# Patient Record
Sex: FEMALE | Race: WHITE | Employment: UNEMPLOYED | URBAN - METROPOLITAN AREA
[De-identification: names, ages, dates, MRNs, and addresses within clinical notes are randomized per-mention and may not be internally consistent; named-entity substitution may affect disease eponyms.]

---

## 2018-05-08 ENCOUNTER — OFFICE VISIT (OUTPATIENT)
Dept: FAMILY MEDICINE CLINIC | Facility: CLINIC | Age: 48
End: 2018-05-08

## 2018-05-08 VITALS
SYSTOLIC BLOOD PRESSURE: 130 MMHG | TEMPERATURE: 98 F | HEIGHT: 69 IN | DIASTOLIC BLOOD PRESSURE: 90 MMHG | HEART RATE: 80 BPM | RESPIRATION RATE: 16 BRPM | WEIGHT: 225 LBS | BODY MASS INDEX: 33.33 KG/M2

## 2018-05-08 DIAGNOSIS — M47.816 OSTEOARTHRITIS OF LUMBAR SPINE, UNSPECIFIED SPINAL OSTEOARTHRITIS COMPLICATION STATUS: ICD-10-CM

## 2018-05-08 DIAGNOSIS — M54.2 NECK PAIN: Primary | ICD-10-CM
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
## 2018-05-08 PROCEDURE — 99203 OFFICE O/P NEW LOW 30 MIN: CPT | Performed by: FAMILY MEDICINE

## 2018-05-08 RX ORDER — IBUPROFEN 200 MG
600 TABLET ORAL 2 TIMES DAILY
Status: ON HOLD | COMMUNITY
End: 2020-05-27

## 2018-05-08 RX ORDER — MULTIVITAMIN
1 TABLET ORAL DAILY
COMMUNITY
End: 2020-12-22

## 2018-05-08 RX ORDER — HYDROCODONE BITARTRATE AND ACETAMINOPHEN 10; 325 MG/1; MG/1
1-2 TABLET ORAL EVERY 4 HOURS PRN
Qty: 90 TABLET | Refills: 0 | Status: SHIPPED | OUTPATIENT
Start: 2018-05-08 | End: 2018-05-29

## 2018-05-08 RX ORDER — CYCLOBENZAPRINE HCL 10 MG
5 TABLET ORAL NIGHTLY PRN
COMMUNITY
Start: 2018-04-27 | End: 2019-09-13

## 2018-05-08 RX ORDER — EPINEPHRINE 0.3 MG/.3ML
0.3 INJECTION SUBCUTANEOUS ONCE
COMMUNITY

## 2018-05-08 RX ORDER — HYDROCODONE BITARTRATE AND ACETAMINOPHEN 10; 325 MG/1; MG/1
1-2 TABLET ORAL EVERY 4 HOURS PRN
COMMUNITY
End: 2018-05-08

## 2018-05-08 RX ORDER — CAFFEINE 200 MG
200 TABLET ORAL DAILY
COMMUNITY

## 2018-05-08 NOTE — H&P
[address] [place] [address]    History and Physical    [facility] Patient Status:  No patient class for patient encounter    1970 MRN [id number]   Location [address] [place] [address], [address] [facility] , [facility] Attending No att. Mother      copd     Social History:  Smoking status: Never Smoker                                                              Smokeless tobacco: Never Used                      Alcohol use:  Yes              Comment: ocassionally     Allergies/Medications person, place, and time. No cranial nerve deficit, sensory deficit or motor deficit. Skin: Skin is warm. Psychiatric: She has a normal mood and affect.  Her behavior is normal. Thought content normal.         Results:   No results found for: WBC, HGB, H

## 2018-05-08 NOTE — PATIENT INSTRUCTIONS
Thank you for choosing Gale Fisher MD at [place] [address]  To Do: Maria Del Carmen Johnson  1. Please take meds as directed  [employer] is located in [address]. Monday, Tuesday & Friday – 8 a.m. to 4 p.m. Wednesday, Thursday – 7 a.m. to 3 p.m. those potential risks and we strive to make you healthier and to improve your quality of life.     Referrals, and Radiology Information:    If your insurance requires a referral to a specialist, please allow 5 business days to process your referral request.

## 2018-05-29 ENCOUNTER — OFFICE VISIT (OUTPATIENT)
Dept: FAMILY MEDICINE CLINIC | Facility: CLINIC | Age: 48
End: 2018-05-29

## 2018-05-29 VITALS
TEMPERATURE: 98 F | WEIGHT: 216 LBS | HEART RATE: 106 BPM | SYSTOLIC BLOOD PRESSURE: 130 MMHG | RESPIRATION RATE: 16 BRPM | DIASTOLIC BLOOD PRESSURE: 76 MMHG | HEIGHT: 69 IN | BODY MASS INDEX: 31.99 KG/M2

## 2018-05-29 DIAGNOSIS — R45.86 MOOD DISTURBANCE: ICD-10-CM

## 2018-05-29 DIAGNOSIS — G89.29 OTHER CHRONIC PAIN: ICD-10-CM

## 2018-05-29 DIAGNOSIS — M47.816 OSTEOARTHRITIS OF LUMBAR SPINE, UNSPECIFIED SPINAL OSTEOARTHRITIS COMPLICATION STATUS: Primary | ICD-10-CM

## 2018-05-29 PROCEDURE — 99214 OFFICE O/P EST MOD 30 MIN: CPT | Performed by: FAMILY MEDICINE

## 2018-05-29 RX ORDER — HYDROCODONE BITARTRATE AND ACETAMINOPHEN 10; 325 MG/1; MG/1
1-2 TABLET ORAL EVERY 8 HOURS PRN
Qty: 90 TABLET | Refills: 0 | Status: SHIPPED | OUTPATIENT
Start: 2018-05-29 | End: 2019-03-27

## 2018-05-29 NOTE — PROGRESS NOTES
HPI:    Patient ID: Elana Pina is a 56year old female. HPI  Ms. Lisa York is a 81-year-old female with history of chronic pain secondary to lumbar degenerative joint disease and osteoarthritis of the lumbar spine.   Recently she was involved in a caffeine 200 MG Oral Tab Take 200 mg by mouth every 4 (four) hours as needed. Disp:  Rfl:    EPINEPHrine (EPIPEN 2-HERRERA) 0.3 MG/0.3ML Injection Solution Auto-injector Inject 0.3 mL as directed one time.  Disp:  Rfl:      Allergies:  Latex where she lives as all of these events had happened in that vicinity.   3.  Medication monitoring–she had written a pain contract today    I shall try and review what records we have available I certainly advise her to get a  as Even for me, this is g

## 2018-05-29 NOTE — PATIENT INSTRUCTIONS
Thank you for choosing Elaina Richmond MD at [place] [address]  To Do: Cornelius Flores  1. Please take meds as directed  [employer] is located in [address] Monday, Tuesday & Friday – 8 a.m. to 4 p.m. Wednesday, Thursday – 7 a.m. to 3 p.m. those potential risks and we strive to make you healthier and to improve your quality of life.     Referrals, and Radiology Information:    If your insurance requires a referral to a specialist, please allow 5 business days to process your referral request.

## 2018-10-18 ENCOUNTER — TELEPHONE (OUTPATIENT)
Dept: FAMILY MEDICINE CLINIC | Facility: CLINIC | Age: 48
End: 2018-10-18

## 2019-03-12 ENCOUNTER — TELEPHONE (OUTPATIENT)
Dept: FAMILY MEDICINE CLINIC | Facility: CLINIC | Age: 49
End: 2019-03-12

## 2019-03-27 ENCOUNTER — OFFICE VISIT (OUTPATIENT)
Dept: FAMILY MEDICINE CLINIC | Facility: CLINIC | Age: 49
End: 2019-03-27
Payer: COMMERCIAL

## 2019-03-27 ENCOUNTER — TELEPHONE (OUTPATIENT)
Dept: FAMILY MEDICINE CLINIC | Facility: CLINIC | Age: 49
End: 2019-03-27

## 2019-03-27 VITALS
RESPIRATION RATE: 16 BRPM | DIASTOLIC BLOOD PRESSURE: 80 MMHG | SYSTOLIC BLOOD PRESSURE: 120 MMHG | HEART RATE: 80 BPM | BODY MASS INDEX: 30.51 KG/M2 | TEMPERATURE: 98 F | HEIGHT: 69 IN | WEIGHT: 206 LBS

## 2019-03-27 DIAGNOSIS — G89.29 OTHER CHRONIC PAIN: ICD-10-CM

## 2019-03-27 DIAGNOSIS — M47.816 OSTEOARTHRITIS OF LUMBAR SPINE, UNSPECIFIED SPINAL OSTEOARTHRITIS COMPLICATION STATUS: Primary | ICD-10-CM

## 2019-03-27 DIAGNOSIS — M25.50 ARTHRALGIA, UNSPECIFIED JOINT: ICD-10-CM

## 2019-03-27 PROCEDURE — 99214 OFFICE O/P EST MOD 30 MIN: CPT | Performed by: FAMILY MEDICINE

## 2019-03-27 RX ORDER — CELECOXIB 100 MG/1
100 CAPSULE ORAL 2 TIMES DAILY
COMMUNITY
Start: 2019-01-09 | End: 2019-06-11

## 2019-03-27 RX ORDER — TRAMADOL HYDROCHLORIDE 50 MG/1
50 TABLET ORAL EVERY 8 HOURS PRN
COMMUNITY
Start: 2017-06-28 | End: 2019-03-27

## 2019-03-27 RX ORDER — TRAZODONE HYDROCHLORIDE 50 MG/1
1 TABLET ORAL NIGHTLY PRN
COMMUNITY
Start: 2019-01-24 | End: 2019-09-13

## 2019-03-27 RX ORDER — TRAMADOL HYDROCHLORIDE 50 MG/1
50 TABLET ORAL EVERY 8 HOURS PRN
Qty: 90 TABLET | Refills: 2 | Status: SHIPPED | OUTPATIENT
Start: 2019-03-27 | End: 2019-06-11

## 2019-03-27 NOTE — PATIENT INSTRUCTIONS
Thank you for choosing Cyn Damico MD at [place] [address]  To Do: Estella Hunt  1. Please take meds as directed. [facility] is located in [address]. Monday, Tuesday & Friday – 8 a.m. to 4 p.m.   Wednesday, Thursday – 7 a.m. to 3 p outweigh those potential risks and we strive to make you healthier and to improve your quality of life.     Referrals, and Radiology Information:    If your insurance requires a referral to a specialist, please allow 5 business days to process your referral

## 2019-03-27 NOTE — PROGRESS NOTES
HPI:    Patient ID: Declan Haywood is a 53year old female. HPI  Ms. Dejon Oscar is a pleasant 52-year-old female well known to me from my previous practice.   She has a history of lumbar degenerative joint disease and osteoarthritis of the lumbar spine Genitourinary: Negative for difficulty urinating. Neurological: Negative for dizziness. Current Outpatient Medications:  celecoxib 100 MG Oral Cap Take 100 mg by mouth 2 (two) times daily.  Disp:  Rfl:    traMADol HCl 50 MG Oral Tab Take 1 ta has a normal mood and affect. Her behavior is normal. Judgment and thought content normal.   Vitals reviewed.              ASSESSMENT/PLAN:   Osteoarthritis of lumbar spine, unspecified spinal osteoarthritis complication status  (primary encounter diagnosis

## 2019-03-27 NOTE — TELEPHONE ENCOUNTER
I faxed a medical records request to Dr Claudia Gong at [facility] fax# [phone] and ph# [phone] - fax aubreermtn rec'd - copy to scan

## 2019-04-24 ENCOUNTER — TELEPHONE (OUTPATIENT)
Dept: FAMILY MEDICINE CLINIC | Facility: CLINIC | Age: 49
End: 2019-04-24

## 2019-04-24 NOTE — TELEPHONE ENCOUNTER
PA has been submitted through [facility] for tramadol and was approved through insurance.    As long as you remain covered by your prescription drug plan and there are no  changes to your plan benefits, this request is approved for the following time period:  03/2

## 2019-06-11 ENCOUNTER — OFFICE VISIT (OUTPATIENT)
Dept: FAMILY MEDICINE CLINIC | Facility: CLINIC | Age: 49
End: 2019-06-11
Payer: COMMERCIAL

## 2019-06-11 VITALS
RESPIRATION RATE: 16 BRPM | BODY MASS INDEX: 32.29 KG/M2 | HEIGHT: 69 IN | WEIGHT: 218 LBS | SYSTOLIC BLOOD PRESSURE: 120 MMHG | DIASTOLIC BLOOD PRESSURE: 80 MMHG | HEART RATE: 80 BPM | TEMPERATURE: 98 F

## 2019-06-11 DIAGNOSIS — M47.816 OSTEOARTHRITIS OF LUMBAR SPINE, UNSPECIFIED SPINAL OSTEOARTHRITIS COMPLICATION STATUS: Primary | ICD-10-CM

## 2019-06-11 PROCEDURE — 99213 OFFICE O/P EST LOW 20 MIN: CPT | Performed by: FAMILY MEDICINE

## 2019-06-11 RX ORDER — TRAMADOL HYDROCHLORIDE 50 MG/1
50 TABLET ORAL EVERY 6 HOURS PRN
Qty: 120 TABLET | Refills: 2 | Status: SHIPPED | OUTPATIENT
Start: 2019-06-11 | End: 2019-09-02

## 2019-06-11 NOTE — PROGRESS NOTES
HPI:    Patient ID: Marvin Dhillon is a 53year old female. HPI  Ms. Alexis Nickerson is a pleasant 81-year-old female well-known to me.   She has history of chronic lumbar pain secondary to osteoarthritis of the lumbar spine which has been documented by MRI COMMENTS)    Comment:Pt  said she has an adverse reaction to             Ativan last time she had it, no sure of reaction  Penicillin G            RASH   PHYSICAL EXAM:   Physical Exam   HENT:   Mouth/Throat: Oropharynx is clear and moist. No oropha

## 2019-06-11 NOTE — PATIENT INSTRUCTIONS
Thank you for choosing Naga Hawkins MD at [place] [address]  To Do: Lori Ferrari  1. Please take meds as directed. [facility] is located in [address]. Monday, Tuesday & Friday – 8 a.m. to 4 p.m.   Wednesday, Thursday – 7 a.m. to 3 p outweigh those potential risks and we strive to make you healthier and to improve your quality of life.     Referrals, and Radiology Information:    If your insurance requires a referral to a specialist, please allow 5 business days to process your referral

## 2019-09-03 RX ORDER — TRAMADOL HYDROCHLORIDE 50 MG/1
TABLET ORAL
Qty: 120 TABLET | Refills: 2 | Status: SHIPPED | OUTPATIENT
Start: 2019-09-03 | End: 2019-11-26

## 2019-09-03 NOTE — TELEPHONE ENCOUNTER
Faxed Rx for Tramadol 50mg approved by Dr. Elma Dangelo 9/3/19 to [facility] [phone]. Fax confirmation received.

## 2019-09-03 NOTE — TELEPHONE ENCOUNTER
Requesting TRAMADOL HCL 50 MG   LOV: 6/11/19  RTC: 3 months  Last Relevant Labs: n/a  Filled: 6/11/19 # 120 with 2 refills    Future Appointments   Date Time Provider Miguel Nguyen   9/11/2019 11:00 AM Ray Lombard, MD [facility] [address]      Dispe

## 2019-09-13 RX ORDER — TRAZODONE HYDROCHLORIDE 50 MG/1
50 TABLET ORAL NIGHTLY PRN
Qty: 30 TABLET | Refills: 0 | Status: SHIPPED | OUTPATIENT
Start: 2019-09-13 | End: 2019-11-15

## 2019-09-13 RX ORDER — CYCLOBENZAPRINE HCL 10 MG
5 TABLET ORAL NIGHTLY PRN
Qty: 30 TABLET | Refills: 0 | Status: SHIPPED | OUTPATIENT
Start: 2019-09-13 | End: 2019-09-24

## 2019-09-13 NOTE — TELEPHONE ENCOUNTER
[facility]. Not filled at this pharmacy. Pharmacist thinks pt was using [facility]. Called pharmacy, pt was not using pharmacy recently, last RX 7/2019 Flexeril,  Last fill 4/10/19 #60 Trazodone. Both prescribed by another PCP.      I

## 2019-09-13 NOTE — TELEPHONE ENCOUNTER
Flexeril & Trazodone    Pt state the pharmacy sent requests yesterday and that she is completely out of the above medications.       [facility], [address], [phone]

## 2019-09-19 ENCOUNTER — TELEPHONE (OUTPATIENT)
Dept: FAMILY MEDICINE CLINIC | Facility: CLINIC | Age: 49
End: 2019-09-19

## 2019-09-19 NOTE — TELEPHONE ENCOUNTER
[address], [facility]note 2] [address]  P [phone]  F [phone]  Order faxed, confirmation received

## 2019-09-19 NOTE — TELEPHONE ENCOUNTER
Pt needs a referral sent to [address] [place] [address] at [phone],LEONIE advise

## 2019-09-24 ENCOUNTER — OFFICE VISIT (OUTPATIENT)
Dept: FAMILY MEDICINE CLINIC | Facility: CLINIC | Age: 49
End: 2019-09-24
Payer: COMMERCIAL

## 2019-09-24 ENCOUNTER — LAB ENCOUNTER (OUTPATIENT)
Dept: LAB | Age: 49
End: 2019-09-24
Attending: FAMILY MEDICINE
Payer: COMMERCIAL

## 2019-09-24 VITALS
BODY MASS INDEX: 34.21 KG/M2 | SYSTOLIC BLOOD PRESSURE: 120 MMHG | RESPIRATION RATE: 16 BRPM | HEIGHT: 69 IN | WEIGHT: 231 LBS | TEMPERATURE: 98 F | HEART RATE: 78 BPM | DIASTOLIC BLOOD PRESSURE: 80 MMHG

## 2019-09-24 DIAGNOSIS — Z12.31 ENCOUNTER FOR SCREENING MAMMOGRAM FOR BREAST CANCER: ICD-10-CM

## 2019-09-24 DIAGNOSIS — Z00.00 LABORATORY EXAM ORDERED AS PART OF ROUTINE GENERAL MEDICAL EXAMINATION: ICD-10-CM

## 2019-09-24 DIAGNOSIS — T78.40XD ALLERGIC STATE, SUBSEQUENT ENCOUNTER: ICD-10-CM

## 2019-09-24 DIAGNOSIS — Z00.00 WELLNESS EXAMINATION: Primary | ICD-10-CM

## 2019-09-24 PROBLEM — T78.40XA ALLERGY: Status: ACTIVE | Noted: 2019-09-24

## 2019-09-24 PROBLEM — T78.3XXA ANGIOEDEMA: Status: ACTIVE | Noted: 2019-09-24

## 2019-09-24 LAB
ALBUMIN SERPL-MCNC: 3.5 G/DL (ref 3.4–5)
ALBUMIN/GLOB SERPL: 1 {RATIO} (ref 1–2)
ALP LIVER SERPL-CCNC: 79 U/L (ref 39–100)
ALT SERPL-CCNC: 20 U/L (ref 13–56)
ANION GAP SERPL CALC-SCNC: 5 MMOL/L (ref 0–18)
AST SERPL-CCNC: 22 U/L (ref 15–37)
BASOPHILS # BLD AUTO: 0.04 X10(3) UL (ref 0–0.2)
BASOPHILS NFR BLD AUTO: 0.6 %
BILIRUB SERPL-MCNC: 0.2 MG/DL (ref 0.1–2)
BUN BLD-MCNC: 16 MG/DL (ref 7–18)
BUN/CREAT SERPL: 21.1 (ref 10–20)
CALCIUM BLD-MCNC: 8.5 MG/DL (ref 8.5–10.1)
CHLORIDE SERPL-SCNC: 104 MMOL/L (ref 98–112)
CHOLEST SMN-MCNC: 194 MG/DL (ref ?–200)
CO2 SERPL-SCNC: 26 MMOL/L (ref 21–32)
CREAT BLD-MCNC: 0.76 MG/DL (ref 0.55–1.02)
DEPRECATED RDW RBC AUTO: 40.4 FL (ref 35.1–46.3)
EOSINOPHIL # BLD AUTO: 0.3 X10(3) UL (ref 0–0.7)
EOSINOPHIL NFR BLD AUTO: 4.6 %
ERYTHROCYTE [DISTWIDTH] IN BLOOD BY AUTOMATED COUNT: 12.2 % (ref 11–15)
GLOBULIN PLAS-MCNC: 3.5 G/DL (ref 2.8–4.4)
GLUCOSE BLD-MCNC: 83 MG/DL (ref 70–99)
HCT VFR BLD AUTO: 40.7 % (ref 35–48)
HDLC SERPL-MCNC: 60 MG/DL (ref 40–59)
HGB BLD-MCNC: 13.1 G/DL (ref 12–16)
IMM GRANULOCYTES # BLD AUTO: 0.04 X10(3) UL (ref 0–1)
IMM GRANULOCYTES NFR BLD: 0.6 %
LDLC SERPL CALC-MCNC: 103 MG/DL (ref ?–100)
LYMPHOCYTES # BLD AUTO: 2.15 X10(3) UL (ref 1–4)
LYMPHOCYTES NFR BLD AUTO: 32.7 %
M PROTEIN MFR SERPL ELPH: 7 G/DL (ref 6.4–8.2)
MCH RBC QN AUTO: 29.1 PG (ref 26–34)
MCHC RBC AUTO-ENTMCNC: 32.2 G/DL (ref 31–37)
MCV RBC AUTO: 90.4 FL (ref 80–100)
MONOCYTES # BLD AUTO: 0.45 X10(3) UL (ref 0.1–1)
MONOCYTES NFR BLD AUTO: 6.8 %
NEUTROPHILS # BLD AUTO: 3.6 X10 (3) UL (ref 1.5–7.7)
NEUTROPHILS # BLD AUTO: 3.6 X10(3) UL (ref 1.5–7.7)
NEUTROPHILS NFR BLD AUTO: 54.7 %
NONHDLC SERPL-MCNC: 134 MG/DL (ref ?–130)
OSMOLALITY SERPL CALC.SUM OF ELEC: 280 MOSM/KG (ref 275–295)
PLATELET # BLD AUTO: 300 10(3)UL (ref 150–450)
POTASSIUM SERPL-SCNC: 4.1 MMOL/L (ref 3.5–5.1)
RBC # BLD AUTO: 4.5 X10(6)UL (ref 3.8–5.3)
SODIUM SERPL-SCNC: 135 MMOL/L (ref 136–145)
TRIGL SERPL-MCNC: 156 MG/DL (ref 30–149)
TSI SER-ACNC: 1.94 MIU/ML (ref 0.36–3.74)
VLDLC SERPL CALC-MCNC: 31 MG/DL (ref 0–30)
WBC # BLD AUTO: 6.6 X10(3) UL (ref 4–11)

## 2019-09-24 PROCEDURE — 99396 PREV VISIT EST AGE 40-64: CPT | Performed by: FAMILY MEDICINE

## 2019-09-24 PROCEDURE — 99213 OFFICE O/P EST LOW 20 MIN: CPT | Performed by: FAMILY MEDICINE

## 2019-09-24 PROCEDURE — 84443 ASSAY THYROID STIM HORMONE: CPT

## 2019-09-24 RX ORDER — CYCLOBENZAPRINE HCL 10 MG
10 TABLET ORAL NIGHTLY PRN
Qty: 30 TABLET | Refills: 5 | Status: SHIPPED | OUTPATIENT
Start: 2019-09-24 | End: 2020-04-06

## 2019-09-24 RX ORDER — CETIRIZINE HYDROCHLORIDE 10 MG/1
TABLET ORAL DAILY
COMMUNITY

## 2019-09-24 RX ORDER — METHYLPREDNISOLONE 4 MG/1
TABLET ORAL
Qty: 1 KIT | Refills: 0 | Status: SHIPPED | OUTPATIENT
Start: 2019-09-24 | End: 2020-01-09 | Stop reason: ALTCHOICE

## 2019-09-24 NOTE — PROGRESS NOTES
Wellness Exam    REASON FOR VISIT:    Gary Gaston is a 51year old female who presents for an [address]     Current Complaints: Ms. Jessica Reynolds is a pleasant 50 y/o F here for her wellness exam  Flu shot: see immunization record  Health Opener : No    Scoring  Total Score: 0       PHQ-4: Over the LAST 2 WEEKS       Depression Screening (PHQ-2/PHQ-9): Over the LAST 2 WEEKS   Little interest or pleasure in doing things (over the last two weeks)?: Not at all    Feeling down, depressed, or ho Latex                   ANAPHYLAXIS  Oxycodone               SWELLING    Comment:Leg swelling  Phentermine             HIVES, SWELLING  Lorazepam               OTHER (SEE COMMENTS)    Comment:Pt  said she has an adverse reaction to             A pain and visual disturbance. Respiratory: Negative for cough and shortness of breath. Cardiovascular: Negative for chest pain and palpitations. Gastrointestinal: Negative for nausea, vomiting, abdominal pain and diarrhea.    Genitourinary: Negative f for an Annual Health Assessment.    Wellness examination  (primary encounter diagnosis)  Encounter for screening mammogram for breast cancer  Laboratory exam ordered as part of routine general medical examination  Allergic state, subsequent encounter    AC Medical education done. Outcome: Patient verbalizes understanding. Educated by: MD     The patient indicates understanding of these issues and agrees to the plan.     SUGGESTED VACCINATIONS - Influenza, Pneumococcal, Zoster, Tetanus     Immunization His HIVES, SWELLING  Lorazepam               OTHER (SEE COMMENTS)    Comment:Pt  said she has an adverse reaction to             Ativan last time she had it, no sure of reaction  Penicillin G            RASH   PHYSICAL EXAM:   Physical Exam           AS

## 2019-09-24 NOTE — PATIENT INSTRUCTIONS
Thank you for choosing Munir Shah MD at [place] [address]  To Do: Chilean Phenes  1. Please see age appropriate health prevention below    [facility] is located in [address]. Monday, Tuesday & Friday – 8 a.m. to 4 p.m.   Wednesday, [facility] that the benefits outweigh those potential risks and we strive to make you healthier and to improve your quality of life.     Referrals, and Radiology Information:    If your insurance requires a referral to a specialist, please allow 5 business days to pro any age who are overweight or obese and have 1 or more additional risk factors for diabetes At least every 3 years1   Type 2 diabetes or prediabetes All women diagnosed with gestational diabetes Lifelong testing every 3 years   Type 2 diabetes All women wi in this age group At routine exams   Gonorrhea Sexually active women at increased risk for infection At routine exams   Hepatitis C Anyone at increased risk; 1 time for those born between [facility] At routine exams   High cholesterol or triglycerides A doses   Pneumococcal conjugate vaccine (PCV13) and pneumococcal polysaccharide vaccine (PPSV23) Women at increased risk for infection–talk with your healthcare provider 1 or 2 doses   Tetanus/diphtheria/pertussis (Td/Tdap) booster All women in this age [de-identified]

## 2019-10-22 ENCOUNTER — TELEPHONE (OUTPATIENT)
Dept: FAMILY MEDICINE CLINIC | Facility: CLINIC | Age: 49
End: 2019-10-22

## 2019-10-22 ENCOUNTER — OFFICE VISIT (OUTPATIENT)
Dept: FAMILY MEDICINE CLINIC | Facility: CLINIC | Age: 49
End: 2019-10-22
Payer: COMMERCIAL

## 2019-10-22 VITALS
HEART RATE: 88 BPM | BODY MASS INDEX: 34.66 KG/M2 | HEIGHT: 69 IN | TEMPERATURE: 98 F | DIASTOLIC BLOOD PRESSURE: 88 MMHG | RESPIRATION RATE: 16 BRPM | WEIGHT: 234 LBS | SYSTOLIC BLOOD PRESSURE: 132 MMHG

## 2019-10-22 DIAGNOSIS — M54.50 ACUTE MIDLINE LOW BACK PAIN WITHOUT SCIATICA: ICD-10-CM

## 2019-10-22 DIAGNOSIS — M25.521 RIGHT ELBOW PAIN: Primary | ICD-10-CM

## 2019-10-22 PROCEDURE — 99214 OFFICE O/P EST MOD 30 MIN: CPT | Performed by: FAMILY MEDICINE

## 2019-10-22 RX ORDER — HYDROCODONE BITARTRATE AND ACETAMINOPHEN 10; 325 MG/1; MG/1
1-2 TABLET ORAL EVERY 8 HOURS PRN
Qty: 90 TABLET | Refills: 0 | Status: SHIPPED | OUTPATIENT
Start: 2019-10-22 | End: 2020-01-09

## 2019-10-22 NOTE — PROGRESS NOTES
HPI:    Patient ID: Prasanna Griffiths is a 51year old female. HPI  Ms. Dante Muro is a pleasant 56-year-old female with history of chronic pain secondary to degenerative changes on her lumbar spine but was in her home last Thursday last week when she wa Take 1 tablet (50 mg total) by mouth nightly as needed. , Disp: 30 tablet, Rfl: 0  TRAMADOL HCL 50 MG Oral Tab, TAKE ONE TABLET BY MOUTH EVERY 6 HOURS AS NEEDED, Disp: 120 tablet, Rfl: 2  Multiple Vitamin (MULTI-VITAMIN DAILY) Oral Tab, Take 1 tablet by stewart wait for that and await results and asked her to follow-up as scheduled or as needed  No orders of the defined types were placed in this encounter.       Meds This Visit:  Requested Prescriptions     Signed Prescriptions Disp Refills   • HYDROcodone-acetami

## 2019-10-22 NOTE — PATIENT INSTRUCTIONS
Thank you for choosing Ching Lama MD at [place] [address]  To Do: Jenn Cesar  1. Please take meds as directed. [facility] is located in [address]. Monday, Tuesday & Friday – 8 a.m. to 4 p.m.   Wednesday, Thursday – 7 a.m. to 3 p outweigh those potential risks and we strive to make you healthier and to improve your quality of life.     Referrals, and Radiology Information:    If your insurance requires a referral to a specialist, please allow 5 business days to process your referral

## 2019-10-22 NOTE — TELEPHONE ENCOUNTER
[address] states they need a Diagnosis code for   HYDROcodone-acetaminophen  MG Oral Tab 90 tablet 0 10/22/2019    Sig:   Take 1-2 tablets by mouth every 8 (eight) hours as needed for Pain.  This should last for at least 30 days     Route:

## 2019-10-28 ENCOUNTER — TELEPHONE (OUTPATIENT)
Dept: FAMILY MEDICINE CLINIC | Facility: CLINIC | Age: 49
End: 2019-10-28

## 2019-10-28 NOTE — TELEPHONE ENCOUNTER
Right Elbow Xray report dated 10-24-19 from [address] [place] [address] received. Reviewed per Dr. Nick Armstrong: Normal.    LMOM for pt requesting a return call. Sent to scan, copy in Triage file.

## 2019-10-28 NOTE — TELEPHONE ENCOUNTER
Pt returned call, informed of normal results. Pt expressed understanding.  Pt still states the elbow is bruised with swelling, inquired if she wanted an Ortho recommendation and pt states she will monitor symptoms closely and go if they persist. Task comple

## 2019-11-01 ENCOUNTER — TELEPHONE (OUTPATIENT)
Dept: FAMILY MEDICINE CLINIC | Facility: CLINIC | Age: 49
End: 2019-11-01

## 2019-11-01 NOTE — TELEPHONE ENCOUNTER
Spoke to the pharmacists and provided information below.    OV note 10/22/19  ASSESSMENT/PLAN:   Right elbow pain  (primary encounter diagnosis)  -Elevate this extremity at rest; may continue current pain regimen but will change tramadol to Norco for now; w

## 2019-11-01 NOTE — TELEPHONE ENCOUNTER
Pharmacist states the patient filled Colette Rias on 10/22 and now calling to fill Tramadol. They want to confirm that the patient is to be taking both, please advise.

## 2019-11-01 NOTE — TELEPHONE ENCOUNTER
Patient states that Dr Fitz Rios advised her to take the Derotha Salaam and when she didn't need them to go back to Tramadol.  Patient received 90 day of [place]. States she now needs a refill on her Tramadol     Tramadol 50 mg 9/3/19 #120 with 2 refills     Please adv

## 2019-11-06 ENCOUNTER — TELEPHONE (OUTPATIENT)
Dept: FAMILY MEDICINE CLINIC | Facility: CLINIC | Age: 49
End: 2019-11-06

## 2019-11-06 NOTE — TELEPHONE ENCOUNTER
Received Fax report from [facility] for MRI Lumbar spine w/o contrast with exam date 11-5-19. Reviewed per Dr. Nick Armstrong:  Essentially stable disc disease and manifestations of spondylosis, inform pt.      LMOM for pt requestin

## 2019-11-06 NOTE — TELEPHONE ENCOUNTER
Informed pt of these results and review and she expressed understanding and agreement. Task completed.

## 2019-11-18 RX ORDER — TRAZODONE HYDROCHLORIDE 50 MG/1
TABLET ORAL
Qty: 30 TABLET | Refills: 1 | Status: SHIPPED | OUTPATIENT
Start: 2019-11-18 | End: 2021-01-19

## 2019-11-18 NOTE — TELEPHONE ENCOUNTER
Requesting TRAZODONE HCL 50 MG   LOV: 10/22/19  RTC: 6 months  Last Relevant Labs: 9/24/19  Filled: 9/13/19  # 30 with 0 refills    Future Appointments   Date Time Provider Miguel Nguyen   3/24/2020 11:00 AM Dearl Simmonds, MD [facility] [address]

## 2019-11-26 RX ORDER — TRAMADOL HYDROCHLORIDE 50 MG/1
TABLET ORAL
Qty: 120 TABLET | Refills: 1 | Status: SHIPPED | OUTPATIENT
Start: 2019-11-26 | End: 2020-01-09 | Stop reason: ALTCHOICE

## 2019-11-26 NOTE — TELEPHONE ENCOUNTER
Requested Prescriptions     Pending Prescriptions Disp Refills   • TRAMADOL HCL 50 MG Oral Tab [Pharmacy Med Name: TRAMADOL HCL 50 MG TABLET] 120 tablet 2     Sig: TAKE ONE TABLET BY MOUTH EVERY 6 HOURS AS NEEDED     NON PROTOCOL MEDICATION    LOV: 10/22/2

## 2020-01-09 ENCOUNTER — OFFICE VISIT (OUTPATIENT)
Dept: FAMILY MEDICINE CLINIC | Facility: CLINIC | Age: 50
End: 2020-01-09
Payer: COMMERCIAL

## 2020-01-09 ENCOUNTER — TELEPHONE (OUTPATIENT)
Dept: FAMILY MEDICINE CLINIC | Facility: CLINIC | Age: 50
End: 2020-01-09

## 2020-01-09 VITALS
DIASTOLIC BLOOD PRESSURE: 90 MMHG | HEIGHT: 69 IN | SYSTOLIC BLOOD PRESSURE: 150 MMHG | WEIGHT: 236 LBS | RESPIRATION RATE: 16 BRPM | TEMPERATURE: 98 F | HEART RATE: 100 BPM | BODY MASS INDEX: 34.96 KG/M2

## 2020-01-09 DIAGNOSIS — M47.816 OSTEOARTHRITIS OF LUMBAR SPINE, UNSPECIFIED SPINAL OSTEOARTHRITIS COMPLICATION STATUS: ICD-10-CM

## 2020-01-09 DIAGNOSIS — N81.10 VAGINAL PROLAPSE: Primary | ICD-10-CM

## 2020-01-09 PROCEDURE — 99214 OFFICE O/P EST MOD 30 MIN: CPT | Performed by: FAMILY MEDICINE

## 2020-01-09 RX ORDER — HYDROCODONE BITARTRATE AND ACETAMINOPHEN 10; 325 MG/1; MG/1
1-2 TABLET ORAL EVERY 8 HOURS PRN
Qty: 90 TABLET | Refills: 0 | Status: SHIPPED | OUTPATIENT
Start: 2020-01-09 | End: 2020-01-22

## 2020-01-09 NOTE — PATIENT INSTRUCTIONS
Thank you for choosing Jacquelyn Pantoja MD at [place] [address]  To Do: Janice Mitchell  1. Please take meds as directed. [facility] is located in [address]. Monday, Tuesday & Friday – 8 a.m. to 4 p.m.   Wednesday, Thursday – 7 a.m. to 3 p outweigh those potential risks and we strive to make you healthier and to improve your quality of life.     Referrals, and Radiology Information:    If your insurance requires a referral to a specialist, please allow 5 business days to process your referral The vagina is the canal from the uterus to the outside of the body. H. The rectum stores stool until a bowel movement occurs.   What causes pelvic organ prolapse?   There are several causes of pelvic organ prolapse including:  · Vaginal childbirth  · Hered

## 2020-01-09 NOTE — PROGRESS NOTES
HPI:    Patient ID: Lei Hardin is a 51year old female. HPI  Ms. 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 • Cyclobenzaprine HCl 10 MG Oral Tab Take 1 tablet (10 mg total) by mouth nightly as needed.  30 tablet 5   • Mu -Continue with current pain medication regimen at this point    With regards to her case that stem in 2018. She had provided me with documents to review. To the best of my knowledge I do not think that she has had depression or bipolar disorder.   I had a

## 2020-01-09 NOTE — TELEPHONE ENCOUNTER
Patient would like a letter for her to be able to miss work testing that she is scheduled to do on Monday and Tuesday of next week. Testing involves physical fit testing. Patient was encouraged to keep her appt with OB to f/u for vaginal prolapse.  Pt will

## 2020-01-09 NOTE — TELEPHONE ENCOUNTER
Patient needs a letter/note from provider verifying her medical condition.  She mentioned specialist appointment on 1-22-20, she might not be able to make it, she lives far and she does not feel she can make commute due to pain she experiences

## 2020-01-10 NOTE — TELEPHONE ENCOUNTER
Received call back from pt, requesting that letter be faxed to [phone] Attn: Dylan Teran, [facility], [facility],  at [facility]. Pt states she will still  a copy on 1/22/2020 when she is in the building for her OB appt.

## 2020-01-10 NOTE — TELEPHONE ENCOUNTER
Attempted to reach pt, LMOM informing that letter is done and will be at the  for . Alan Sharma RN cosigned letter. Task completed.

## 2020-01-22 ENCOUNTER — TELEPHONE (OUTPATIENT)
Dept: FAMILY MEDICINE CLINIC | Facility: CLINIC | Age: 50
End: 2020-01-22

## 2020-01-22 ENCOUNTER — OFFICE VISIT (OUTPATIENT)
Dept: OBGYN CLINIC | Facility: CLINIC | Age: 50
End: 2020-01-22
Payer: COMMERCIAL

## 2020-01-22 VITALS
DIASTOLIC BLOOD PRESSURE: 90 MMHG | SYSTOLIC BLOOD PRESSURE: 160 MMHG | HEART RATE: 97 BPM | HEIGHT: 69 IN | BODY MASS INDEX: 36.41 KG/M2 | WEIGHT: 245.81 LBS

## 2020-01-22 DIAGNOSIS — N81.83 WEAKENING OF RECTOVAGINAL TISSUE: Primary | ICD-10-CM

## 2020-01-22 PROCEDURE — 99203 OFFICE O/P NEW LOW 30 MIN: CPT | Performed by: OBSTETRICS & GYNECOLOGY

## 2020-01-22 RX ORDER — TRAMADOL HYDROCHLORIDE 50 MG/1
50 TABLET ORAL EVERY 6 HOURS PRN
COMMUNITY
End: 2020-02-19

## 2020-01-22 RX ORDER — HYDROCODONE BITARTRATE AND ACETAMINOPHEN 10; 325 MG/1; MG/1
1-2 TABLET ORAL EVERY 8 HOURS PRN
Qty: 90 TABLET | Refills: 0 | Status: SHIPPED | OUTPATIENT
Start: 2020-01-22 | End: 2020-04-09

## 2020-01-22 NOTE — PROGRESS NOTES
OB/GYN H&P     1/22/2020  3:04 PM    CC: Patient presents with:  New Patient: Vaginal prolapse. Pt c/o back, abd, and groin area since 1/8/20.  Pt states that she has a pulling feeling in her left groin area       HPI: Sulema Moran is a 51year old G to             Ativan last time she had it, no sure of reaction  Penicillin G            RASH  traMADol HCl 50 MG Oral Tab, Take 50 mg by mouth every 6 (six) hours as needed for Pain., Disp: , Rfl:   diphenhydrAMINE HCl (BENADRYL ALLERGY OR), Take by mouth Genitourinary:    Genitourinary Comments: Normal external genitalia. Cervix appeared normal on speculum exam.  grade I rectocele. Negative cough stress test.       Neurological: She is alert and oriented to person, place, and time.    Skin: Skin is warm

## 2020-01-22 NOTE — TELEPHONE ENCOUNTER
Pt is calling to let our office know that she schedule her appt with Dr. Parra Poster for 2/19/2020 @ 1:00p. For further information, please call pt at [phone].

## 2020-01-22 NOTE — TELEPHONE ENCOUNTER
- Pt is in the office and requesting a refill for rx. HYDROcodone-acetaminophen  MG Oral Tab 90 tablet 0 1/9/2020    Sig:   Take 1-2 tablets by mouth every 8 (eight) hours as needed for Pain.  This should last for at least 30 days     R

## 2020-01-31 RX ORDER — TRAMADOL HYDROCHLORIDE 50 MG/1
50 TABLET ORAL EVERY 6 HOURS PRN
Qty: 120 TABLET | Refills: 0 | Status: SHIPPED | OUTPATIENT
Start: 2020-01-31 | End: 2020-02-26

## 2020-01-31 NOTE — TELEPHONE ENCOUNTER
Requesting Tramadol 50mg  LOV: 1/9/2020  RTC: 3 months  Last Relevant Labs: annual labs done 9/24/19  Filled: 11/26/19 #120 with 1 refills    Future Appointments   Date Time Provider Miguel Nguyen   2/19/2020  1:00 PM Colin Mckee MD [facility] Larisa Polanco

## 2020-01-31 NOTE — TELEPHONE ENCOUNTER
Patient calling to request Tramadol refill instead of Norco. She is unable to  script for Norco at this time, will like Tramadol sent to pharmacy. Patient stated she is due next week for refill.

## 2020-02-03 NOTE — TELEPHONE ENCOUNTER
Faxed Rx for Tramadol 50mg approved by Dr. Soto Ahr 1/31/2020 to [facility] [phone]. Fax confirmation received.

## 2020-02-19 ENCOUNTER — OFFICE VISIT (OUTPATIENT)
Dept: UROLOGY | Facility: HOSPITAL | Age: 50
End: 2020-02-19
Attending: OBSTETRICS & GYNECOLOGY
Payer: COMMERCIAL

## 2020-02-19 VITALS
WEIGHT: 245 LBS | DIASTOLIC BLOOD PRESSURE: 90 MMHG | BODY MASS INDEX: 36.29 KG/M2 | HEIGHT: 69 IN | SYSTOLIC BLOOD PRESSURE: 144 MMHG

## 2020-02-19 DIAGNOSIS — R10.2 VAGINAL PAIN: ICD-10-CM

## 2020-02-19 DIAGNOSIS — R10.2 PELVIC PAIN: ICD-10-CM

## 2020-02-19 DIAGNOSIS — N81.11 CYSTOCELE, MIDLINE: Primary | ICD-10-CM

## 2020-02-19 DIAGNOSIS — N81.6 RECTOCELE: ICD-10-CM

## 2020-02-19 PROCEDURE — 99212 OFFICE O/P EST SF 10 MIN: CPT

## 2020-02-19 RX ORDER — HYDROCODONE BITARTRATE AND ACETAMINOPHEN 10; 325 MG/1; MG/1
1-2 TABLET ORAL EVERY 8 HOURS PRN
Qty: 90 TABLET | Refills: 0 | Status: SHIPPED | OUTPATIENT
Start: 2020-04-21 | End: 2020-03-10

## 2020-02-19 RX ORDER — HYDROCODONE BITARTRATE AND ACETAMINOPHEN 10; 325 MG/1; MG/1
1-2 TABLET ORAL EVERY 8 HOURS PRN
Qty: 90 TABLET | Refills: 0 | Status: SHIPPED | OUTPATIENT
Start: 2020-02-19 | End: 2020-03-20

## 2020-02-19 RX ORDER — HYDROCODONE BITARTRATE AND ACETAMINOPHEN 10; 325 MG/1; MG/1
1-2 TABLET ORAL EVERY 8 HOURS PRN
Qty: 90 TABLET | Refills: 0 | Status: SHIPPED | OUTPATIENT
Start: 2020-03-21 | End: 2020-03-10

## 2020-02-19 RX ORDER — DOCUSATE SODIUM 100 MG/1
100 CAPSULE, LIQUID FILLED ORAL 2 TIMES DAILY
COMMUNITY
End: 2020-05-26

## 2020-02-19 NOTE — PROGRESS NOTES
ID: Edith Jara  : 1970  Date: 2020     Referred by Dr. Marycarmen Robison MD    Patient presents with:  Prolapse      HPI:  The patient is a 51year-old female,  (vaginal delivery), who presents for evaluation of prolapse.   She states th tablet (50 mg total) by mouth every 6 (six) hours as needed for Pain., Disp: 120 tablet, Rfl: 0  HYDROcodone-acetaminophen  MG Oral Tab, Take 1-2 tablets by mouth every 8 (eight) hours as needed for Pain.  This should last for at least 30 days, Disp: bilaterally. HEART:  Regular rate and rhythm, without murmurs. ABDOMEN: Non tender to palpation, tone normal without rigidity or guarding, no masses present, no evidence of hernia. EXTREMITIES:  Without edema, varicosities or lesions.    SKIN:  Warm and with A&P repair. She has good apical support. She agrees to proceed with preoperative urodynamic tesing. Pt verbalizes understanding of all above discussed information    Follow up after testing.  information provided.      Lonny Wood MD

## 2020-02-19 NOTE — TELEPHONE ENCOUNTER
- Pt is in the area and would like to  rx today if possible? HYDROcodone-acetaminophen  MG Oral Tab 90 tablet 0 1/22/2020    Sig:   Take 1-2 tablets by mouth every 8 (eight) hours as needed for Pain.  This should last for at [place] [address]

## 2020-02-24 ENCOUNTER — OFFICE VISIT (OUTPATIENT)
Dept: UROLOGY | Facility: HOSPITAL | Age: 50
End: 2020-02-24
Attending: OBSTETRICS & GYNECOLOGY
Payer: COMMERCIAL

## 2020-02-24 VITALS — RESPIRATION RATE: 20 BRPM | WEIGHT: 245 LBS | HEIGHT: 69 IN | BODY MASS INDEX: 36.29 KG/M2

## 2020-02-24 DIAGNOSIS — N81.11 CYSTOCELE, MIDLINE: Primary | ICD-10-CM

## 2020-02-24 DIAGNOSIS — N81.6 RECTOCELE: ICD-10-CM

## 2020-02-24 DIAGNOSIS — N39.3 FEMALE STRESS INCONTINENCE: ICD-10-CM

## 2020-02-24 LAB
BLOOD URINE: NEGATIVE
CONTROL RUN WITHIN 24 HOURS?: YES
LEUKOCYTE ESTERASE URINE: NEGATIVE
NITRITE URINE: NEGATIVE

## 2020-02-24 PROCEDURE — 51797 INTRAABDOMINAL PRESSURE TEST: CPT

## 2020-02-24 PROCEDURE — 51729 CYSTOMETROGRAM W/VP&UP: CPT

## 2020-02-24 PROCEDURE — 51784 ANAL/URINARY MUSCLE STUDY: CPT

## 2020-02-24 PROCEDURE — 51741 ELECTRO-UROFLOWMETRY FIRST: CPT

## 2020-02-24 NOTE — PATIENT INSTRUCTIONS
[address]  [place] [address]  [facility]: [phone]  FAX: [phone]       Urodynamic Testing Discharge Instructions: There are NO dietary or activity restrictions.   You may resume

## 2020-02-24 NOTE — PROCEDURES
Patient here for urodynamic testing. Procedure explained and confirmed by patient. See evaluation form for results. Both verbal and written discharge instructions were given.   Patient tolerated procedure well and will follow up with Dr. Arabella Sherman [x]  Sit  []  Stand  []  Supine  First sensation:   16 mL  First desire to void:   53 mL  Strong desire to void:  192 mL  Maximum cystometric capacity:   261 mL  Detrusor Activity:  []  Unstable   [x]  Stable  Urge leakage?     []  Yes [x]  No  Volume at 1

## 2020-02-26 RX ORDER — TRAMADOL HYDROCHLORIDE 50 MG/1
TABLET ORAL
Qty: 120 TABLET | Refills: 0 | Status: SHIPPED | OUTPATIENT
Start: 2020-02-26 | End: 2020-03-10

## 2020-02-26 NOTE — TELEPHONE ENCOUNTER
Requesting Tramadol 50mg  LOV: 1/9/2020  RTC: 3 months  Last Relevant Labs: annual labs done 9/24/19  Filled: 1/31/2020 #120 with 0 refills    Future Appointments   Date Time Provider [facility]   3/4/2020  1:40 PM MD Oanh Grimm [address]

## 2020-03-04 ENCOUNTER — TELEPHONE (OUTPATIENT)
Dept: FAMILY MEDICINE CLINIC | Facility: CLINIC | Age: 50
End: 2020-03-04

## 2020-03-04 ENCOUNTER — OFFICE VISIT (OUTPATIENT)
Dept: UROLOGY | Facility: HOSPITAL | Age: 50
End: 2020-03-04
Attending: OBSTETRICS & GYNECOLOGY
Payer: COMMERCIAL

## 2020-03-04 VITALS
DIASTOLIC BLOOD PRESSURE: 91 MMHG | WEIGHT: 245 LBS | HEIGHT: 69 IN | BODY MASS INDEX: 36.29 KG/M2 | SYSTOLIC BLOOD PRESSURE: 147 MMHG

## 2020-03-04 DIAGNOSIS — N81.11 CYSTOCELE, MIDLINE: Primary | ICD-10-CM

## 2020-03-04 DIAGNOSIS — N81.6 RECTOCELE: ICD-10-CM

## 2020-03-04 DIAGNOSIS — N39.3 FEMALE STRESS INCONTINENCE: ICD-10-CM

## 2020-03-04 PROCEDURE — 99212 OFFICE O/P EST SF 10 MIN: CPT

## 2020-03-04 NOTE — PROGRESS NOTES
Jen Shelby  1970  3/4/2020     CC: Follow up Urodynamic testing     HPI:  The patient is a 56 year-old female,  (vaginal delivery), who was last seen in my office on 2020. Please refer to my previous office note for full details.   To recommendation to proceed with incontinence procedure with sling at the time of anterior and posterior colporrhaphy.   The patient wishes to proceed with A&P repair, sling, cystoscopy.      Thorough discussion of surgical risks, benefits, and alternatives i

## 2020-03-04 NOTE — TELEPHONE ENCOUNTER
DAVID for Kun letting her know pt contacted [facility] to set her 3/24/2020 appt up for her pre-op. I asked for her to fax us any medical clearance request they will need from PCP. Phone number and fax number also given.

## 2020-03-04 NOTE — TELEPHONE ENCOUNTER
Future Appointments   Date Time Provider Miguel Wendy   3/24/2020 11:00 AM Geoff Carbajal MD [facility] [address]   5/27/2020  2:00 PM Demetris Randall MD [address]     Pt called wanting to do a pre-op for her 4/14/2020 surgery date with

## 2020-03-10 ENCOUNTER — OFFICE VISIT (OUTPATIENT)
Dept: FAMILY MEDICINE CLINIC | Facility: CLINIC | Age: 50
End: 2020-03-10
Payer: COMMERCIAL

## 2020-03-10 ENCOUNTER — TELEPHONE (OUTPATIENT)
Dept: FAMILY MEDICINE CLINIC | Facility: CLINIC | Age: 50
End: 2020-03-10

## 2020-03-10 VITALS
DIASTOLIC BLOOD PRESSURE: 94 MMHG | OXYGEN SATURATION: 97 % | RESPIRATION RATE: 16 BRPM | HEART RATE: 108 BPM | WEIGHT: 245 LBS | SYSTOLIC BLOOD PRESSURE: 140 MMHG | HEIGHT: 69 IN | BODY MASS INDEX: 36.29 KG/M2 | TEMPERATURE: 98 F

## 2020-03-10 DIAGNOSIS — R03.0 ELEVATED BLOOD PRESSURE READING: ICD-10-CM

## 2020-03-10 DIAGNOSIS — R05.9 COUGH: ICD-10-CM

## 2020-03-10 DIAGNOSIS — J22 LRTI (LOWER RESPIRATORY TRACT INFECTION): Primary | ICD-10-CM

## 2020-03-10 PROCEDURE — 99214 OFFICE O/P EST MOD 30 MIN: CPT | Performed by: FAMILY MEDICINE

## 2020-03-10 RX ORDER — AZITHROMYCIN 250 MG/1
TABLET, FILM COATED ORAL
Qty: 6 TABLET | Refills: 0 | Status: SHIPPED | OUTPATIENT
Start: 2020-03-10 | End: 2020-03-20

## 2020-03-10 RX ORDER — PROMETHAZINE HYDROCHLORIDE AND CODEINE PHOSPHATE 6.25; 1 MG/5ML; MG/5ML
2.5 SYRUP ORAL EVERY 4 HOURS PRN
Qty: 118 ML | Refills: 0 | Status: SHIPPED | OUTPATIENT
Start: 2020-03-10 | End: 2020-04-09 | Stop reason: ALTCHOICE

## 2020-03-10 RX ORDER — TRAMADOL HYDROCHLORIDE 50 MG/1
TABLET ORAL
Qty: 120 TABLET | Refills: 0 | Status: SHIPPED | OUTPATIENT
Start: 2020-03-10 | End: 2020-03-20

## 2020-03-10 NOTE — TELEPHONE ENCOUNTER
Calling to see if they can substitute the Pro-Air respiclick with regular Albuterol. Her insurance doesn't cover the ProAir.

## 2020-03-10 NOTE — PROGRESS NOTES
HPI:    Patient ID: Elijah Avendano is a 51year old female. HPI  Ms. 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 90 tablet 0   • diphenhydrAMINE HCl (BENADRYL ALLERGY OR) Take by mouth. • HYDROcodone-acetaminophen  MG Oral Tab Take 1-2 tablets by mouth every 8 (eight) hours as needed for Pain.  This should last for at least 30 days 90 tab mass. There is no tenderness. Vitals reviewed.              ASSESSMENT/PLAN:   Lrti (lower respiratory tract infection)  (primary encounter diagnosis)  -We will go and treat with Z-Antonio; I will prescribe her with albuterol to be taken as needed for shortne

## 2020-03-10 NOTE — PATIENT INSTRUCTIONS
Thank you for choosing Jennifer MD Dmitriy at [place] [address]  To Do: Leopoldo Hargrove  1. Please take meds as directed. [facility] is located in [address]. Monday, Tuesday & Friday – 8 a.m. to 4 p.m.   Wednesday, Thursday – 7 a.m. to 3 outweigh those potential risks and we strive to make you healthier and to improve your quality of life.     Referrals, and Radiology Information:    If your insurance requires a referral to a specialist, please allow 5 business days to process your referral

## 2020-03-11 ENCOUNTER — TELEPHONE (OUTPATIENT)
Dept: FAMILY MEDICINE CLINIC | Facility: CLINIC | Age: 50
End: 2020-03-11

## 2020-03-11 RX ORDER — ALBUTEROL SULFATE 90 UG/1
2 AEROSOL, METERED RESPIRATORY (INHALATION) EVERY 6 HOURS PRN
Qty: 1 INHALER | Refills: 0 | Status: SHIPPED | OUTPATIENT
Start: 2020-03-11 | End: 2020-04-02

## 2020-03-11 NOTE — TELEPHONE ENCOUNTER
Pharmacy looking for alternative inhaler. Inhaler that was sent was Proair Respiclick (inhaleration aerosol powder). Insurance will cover:  Albuterol sulfate Inhalation Aerosol Solution or Ventolin. Please advise. Rx pended.

## 2020-03-11 NOTE — TELEPHONE ENCOUNTER
Insurance will not cover the Albuterol requested, they will cover Albuterol Sulfate HFA in 18 g, 8.5 or Ventolin HFA, please advise,

## 2020-03-17 RX ORDER — HYDROCODONE BITARTRATE AND ACETAMINOPHEN 10; 325 MG/1; MG/1
1-2 TABLET ORAL EVERY 8 HOURS PRN
Qty: 90 TABLET | Refills: 0 | Status: SHIPPED | OUTPATIENT
Start: 2020-05-18 | End: 2020-04-09

## 2020-03-17 RX ORDER — HYDROCODONE BITARTRATE AND ACETAMINOPHEN 10; 325 MG/1; MG/1
1-2 TABLET ORAL EVERY 8 HOURS PRN
Qty: 90 TABLET | Refills: 0 | Status: SHIPPED | OUTPATIENT
Start: 2020-04-17 | End: 2020-04-09

## 2020-03-17 RX ORDER — HYDROCODONE BITARTRATE AND ACETAMINOPHEN 10; 325 MG/1; MG/1
1-2 TABLET ORAL EVERY 8 HOURS PRN
Qty: 90 TABLET | Refills: 0 | Status: SHIPPED | OUTPATIENT
Start: 2020-03-17 | End: 2020-04-16

## 2020-03-17 NOTE — TELEPHONE ENCOUNTER
Patient calling, needs [place] refill. Has run out of medication, needs doctor to give order to have medication filled early. Will be having surgery next month.

## 2020-03-20 ENCOUNTER — TELEPHONE (OUTPATIENT)
Dept: FAMILY MEDICINE CLINIC | Facility: CLINIC | Age: 50
End: 2020-03-20

## 2020-03-20 RX ORDER — HYDROCODONE BITARTRATE AND ACETAMINOPHEN 10; 325 MG/1; MG/1
1-2 TABLET ORAL EVERY 8 HOURS PRN
Qty: 90 TABLET | Refills: 0 | Status: SHIPPED | OUTPATIENT
Start: 2020-03-20 | End: 2020-04-09

## 2020-03-20 RX ORDER — METHYLPREDNISOLONE 4 MG/1
TABLET ORAL
Qty: 1 KIT | Refills: 0 | Status: SHIPPED | OUTPATIENT
Start: 2020-03-20 | End: 2020-04-09 | Stop reason: ALTCHOICE

## 2020-03-20 RX ORDER — AZITHROMYCIN 250 MG/1
TABLET, FILM COATED ORAL
Qty: 6 TABLET | Refills: 0 | Status: SHIPPED | OUTPATIENT
Start: 2020-03-20 | End: 2020-04-09 | Stop reason: ALTCHOICE

## 2020-03-20 RX ORDER — TRAMADOL HYDROCHLORIDE 50 MG/1
TABLET ORAL
Qty: 120 TABLET | Refills: 0 | Status: SHIPPED | OUTPATIENT
Start: 2020-03-20 | End: 2020-04-15

## 2020-03-20 NOTE — TELEPHONE ENCOUNTER
She told me OR is cancelled. appt cancelled already. I told her to call her surgeon and will postpone elective surgery.     Please call [facility] to tell them it is ok to approve both meds

## 2020-03-20 NOTE — TELEPHONE ENCOUNTER
Called [facility], pharmacist states pt picked up Tramadol picked up 3/3/20 #120, can fill 4/1/20  Norco picked up 3/17/20 #90, can fill 4/14/20    Pt insisting on speaking with Dr. Soto Ahr          Reschedule H+P for when? OR scheduled for 4/14/20.

## 2020-03-20 NOTE — TELEPHONE ENCOUNTER
Spoke to pt, pt states she has spoken to Dr. Teofilo Rock. Inquired if pt is having SI/HI tendencies, pt states she \"is not\". Advised pt to not drive on these medications and pt states \"my  is driving me\".        Called pharmacy and gave approval. Ph

## 2020-03-20 NOTE — TELEPHONE ENCOUNTER
Spoke to patient,.  Elective procedure cancelled until further notice in lieu of current health crisis    She feels better from bronchitis but would like another round of steroids and Eric Machado to having appt with me cancelled

## 2020-03-20 NOTE — TELEPHONE ENCOUNTER
Bronchitis still lingering - still little hard to breathe with 99 temp.      Pt is asking if she should do another round of antibiotics?  _____________________________________    PT PHARMACY, [facility], WILL BE CLOSING.  _____________________________________

## 2020-04-02 RX ORDER — ALBUTEROL SULFATE 90 UG/1
AEROSOL, METERED RESPIRATORY (INHALATION)
Qty: 8.5 INHALER | Refills: 2 | Status: SHIPPED | OUTPATIENT
Start: 2020-04-02 | End: 2020-07-28

## 2020-04-02 NOTE — TELEPHONE ENCOUNTER
Name from pharmacy: ALBUTEROL HFA [facility]          Will file in chart as: ALBUTEROL SULFATE  (90 Base) MCG/ACT Inhalation Aero Soln         Sig: INHALE TWO PUFFS BY MOUTH EVERY 6 HOURS AS NEEDED FOR WHEEZING    Disp:  8.5 Inhaler    Refills:

## 2020-04-06 RX ORDER — CYCLOBENZAPRINE HCL 10 MG
TABLET ORAL
Qty: 30 TABLET | Refills: 5 | Status: SHIPPED | OUTPATIENT
Start: 2020-04-06 | End: 2020-05-19

## 2020-04-06 NOTE — TELEPHONE ENCOUNTER
Name from pharmacy: CYCLOBENZAPRINE 10 MG TABLET          Will file in chart as: CYCLOBENZAPRINE 10 MG Oral Tab         Sig: TAKE 1 TABLET BY MOUTH NIGHTLY AS NEEDED    Disp:  30 tablet    Refills:  5    Start: 4/6/2020    Class: Normal    Non-formulary

## 2020-04-15 ENCOUNTER — TELEPHONE (OUTPATIENT)
Dept: FAMILY MEDICINE CLINIC | Facility: CLINIC | Age: 50
End: 2020-04-15

## 2020-04-15 ENCOUNTER — VIRTUAL PHONE E/M (OUTPATIENT)
Dept: FAMILY MEDICINE CLINIC | Facility: CLINIC | Age: 50
End: 2020-04-15
Payer: COMMERCIAL

## 2020-04-15 DIAGNOSIS — M47.816 OSTEOARTHRITIS OF LUMBAR SPINE, UNSPECIFIED SPINAL OSTEOARTHRITIS COMPLICATION STATUS: ICD-10-CM

## 2020-04-15 PROCEDURE — 99213 OFFICE O/P EST LOW 20 MIN: CPT | Performed by: FAMILY MEDICINE

## 2020-04-15 RX ORDER — TRAMADOL HYDROCHLORIDE 50 MG/1
TABLET ORAL
Qty: 120 TABLET | Refills: 0 | Status: SHIPPED | OUTPATIENT
Start: 2020-04-15 | End: 2020-04-30

## 2020-04-15 NOTE — PATIENT INSTRUCTIONS
Thank you for choosing Tonia Alberts MD at [place] [address]  To Do: aYny Moon  1. Please take meds as directed. Tramadol have been sent to your pharmacy. [facility] is located in [address].   Monday, Tuesday & Friday – 8 a.m. to that our intention is that the benefits outweigh those potential risks and we strive to make you healthier and to improve your quality of life.     Referrals, and Radiology Information:    If your insurance requires a referral to a specialist, please allow

## 2020-04-15 NOTE — TELEPHONE ENCOUNTER
LMOM for pt informing her our office has not received notified of Clearance request. Pt's previous H+P was 3/4/20 and typically  [facility] requests H+P clearance within 30 days of OR.  Advised pt to inquire with Dr. Bernice Holliday office for their requirem

## 2020-04-15 NOTE — PROGRESS NOTES
Virtual Telephone Check-In    Sulema Moran verbally consents to a Virtual/Telephone Check-In visit on 04/15/20. Patient understands and accepts financial responsibility for any deductible, co-insurance and/or co-pays associated with this service. Tab Take 1 tablet by mouth daily. • ibuprofen 200 MG Oral Tab Take 600 mg by mouth 2 (two) times daily. • caffeine 200 MG Oral Tab Take 200 mg by mouth daily.        • EPINEPHrine (EPIPEN 2-HERRERA) 0.3 MG/0.3ML Injection Solution Auto-injector Inject 0

## 2020-04-15 NOTE — TELEPHONE ENCOUNTER
Pt states that she has been treated for pneumonia and is feeling better-she still uses the inhaler occasionally. Pt states that her surgery has been rescheduled for 5/26 in [address] [place] [address] Road is having prolapse, hernia, and bladder surgery.  Does patient need a

## 2020-04-30 ENCOUNTER — TELEPHONE (OUTPATIENT)
Dept: FAMILY MEDICINE CLINIC | Facility: CLINIC | Age: 50
End: 2020-04-30

## 2020-04-30 RX ORDER — TRAMADOL HYDROCHLORIDE 50 MG/1
TABLET ORAL
Qty: 120 TABLET | Refills: 0 | Status: SHIPPED | OUTPATIENT
Start: 2020-04-30 | End: 2020-05-12

## 2020-05-01 ENCOUNTER — VIRTUAL PHONE E/M (OUTPATIENT)
Dept: FAMILY MEDICINE CLINIC | Facility: CLINIC | Age: 50
End: 2020-05-01
Payer: COMMERCIAL

## 2020-05-01 DIAGNOSIS — N81.11 CYSTOCELE, MIDLINE: ICD-10-CM

## 2020-05-01 DIAGNOSIS — Z01.818 PREOP EXAMINATION: Primary | ICD-10-CM

## 2020-05-01 PROCEDURE — 99441 PHONE E/M BY PHYS 5-10 MIN: CPT | Performed by: FAMILY MEDICINE

## 2020-05-01 NOTE — PROGRESS NOTES
Virtual Telephone Check-In    Wing Gonzalo verbally consents to a Virtual/Telephone Check-In visit on 05/01/20. Patient understands and accepts financial responsibility for any deductible, co-insurance and/or co-pays associated with this service. History    Tobacco Use      Smoking status: Never Smoker      Smokeless tobacco: Never Used    Alcohol use: Not Currently      Comment: ocassionally     Drug use: No      Family History   Problem Relation Age of Onset   • Cancer Father         skin, prosta fever.   HENT: Negative for sore throat and trouble swallowing. Respiratory: Negative for cough and shortness of breath. Cardiovascular: Negative for chest pain, palpitations and leg swelling.    Gastrointestinal: Negative for nausea, vomiting, abdomi that this was a phone visit to provide more reassurance. Patient/Caregiver Education: Patient/Caregiver Education: There are no barriers to learning. Medical education done. Outcome: Patient verbalizes understanding.     Orders Placed This Encounter

## 2020-05-01 NOTE — PATIENT INSTRUCTIONS
Thank you for choosing Elaina Richmond MD at [place] [address]  To Do: [facility]  1. Considerations in the Preoperative period:   Surgery is a big deal.  Anesthesia and your medicines and medical issues all stress out your body.   Therefore natty agents:  Serotinin reuptake inhibitors like zoloft, effexor, paxil, prozac, citalopram, remeron etc. For mood should be held 3 weeks before surgery if high risk of bleeding as these may increase risk of bleeding  Most other psychiatric meds like antipsycho if you have not done so.  All your results will post on there.  [email or web address]  • You can NOW use [employer] to book your appointments with us, or consider using open access scheduling which is through the edward website [email or web address]. [employer] approved your testing, please call Central Scheduling at [phone]  Please allow our office 5 business days to contact you regarding any testing results.     Refill policies:   Allow 3 business days for refills; controlled substances may take longer and

## 2020-05-12 ENCOUNTER — VIRTUAL PHONE E/M (OUTPATIENT)
Dept: FAMILY MEDICINE CLINIC | Facility: CLINIC | Age: 50
End: 2020-05-12
Payer: COMMERCIAL

## 2020-05-12 DIAGNOSIS — T78.3XXA ANGIOEDEMA, INITIAL ENCOUNTER: Primary | ICD-10-CM

## 2020-05-12 DIAGNOSIS — G89.29 OTHER CHRONIC PAIN: ICD-10-CM

## 2020-05-12 PROCEDURE — 99441 PHONE E/M BY PHYS 5-10 MIN: CPT | Performed by: FAMILY MEDICINE

## 2020-05-12 RX ORDER — METHYLPREDNISOLONE 4 MG/1
TABLET ORAL
Qty: 1 KIT | Refills: 0 | Status: SHIPPED | OUTPATIENT
Start: 2020-05-12 | End: 2020-05-19 | Stop reason: ALTCHOICE

## 2020-05-12 RX ORDER — TRAMADOL HYDROCHLORIDE 50 MG/1
TABLET ORAL
Qty: 120 TABLET | Refills: 0 | Status: SHIPPED | OUTPATIENT
Start: 2020-05-12 | End: 2020-05-28

## 2020-05-12 NOTE — PROGRESS NOTES
Virtual Telephone Check-In    Jen Shelby verbally consents to a Virtual/Telephone Check-In visit on 05/12/20. Patient understands and accepts financial responsibility for any deductible, co-insurance and/or co-pays associated with this service.

## 2020-05-12 NOTE — PROGRESS NOTES
HPI:    Patient ID: Loan Salgado is a 51year old female. HPI  Ms. Milan Mendez is a pleasant 56-year-old female with known history of chronic pain secondary to lumbar degenerative arthritis and rectocele for which she will be having elective surger AS NEEDED FOR WHEEZING 8.5 Inhaler 2   • docusate sodium 100 MG Oral Cap Take 100 mg by mouth 2 (two) times daily. • diphenhydrAMINE HCl (BENADRYL ALLERGY OR) Take by mouth as needed.        • TRAZODONE HCL 50 MG Oral Tab TAKE 1 TABLET BY MOUTH NIGHTLY methylPREDNISolone (MEDROL) 4 MG Oral Tablet Therapy Pack 1 kit 0     Sig: As directed.    • traMADol HCl 50 MG Oral Tab 120 tablet 0     Sig: TAKE ONE TO TWO TABLETS BY MOUTH EVERY 6 HOURS AS NEEDED FOR PAIN       Imaging & Referrals:  None       #[id number]

## 2020-05-12 NOTE — PATIENT INSTRUCTIONS
Thank you for choosing Jamie Broderick MD at [place] [address]  To Do: Hortencia Eagle  1. Please take meds as directed. [facility] is located in [address]. Monday, Tuesday & Friday – 8 a.m. to 4 p.m.   Wednesday, Thursday – 7 a.m. to 3 outweigh those potential risks and we strive to make you healthier and to improve your quality of life.     Referrals, and Radiology Information:    If your insurance requires a referral to a specialist, please allow 5 business days to process your referral

## 2020-05-19 ENCOUNTER — APPOINTMENT (OUTPATIENT)
Dept: LAB | Age: 50
End: 2020-05-19
Attending: FAMILY MEDICINE
Payer: COMMERCIAL

## 2020-05-19 ENCOUNTER — OFFICE VISIT (OUTPATIENT)
Dept: FAMILY MEDICINE CLINIC | Facility: CLINIC | Age: 50
End: 2020-05-19
Payer: COMMERCIAL

## 2020-05-19 VITALS
WEIGHT: 245 LBS | RESPIRATION RATE: 16 BRPM | HEART RATE: 90 BPM | BODY MASS INDEX: 36.29 KG/M2 | SYSTOLIC BLOOD PRESSURE: 130 MMHG | OXYGEN SATURATION: 100 % | HEIGHT: 69 IN | DIASTOLIC BLOOD PRESSURE: 90 MMHG | TEMPERATURE: 97 F

## 2020-05-19 DIAGNOSIS — R03.0 ELEVATED BLOOD PRESSURE READING: ICD-10-CM

## 2020-05-19 DIAGNOSIS — N81.11 CYSTOCELE, MIDLINE: ICD-10-CM

## 2020-05-19 DIAGNOSIS — Z01.818 PREOP EXAMINATION: Primary | ICD-10-CM

## 2020-05-19 DIAGNOSIS — N81.6 RECTOCELE: ICD-10-CM

## 2020-05-19 PROCEDURE — 3075F SYST BP GE 130 - 139MM HG: CPT | Performed by: FAMILY MEDICINE

## 2020-05-19 PROCEDURE — 3080F DIAST BP >= 90 MM HG: CPT | Performed by: FAMILY MEDICINE

## 2020-05-19 PROCEDURE — 93000 ELECTROCARDIOGRAM COMPLETE: CPT | Performed by: FAMILY MEDICINE

## 2020-05-19 PROCEDURE — 99214 OFFICE O/P EST MOD 30 MIN: CPT | Performed by: FAMILY MEDICINE

## 2020-05-19 PROCEDURE — 85025 COMPLETE CBC W/AUTO DIFF WBC: CPT | Performed by: FAMILY MEDICINE

## 2020-05-19 PROCEDURE — 36415 COLL VENOUS BLD VENIPUNCTURE: CPT | Performed by: FAMILY MEDICINE

## 2020-05-19 PROCEDURE — 3008F BODY MASS INDEX DOCD: CPT | Performed by: FAMILY MEDICINE

## 2020-05-19 RX ORDER — HYDROCODONE BITARTRATE AND ACETAMINOPHEN 10; 325 MG/1; MG/1
TABLET ORAL
COMMUNITY
Start: 2020-04-20 | End: 2020-05-19

## 2020-05-19 RX ORDER — HYDROCODONE BITARTRATE AND ACETAMINOPHEN 10; 325 MG/1; MG/1
1-2 TABLET ORAL EVERY 8 HOURS PRN
Qty: 90 TABLET | Refills: 0 | Status: ON HOLD | OUTPATIENT
Start: 2020-05-19 | End: 2020-05-27

## 2020-05-19 RX ORDER — CYCLOBENZAPRINE HCL 10 MG
10 TABLET ORAL NIGHTLY PRN
Qty: 30 TABLET | Refills: 5 | Status: SHIPPED | OUTPATIENT
Start: 2020-05-19 | End: 2021-07-25

## 2020-05-19 NOTE — H&P
Preoperative History and Physical Internal Medicine    CC: Patient presents with:  Pre-Op Exam: 5/26/20/ANTERIOR POSTERIOR REPAIR OF CYSTOCELE, RECTOCELE, CYSTOSCOPY/ Dr Ean Cortez is 51year old presenting for a preoperative exam. (Other) Mother         copd       Allergies:    Latex                   ANAPHYLAXIS  Oxycodone               SWELLING    Comment:Leg swelling  Phentermine             HIVES, SWELLING  Lorazepam               OTHER (SEE COMMENTS)    Comment:Pt  said Gastrointestinal: Negative for nausea, vomiting, abdominal pain, diarrhea and constipation. Neurological: Negative for dizziness, weakness and headaches.        Physical Exam   /90 (BP Location: Right arm, Patient Position: Sitting, Cuff Size: lar reading  Cystocele, midline  Rectocele  Patient is medically cleared to undergo planned procedure. He is at low risk for developing perioperative and postoperative cardiac complications. Please contact my office if questions or concerns.  Patient was advi

## 2020-05-19 NOTE — PATIENT INSTRUCTIONS
Thank you for choosing Jennifer MD Dmitriy at [place] [address]  To Do: Leopoldo Hargrove  1. Considerations in the Preoperative period:   Surgery is a big deal.  Anesthesia and your medicines and medical issues all stress out your body.   Therefore natty agents:  Serotinin reuptake inhibitors like zoloft, effexor, paxil, prozac, citalopram, remeron etc. For mood should be held 3 weeks before surgery if high risk of bleeding as these may increase risk of bleeding  Most other psychiatric meds like antipsycho if you have not done so.  All your results will post on there.  [email or web address]  • You can NOW use [employer] to book your appointments with us, or consider using open access scheduling which is through the edward website [email or web address]. [employer] approved your testing, please call Central Scheduling at [phone]  Please allow our office 5 business days to contact you regarding any testing results.     Refill policies:   Allow 3 business days for refills; controlled substances may take longer and

## 2020-05-22 ENCOUNTER — TELEPHONE (OUTPATIENT)
Dept: FAMILY MEDICINE CLINIC | Facility: CLINIC | Age: 50
End: 2020-05-22

## 2020-05-22 NOTE — TELEPHONE ENCOUNTER
Elsa Alvarado is calling to ask that you fax pre op clear to  as they have not yet rec'd.     Fax [phone]

## 2020-05-25 ENCOUNTER — LAB ENCOUNTER (OUTPATIENT)
Dept: LAB | Facility: HOSPITAL | Age: 50
End: 2020-05-25
Attending: OBSTETRICS & GYNECOLOGY
Payer: COMMERCIAL

## 2020-05-25 DIAGNOSIS — Z01.818 PRE-OP TESTING: ICD-10-CM

## 2020-05-25 NOTE — H&P
[facility]  H&P   GYN ADMISSION NOTE    ADMISSION DATE:  5/26/2020  ADMITTING PHYSICIAN:  Carmen Sarmiento  ATTENDING PHYSICIAN:  Ernesto Salter MD  PRIMARY CARE PHYSICIAN:  Jamie Broderick MD  CODE STATUS:  Full Code    CHIEF COMPLAINT/REASON FOR A 1 tablet by mouth daily. ibuprofen 200 MG Oral Tab,  Take 600 mg by mouth 2 (two) times daily. caffeine 200 MG Oral Tab,  Take 200 mg by mouth daily.      EPINEPHrine (EPIPEN 2-HERRERA) 0.3 MG/0.3ML Injection Solution Auto-injector,  Inject 0.3 mL as direct status are appropriate, speech is understandable. No acute distress. HEAD: Normocephalic and atraumatic with normal hair distribution  NECK: Symmetrical and supple; trachea is midline; thyroid is smooth, not enlarged, and without nodules.   No lymphadeno cystoscopy.      Thorough discussion of surgical risks, benefits, and alternatives including, but not limited to bleeding/clots, infection, injury to nearby organs (urethra, bladder, ureters, bowel, blood vessels), mesh erosion/exposure, dyspareunia, de no for surgery.      Emaline Poster   5/25/2020

## 2020-05-26 ENCOUNTER — HOSPITAL ENCOUNTER (OUTPATIENT)
Facility: HOSPITAL | Age: 50
Discharge: HOME | End: 2020-05-27
Attending: OBSTETRICS & GYNECOLOGY | Admitting: OBSTETRICS & GYNECOLOGY
Payer: COMMERCIAL

## 2020-05-26 ENCOUNTER — ANESTHESIA EVENT (OUTPATIENT)
Dept: SURGERY | Facility: HOSPITAL | Age: 50
End: 2020-05-26
Payer: COMMERCIAL

## 2020-05-26 ENCOUNTER — ANESTHESIA (OUTPATIENT)
Dept: SURGERY | Facility: HOSPITAL | Age: 50
End: 2020-05-26
Payer: COMMERCIAL

## 2020-05-26 DIAGNOSIS — Z01.818 PRE-OP TESTING: Primary | ICD-10-CM

## 2020-05-26 PROBLEM — Z98.890 POSTOPERATIVE STATE: Status: ACTIVE | Noted: 2020-05-26

## 2020-05-26 PROCEDURE — 81025 URINE PREGNANCY TEST: CPT | Performed by: OBSTETRICS & GYNECOLOGY

## 2020-05-26 PROCEDURE — 0JQC0ZZ REPAIR PELVIC REGION SUBCUTANEOUS TISSUE AND FASCIA, OPEN APPROACH: ICD-10-PCS | Performed by: OBSTETRICS & GYNECOLOGY

## 2020-05-26 PROCEDURE — 0US97ZZ REPOSITION UTERUS, VIA NATURAL OR ARTIFICIAL OPENING: ICD-10-PCS | Performed by: OBSTETRICS & GYNECOLOGY

## 2020-05-26 PROCEDURE — 0TSD0ZZ REPOSITION URETHRA, OPEN APPROACH: ICD-10-PCS | Performed by: OBSTETRICS & GYNECOLOGY

## 2020-05-26 DEVICE — TRANSVAGINAL MID-URETHRAL SLING
Type: IMPLANTABLE DEVICE | Site: BLADDER | Status: FUNCTIONAL
Brand: ADVANTAGE FIT™ BLUE SYSTEM

## 2020-05-26 RX ORDER — SODIUM CHLORIDE, SODIUM LACTATE, POTASSIUM CHLORIDE, CALCIUM CHLORIDE 600; 310; 30; 20 MG/100ML; MG/100ML; MG/100ML; MG/100ML
INJECTION, SOLUTION INTRAVENOUS CONTINUOUS
Status: DISCONTINUED | OUTPATIENT
Start: 2020-05-26 | End: 2020-05-27

## 2020-05-26 RX ORDER — DEXAMETHASONE SODIUM PHOSPHATE 4 MG/ML
VIAL (ML) INJECTION AS NEEDED
Status: DISCONTINUED | OUTPATIENT
Start: 2020-05-26 | End: 2020-05-26 | Stop reason: SURG

## 2020-05-26 RX ORDER — OXYCODONE HYDROCHLORIDE AND ACETAMINOPHEN 5; 325 MG/1; MG/1
2 TABLET ORAL EVERY 4 HOURS PRN
Status: DISCONTINUED | OUTPATIENT
Start: 2020-05-26 | End: 2020-05-27

## 2020-05-26 RX ORDER — CLINDAMYCIN PHOSPHATE 900 MG/50ML
900 INJECTION INTRAVENOUS ONCE
Status: DISCONTINUED | OUTPATIENT
Start: 2020-05-26 | End: 2020-05-26 | Stop reason: HOSPADM

## 2020-05-26 RX ORDER — HYDROMORPHONE HYDROCHLORIDE 1 MG/ML
0.4 INJECTION, SOLUTION INTRAMUSCULAR; INTRAVENOUS; SUBCUTANEOUS EVERY 5 MIN PRN
Status: DISCONTINUED | OUTPATIENT
Start: 2020-05-26 | End: 2020-05-26 | Stop reason: HOSPADM

## 2020-05-26 RX ORDER — ONDANSETRON 4 MG/1
4 TABLET, FILM COATED ORAL EVERY 8 HOURS PRN
Status: DISCONTINUED | OUTPATIENT
Start: 2020-05-26 | End: 2020-05-27

## 2020-05-26 RX ORDER — ONDANSETRON 2 MG/ML
4 INJECTION INTRAMUSCULAR; INTRAVENOUS AS NEEDED
Status: DISCONTINUED | OUTPATIENT
Start: 2020-05-26 | End: 2020-05-26 | Stop reason: HOSPADM

## 2020-05-26 RX ORDER — SODIUM CHLORIDE, SODIUM LACTATE, POTASSIUM CHLORIDE, CALCIUM CHLORIDE 600; 310; 30; 20 MG/100ML; MG/100ML; MG/100ML; MG/100ML
INJECTION, SOLUTION INTRAVENOUS CONTINUOUS
Status: DISCONTINUED | OUTPATIENT
Start: 2020-05-26 | End: 2020-05-26 | Stop reason: HOSPADM

## 2020-05-26 RX ORDER — POLYETHYLENE GLYCOL 3350 17 G/17G
17 POWDER, FOR SOLUTION ORAL DAILY
Status: DISCONTINUED | OUTPATIENT
Start: 2020-05-26 | End: 2020-05-27

## 2020-05-26 RX ORDER — TRAZODONE HYDROCHLORIDE 50 MG/1
50 TABLET ORAL NIGHTLY
Status: DISCONTINUED | OUTPATIENT
Start: 2020-05-26 | End: 2020-05-27

## 2020-05-26 RX ORDER — KETOROLAC TROMETHAMINE 30 MG/ML
INJECTION, SOLUTION INTRAMUSCULAR; INTRAVENOUS AS NEEDED
Status: DISCONTINUED | OUTPATIENT
Start: 2020-05-26 | End: 2020-05-26 | Stop reason: SURG

## 2020-05-26 RX ORDER — ACETAMINOPHEN 500 MG
1000 TABLET ORAL ONCE
Status: DISCONTINUED | OUTPATIENT
Start: 2020-05-26 | End: 2020-05-26 | Stop reason: HOSPADM

## 2020-05-26 RX ORDER — METOCLOPRAMIDE HYDROCHLORIDE 5 MG/ML
10 INJECTION INTRAMUSCULAR; INTRAVENOUS AS NEEDED
Status: DISCONTINUED | OUTPATIENT
Start: 2020-05-26 | End: 2020-05-26 | Stop reason: HOSPADM

## 2020-05-26 RX ORDER — POLYETHYLENE GLYCOL 3350 17 G/17G
17 POWDER, FOR SOLUTION ORAL DAILY PRN
COMMUNITY

## 2020-05-26 RX ORDER — OXYCODONE HYDROCHLORIDE AND ACETAMINOPHEN 5; 325 MG/1; MG/1
1 TABLET ORAL EVERY 4 HOURS PRN
Status: DISCONTINUED | OUTPATIENT
Start: 2020-05-26 | End: 2020-05-27

## 2020-05-26 RX ORDER — ENOXAPARIN SODIUM 100 MG/ML
40 INJECTION SUBCUTANEOUS DAILY
Status: DISCONTINUED | OUTPATIENT
Start: 2020-05-26 | End: 2020-05-27

## 2020-05-26 RX ORDER — BUPIVACAINE HYDROCHLORIDE AND EPINEPHRINE 2.5; 5 MG/ML; UG/ML
INJECTION, SOLUTION EPIDURAL; INFILTRATION; INTRACAUDAL; PERINEURAL AS NEEDED
Status: DISCONTINUED | OUTPATIENT
Start: 2020-05-26 | End: 2020-05-26

## 2020-05-26 RX ORDER — ONDANSETRON 2 MG/ML
INJECTION INTRAMUSCULAR; INTRAVENOUS AS NEEDED
Status: DISCONTINUED | OUTPATIENT
Start: 2020-05-26 | End: 2020-05-26 | Stop reason: SURG

## 2020-05-26 RX ORDER — NALOXONE HYDROCHLORIDE 0.4 MG/ML
80 INJECTION, SOLUTION INTRAMUSCULAR; INTRAVENOUS; SUBCUTANEOUS AS NEEDED
Status: DISCONTINUED | OUTPATIENT
Start: 2020-05-26 | End: 2020-05-26 | Stop reason: HOSPADM

## 2020-05-26 RX ORDER — HYDROCODONE BITARTRATE AND ACETAMINOPHEN 5; 325 MG/1; MG/1
1 TABLET ORAL AS NEEDED
Status: DISCONTINUED | OUTPATIENT
Start: 2020-05-26 | End: 2020-05-26 | Stop reason: HOSPADM

## 2020-05-26 RX ORDER — METOCLOPRAMIDE HYDROCHLORIDE 5 MG/ML
10 INJECTION INTRAMUSCULAR; INTRAVENOUS EVERY 8 HOURS PRN
Status: DISCONTINUED | OUTPATIENT
Start: 2020-05-26 | End: 2020-05-27

## 2020-05-26 RX ORDER — MIDAZOLAM HYDROCHLORIDE 1 MG/ML
INJECTION INTRAMUSCULAR; INTRAVENOUS AS NEEDED
Status: DISCONTINUED | OUTPATIENT
Start: 2020-05-26 | End: 2020-05-26 | Stop reason: SURG

## 2020-05-26 RX ORDER — CEFAZOLIN SODIUM/WATER 2 G/20 ML
SYRINGE (ML) INTRAVENOUS AS NEEDED
Status: DISCONTINUED | OUTPATIENT
Start: 2020-05-26 | End: 2020-05-26 | Stop reason: SURG

## 2020-05-26 RX ORDER — HYDROMORPHONE HYDROCHLORIDE 1 MG/ML
0.8 INJECTION, SOLUTION INTRAMUSCULAR; INTRAVENOUS; SUBCUTANEOUS EVERY 2 HOUR PRN
Status: DISCONTINUED | OUTPATIENT
Start: 2020-05-26 | End: 2020-05-27

## 2020-05-26 RX ORDER — ONDANSETRON 2 MG/ML
4 INJECTION INTRAMUSCULAR; INTRAVENOUS EVERY 8 HOURS PRN
Status: DISCONTINUED | OUTPATIENT
Start: 2020-05-26 | End: 2020-05-27

## 2020-05-26 RX ORDER — MEPERIDINE HYDROCHLORIDE 25 MG/ML
12.5 INJECTION INTRAMUSCULAR; INTRAVENOUS; SUBCUTANEOUS AS NEEDED
Status: DISCONTINUED | OUTPATIENT
Start: 2020-05-26 | End: 2020-05-26 | Stop reason: HOSPADM

## 2020-05-26 RX ORDER — ALBUTEROL SULFATE 90 UG/1
2 AEROSOL, METERED RESPIRATORY (INHALATION) EVERY 6 HOURS PRN
Status: DISCONTINUED | OUTPATIENT
Start: 2020-05-26 | End: 2020-05-26

## 2020-05-26 RX ORDER — MIDAZOLAM HYDROCHLORIDE 1 MG/ML
INJECTION INTRAMUSCULAR; INTRAVENOUS
Status: COMPLETED
Start: 2020-05-26 | End: 2020-05-26

## 2020-05-26 RX ORDER — KETOROLAC TROMETHAMINE 30 MG/ML
30 INJECTION, SOLUTION INTRAMUSCULAR; INTRAVENOUS EVERY 8 HOURS
Status: COMPLETED | OUTPATIENT
Start: 2020-05-26 | End: 2020-05-27

## 2020-05-26 RX ORDER — SCOLOPAMINE TRANSDERMAL SYSTEM 1 MG/1
1 PATCH, EXTENDED RELEASE TRANSDERMAL ONCE
Status: DISCONTINUED | OUTPATIENT
Start: 2020-05-26 | End: 2020-05-27

## 2020-05-26 RX ORDER — LIDOCAINE HYDROCHLORIDE 10 MG/ML
INJECTION, SOLUTION EPIDURAL; INFILTRATION; INTRACAUDAL; PERINEURAL AS NEEDED
Status: DISCONTINUED | OUTPATIENT
Start: 2020-05-26 | End: 2020-05-26 | Stop reason: SURG

## 2020-05-26 RX ORDER — DIPHENHYDRAMINE HYDROCHLORIDE 50 MG/ML
12.5 INJECTION INTRAMUSCULAR; INTRAVENOUS AS NEEDED
Status: DISCONTINUED | OUTPATIENT
Start: 2020-05-26 | End: 2020-05-26 | Stop reason: HOSPADM

## 2020-05-26 RX ORDER — HYDROMORPHONE HYDROCHLORIDE 1 MG/ML
INJECTION, SOLUTION INTRAMUSCULAR; INTRAVENOUS; SUBCUTANEOUS
Status: COMPLETED
Start: 2020-05-26 | End: 2020-05-26

## 2020-05-26 RX ORDER — HYDROCODONE BITARTRATE AND ACETAMINOPHEN 5; 325 MG/1; MG/1
2 TABLET ORAL AS NEEDED
Status: DISCONTINUED | OUTPATIENT
Start: 2020-05-26 | End: 2020-05-26 | Stop reason: HOSPADM

## 2020-05-26 RX ORDER — MIDAZOLAM HYDROCHLORIDE 1 MG/ML
1 INJECTION INTRAMUSCULAR; INTRAVENOUS EVERY 5 MIN PRN
Status: DISCONTINUED | OUTPATIENT
Start: 2020-05-26 | End: 2020-05-26 | Stop reason: HOSPADM

## 2020-05-26 RX ORDER — DIPHENHYDRAMINE HYDROCHLORIDE 50 MG/ML
12.5 INJECTION INTRAMUSCULAR; INTRAVENOUS EVERY 4 HOURS PRN
Status: DISCONTINUED | OUTPATIENT
Start: 2020-05-26 | End: 2020-05-27

## 2020-05-26 RX ORDER — HYDROMORPHONE HYDROCHLORIDE 1 MG/ML
0.4 INJECTION, SOLUTION INTRAMUSCULAR; INTRAVENOUS; SUBCUTANEOUS EVERY 2 HOUR PRN
Status: DISCONTINUED | OUTPATIENT
Start: 2020-05-26 | End: 2020-05-27

## 2020-05-26 RX ORDER — ACETAMINOPHEN 500 MG
500 TABLET ORAL ONCE
Status: ON HOLD | COMMUNITY
End: 2020-05-26

## 2020-05-26 RX ORDER — ALBUTEROL SULFATE 2.5 MG/3ML
2.5 SOLUTION RESPIRATORY (INHALATION) EVERY 6 HOURS PRN
Status: DISCONTINUED | OUTPATIENT
Start: 2020-05-26 | End: 2020-05-27

## 2020-05-26 RX ORDER — KETAMINE HYDROCHLORIDE 50 MG/ML
INJECTION, SOLUTION, CONCENTRATE INTRAMUSCULAR; INTRAVENOUS AS NEEDED
Status: DISCONTINUED | OUTPATIENT
Start: 2020-05-26 | End: 2020-05-26 | Stop reason: SURG

## 2020-05-26 RX ORDER — HYDROMORPHONE HYDROCHLORIDE 1 MG/ML
0.2 INJECTION, SOLUTION INTRAMUSCULAR; INTRAVENOUS; SUBCUTANEOUS EVERY 2 HOUR PRN
Status: DISCONTINUED | OUTPATIENT
Start: 2020-05-26 | End: 2020-05-27

## 2020-05-26 RX ADMIN — LIDOCAINE HYDROCHLORIDE 50 MG: 10 INJECTION, SOLUTION EPIDURAL; INFILTRATION; INTRACAUDAL; PERINEURAL at 10:14:00

## 2020-05-26 RX ADMIN — DEXAMETHASONE SODIUM PHOSPHATE 8 MG: 4 MG/ML VIAL (ML) INJECTION at 10:27:00

## 2020-05-26 RX ADMIN — KETAMINE HYDROCHLORIDE 15 MG: 50 INJECTION, SOLUTION, CONCENTRATE INTRAMUSCULAR; INTRAVENOUS at 11:55:00

## 2020-05-26 RX ADMIN — MIDAZOLAM HYDROCHLORIDE 2 MG: 1 INJECTION INTRAMUSCULAR; INTRAVENOUS at 10:11:00

## 2020-05-26 RX ADMIN — ONDANSETRON 4 MG: 2 INJECTION INTRAMUSCULAR; INTRAVENOUS at 12:35:00

## 2020-05-26 RX ADMIN — KETOROLAC TROMETHAMINE 15 MG: 30 INJECTION, SOLUTION INTRAMUSCULAR; INTRAVENOUS at 12:35:00

## 2020-05-26 RX ADMIN — SODIUM CHLORIDE, SODIUM LACTATE, POTASSIUM CHLORIDE, CALCIUM CHLORIDE: 600; 310; 30; 20 INJECTION, SOLUTION INTRAVENOUS at 12:35:00

## 2020-05-26 RX ADMIN — KETAMINE HYDROCHLORIDE 15 MG: 50 INJECTION, SOLUTION, CONCENTRATE INTRAMUSCULAR; INTRAVENOUS at 12:32:00

## 2020-05-26 RX ADMIN — CEFAZOLIN SODIUM/WATER 2 G: 2 G/20 ML SYRINGE (ML) INTRAVENOUS at 10:37:00

## 2020-05-26 NOTE — ANESTHESIA PROCEDURE NOTES
Airway  Urgency: elective      General Information and Staff    Patient location during procedure: OR  Anesthesiologist: Caitlin South MD  Performed: anesthesiologist     Indications and Patient Condition  Indications for airway management: anesthesia  Cris

## 2020-05-26 NOTE — BRIEF OP NOTE
Pre-Operative Diagnosis: CYTOCELE, RECTOCELE, THANG     Post-Operative Diagnosis: CYSTOCELE, RECTOCELE, THANG, UTEROVAGINAL PROLAPSE      Procedure Performed:   Procedure(s):  ANTERIOR POSTERIOR REPAIR, HYSTEROPEXY, CYSTOSCOPY AND SLING PLACEMENT    Surgeon(s)

## 2020-05-26 NOTE — INTERVAL H&P NOTE
Pre-op Diagnosis: CYSTOSCELE, RECTOCELE, THANG    The above referenced H&P was reviewed by Luca Lucas MD on 5/26/2020, the patient was examined and no significant changes have occurred in the patient's condition since the H&P was performed.   I discusse

## 2020-05-26 NOTE — PLAN OF CARE
Problem: Patient/Family Goals  Goal: Patient/Family Long Term Goal  Description  Patient's Long Term Goal: discharge    Interventions:  -  Advance diet  - See additional Care Plan goals for specific interventions   Outcome: Progressing  Goal: Patient/Fam Assess for changes in respiratory status  - Assess for changes in mentation and behavior  - Position to facilitate oxygenation and minimize respiratory effort  - Oxygen supplementation based on oxygen saturation or ABGs  - Provide Smoking Cessation handout bladder emptying  Outcome: Progressing

## 2020-05-26 NOTE — PROGRESS NOTES
PT IS A&0 X 4. SHE IS ON A REGULAR DIET AFTER SHE TOLERATES FULL LIQUIDS. SHE HAS IV FLUIDS INFUSING. SHE HAS A HONEYCUTT CATHETER. PT HAS SCOPOLAMINE PATCH BEHIND HER RIGHT EAR. HER INCISION IS C/D/I AND HAD PERIPAD IN PLACE. WILL CONTINUE TO MONITOR.

## 2020-05-26 NOTE — ANESTHESIA POSTPROCEDURE EVALUATION
[address] Patient Status:  Outpatient in a Bed   Age/Gender 51year old female MRN [id number]   [facility] SURGERY Attending Adam Butler MD   Hosp Day # 0 PCP Sofi Grimes MD       Anesthesia Post-op Note

## 2020-05-26 NOTE — ANESTHESIA PREPROCEDURE EVALUATION
PRE-OP EVALUATION    Patient Name: Jose Stokes    Pre-op Diagnosis: CYSTOSCELE, RECTOCELE    Procedure(s):  ANTERIOR POSTERIOR REPAIR OF CYSTOCELE, RECTOCELE, CYSTOSCOPY    Surgeon(s) and Role:     Alex Terrell MD - Primary    Pre-op vitals EPINEPHrine (EPIPEN 2-HERRERA) 0.3 MG/0.3ML Injection Solution Auto-injector, Inject 0.3 mL as directed one time. , Disp: , Rfl:         Allergies: Latex; Oxycodone; Phentermine; Lorazepam; Penicillin G      Anesthesia Evaluation    Patient summary reviewed. • Cystocele, midline  • Rectocele  • Female stress incontinence

## 2020-05-26 NOTE — PLAN OF CARE
NURSING ADMISSION NOTE      Patient admitted via Wheelchair  Oriented to room. Safety precautions initiated. Bed in low position. Call light in reach. [facility] PACU IN STABLE CONDITION VIA BED.      Problem: Patient/Family Goals  Goal consult to assist with strengthening/mobility  - Encourage toileting schedule  Outcome: Progressing     Problem: RESPIRATORY - ADULT  Goal: Achieves optimal ventilation and oxygenation  Description  INTERVENTIONS:  - Assess for changes in respiratory statu intake/output and perform bladder scan as needed  - Follow urinary retention protocol/standard of care  - Consider collaborating with pharmacy to review patient's medication profile  - Implement strategies to promote bladder emptying  Outcome: Progressing

## 2020-05-27 ENCOUNTER — TELEPHONE (OUTPATIENT)
Dept: FAMILY MEDICINE CLINIC | Facility: CLINIC | Age: 50
End: 2020-05-27

## 2020-05-27 ENCOUNTER — VIRTUAL PHONE E/M (OUTPATIENT)
Dept: FAMILY MEDICINE CLINIC | Facility: CLINIC | Age: 50
End: 2020-05-27
Payer: COMMERCIAL

## 2020-05-27 VITALS
HEIGHT: 69 IN | BODY MASS INDEX: 36.77 KG/M2 | TEMPERATURE: 98 F | SYSTOLIC BLOOD PRESSURE: 140 MMHG | OXYGEN SATURATION: 98 % | DIASTOLIC BLOOD PRESSURE: 76 MMHG | RESPIRATION RATE: 18 BRPM | HEART RATE: 90 BPM | WEIGHT: 248.25 LBS

## 2020-05-27 DIAGNOSIS — G89.18 POST-OP PAIN: Primary | ICD-10-CM

## 2020-05-27 PROCEDURE — 99441 PHONE E/M BY PHYS 5-10 MIN: CPT | Performed by: FAMILY MEDICINE

## 2020-05-27 PROCEDURE — 80048 BASIC METABOLIC PNL TOTAL CA: CPT | Performed by: OBSTETRICS & GYNECOLOGY

## 2020-05-27 PROCEDURE — 85025 COMPLETE CBC W/AUTO DIFF WBC: CPT | Performed by: OBSTETRICS & GYNECOLOGY

## 2020-05-27 RX ORDER — HYDROMORPHONE HYDROCHLORIDE 1 MG/ML
0.4 INJECTION, SOLUTION INTRAMUSCULAR; INTRAVENOUS; SUBCUTANEOUS ONCE
Status: COMPLETED | OUTPATIENT
Start: 2020-05-27 | End: 2020-05-27

## 2020-05-27 RX ORDER — OXYCODONE HYDROCHLORIDE AND ACETAMINOPHEN 5; 325 MG/1; MG/1
1-2 TABLET ORAL EVERY 4 HOURS PRN
Qty: 20 TABLET | Refills: 0 | Status: SHIPPED | OUTPATIENT
Start: 2020-05-27 | End: 2020-06-12

## 2020-05-27 RX ORDER — IBUPROFEN 600 MG/1
600 TABLET ORAL EVERY 6 HOURS PRN
Qty: 30 TABLET | Refills: 1 | Status: SHIPPED | OUTPATIENT
Start: 2020-05-27 | End: 2020-12-22

## 2020-05-27 RX ORDER — OXYCODONE HYDROCHLORIDE AND ACETAMINOPHEN 5; 325 MG/1; MG/1
1-2 TABLET ORAL EVERY 6 HOURS PRN
Qty: 60 TABLET | Refills: 0 | Status: SHIPPED | OUTPATIENT
Start: 2020-05-27 | End: 2020-05-28

## 2020-05-27 NOTE — PROGRESS NOTES
Virtual Telephone Check-In    Creed Adjutant verbally consents to a Virtual/Telephone Check-In visit on 05/27/20. Patient has been referred to the [facility] website at [email or web address] to review the yearly Consent to Treat document.     Patient und

## 2020-05-27 NOTE — PROGRESS NOTES
51year old y/o female s/p A&P repair, hysteropexy, sling, cystoscopy POD #1  Pt doing well. No complaints. Denies CP or SOB. Pain well controlled with Percocet meds. Tolerates PO diet. Ambulates without difficulty.  Feels comfortable going home with cathet

## 2020-05-27 NOTE — TELEPHONE ENCOUNTER
Future Appointments   Date Time Provider Miguel Wendy   5/27/2020  4:30 PM Sury Potts MD [facility] [address]   6/3/2020  8:00 AM EDW RN [address]   7/8/2020 10:20 AM Aleida Moura MD [address]     Patient verbally con

## 2020-05-27 NOTE — PATIENT INSTRUCTIONS
Thank you for choosing Franca Vazquez MD at [place] [address]  To Do: Ayo   1. Please take meds as directed. [facility] is located in [address]. Monday, Tuesday & Friday – 8 a.m. to 4 p.m.   Wednesday, Thursday – 7 a.m. to 3 outweigh those potential risks and we strive to make you healthier and to improve your quality of life.     Referrals, and Radiology Information:    If your insurance requires a referral to a specialist, please allow 5 business days to process your referral

## 2020-05-27 NOTE — PLAN OF CARE
Patient is A/Ox4. RA. SCDs. Ocampo, drainingclear yellow urine. Katarzyna pad in use, pt states she is on her period. Pain controlled with IV Toradol and Percocet. Up ad suraj. No BM overnight. IV saline locked. Lap x2 with skin glue.  Nausea X1, Zofran given with call for assistance with activity based on assessment  - Modify environment to reduce risk of injury  - Provide assistive devices as appropriate  - Consider OT/PT consult to assist with strengthening/mobility  - Encourage toileting schedule  Outcome: Progr Evaluate fluid balance  Outcome: Progressing  Goal: Maintains or returns to baseline bowel function  Description  INTERVENTIONS:  - Assess bowel function  - Maintain adequate hydration with IV or PO as ordered and tolerated  - Evaluate effectiveness of GI

## 2020-05-27 NOTE — TELEPHONE ENCOUNTER
Pt calling refusing  to speak to a nurse wants to speak to  dr William Mauricio in regards to getting a script for percoset ,I sent message to dr William Mauricio

## 2020-05-27 NOTE — TELEPHONE ENCOUNTER
I think she is still in the hospital because of surgery. Unfortunate I cannot give her Percocet at this point. I did prescribe her with Norco previously and tramadol.

## 2020-05-27 NOTE — PROGRESS NOTES
Patient discharged to home. Discharge instructions reviewed with the patient, and the patient verbalized her understanding of her discharge instructions. Prescription for Percocet filled to the patient by the [facility].  Ocampo catheter care moreno

## 2020-05-27 NOTE — OPERATIVE REPORT
Arthur Ravindra   1970  MRN: [id number]  Date of Surgery: 2020    Pre-operative diagnosis:    1. Cystocele, rectocele  2. Stress urinary incontinence. Post-operative diagnosis:  1. Uterovaginal prolapse, cystocele, rectocele  2.  Stress The excess vaginal epithelium was excised and the midline incision was closed with 2-0 Vicryl suture in a running locking fashion. Attention was then placed to the midurethra.  Diluted Marcaine with epinephrine were injected in the periurethral area jun defect. Rectal examination confirmed that none of these sutures had impinged the rectum. 2-0 Vicryl sutures were then used to reaproximate the vaginal epithelium in the midline in a running locking fashion. A small perineorrhaphy completed the repair.

## 2020-05-27 NOTE — PROGRESS NOTES
HPI:    Patient ID: Isabela Tan is a 51year old female. HPI  Ms. Miguelangel Fields is a 28-year-old female presenting for a phone visit. She was just discharged a few minutes to go from [facility] after she had surgery for rectocele repair.   She Oral Tab TAKE 1 TABLET BY MOUTH NIGHTLY AS NEEDED 30 tablet 1   • cetirizine 10 MG Oral Tab Take 10 mg by mouth daily as needed. • Multiple Vitamin (MULTI-VITAMIN DAILY) Oral Tab Take 1 tablet by mouth daily.      • caffeine 200 MG Oral Tab Take 200 m

## 2020-05-27 NOTE — PROGRESS NOTES
05/27/20 8143   Clinical Encounter Type   Visited With Patient   Routine Visit   (Responded to consult regarding Advance Directives )   Per consult,  invited patient into an Advanced Directive conversation.   The patient do not want to complete i

## 2020-05-28 ENCOUNTER — TELEPHONE (OUTPATIENT)
Dept: UROLOGY | Facility: HOSPITAL | Age: 50
End: 2020-05-28

## 2020-05-28 ENCOUNTER — VIRTUAL PHONE E/M (OUTPATIENT)
Dept: FAMILY MEDICINE CLINIC | Facility: CLINIC | Age: 50
End: 2020-05-28
Payer: COMMERCIAL

## 2020-05-28 DIAGNOSIS — G89.18 POST-OP PAIN: Primary | ICD-10-CM

## 2020-05-28 PROCEDURE — 99441 PHONE E/M BY PHYS 5-10 MIN: CPT | Performed by: FAMILY MEDICINE

## 2020-05-28 PROCEDURE — 1111F DSCHRG MED/CURRENT MED MERGE: CPT | Performed by: FAMILY MEDICINE

## 2020-05-28 RX ORDER — OXYCODONE AND ACETAMINOPHEN 10; 325 MG/1; MG/1
1-2 TABLET ORAL EVERY 4 HOURS PRN
Qty: 60 TABLET | Refills: 0 | Status: SHIPPED | OUTPATIENT
Start: 2020-05-28 | End: 2020-06-12

## 2020-05-28 RX ORDER — TRAMADOL HYDROCHLORIDE 50 MG/1
TABLET ORAL
Qty: 120 TABLET | Refills: 0 | Status: SHIPPED | OUTPATIENT
Start: 2020-05-28 | End: 2020-06-12

## 2020-05-28 NOTE — PROGRESS NOTES
Virtual Telephone Check-In    Tiffanie Euceda verbally consents to a Virtual/Telephone Check-In visit on 05/28/20. Patient has been referred to the [facility] website at [email or web address] to review the yearly Consent to Treat document.     Patient und

## 2020-05-28 NOTE — PROGRESS NOTES
HPI:    Patient ID: Mk Duval is a 51year old female. HPI  Ms. Joni Acuña is a 45-year-old female presenting for a phone visit. She was discharged yesterday from [facility] after she had a rectocele repair surgery.   She has been having p diphenhydrAMINE HCl (BENADRYL ALLERGY OR) Take 1 tablet by mouth as needed (Allergies). • TRAZODONE HCL 50 MG Oral Tab TAKE 1 TABLET BY MOUTH NIGHTLY AS NEEDED 30 tablet 1   • cetirizine 10 MG Oral Tab Take 10 mg by mouth daily as needed.        • Mul needed a day       Imaging & Referrals:  None       #[id number]

## 2020-05-28 NOTE — TELEPHONE ENCOUNTER
Pt phoned RN line wanting to move her VT from wed to tue. Pt reports Dr Lopez Fredrick \"insisted\" her VT has to be moved to Tuesday.  Explained to pt that VT was booked for Wed b/c Rob Perez is our busiest doctor clinic day and we do not usually do any RN visits on T

## 2020-05-28 NOTE — PATIENT INSTRUCTIONS
Thank you for choosing Nicole Gonzales MD at [place] [address]  To Do: Jourdan Tomas  1. Please take meds as directed. [facility] is located in [address]. Monday, Tuesday & Friday – 8 a.m. to 4 p.m.   Wednesday, Thursday – 7 a.m. to 3 outweigh those potential risks and we strive to make you healthier and to improve your quality of life.     Referrals, and Radiology Information:    If your insurance requires a referral to a specialist, please allow 5 business days to process your referral

## 2020-05-28 NOTE — TELEPHONE ENCOUNTER
- Pt forgot to ask for refill during phone visit yesterday. Dr. Bessy Burns said it was ok to also take this medication.       [address] [facility] [phone], [phone]   Outpatient Medication Detail      [place] [facility]

## 2020-06-01 ENCOUNTER — TELEPHONE (OUTPATIENT)
Dept: UROLOGY | Facility: HOSPITAL | Age: 50
End: 2020-06-01

## 2020-06-01 NOTE — TELEPHONE ENCOUNTER
Called to check on patient and see if she is having bowel movements, also called to verify her apt at 7:30am tomorrow, urged her to call the office today with questions

## 2020-06-02 ENCOUNTER — OFFICE VISIT (OUTPATIENT)
Dept: UROLOGY | Facility: HOSPITAL | Age: 50
End: 2020-06-02
Attending: OBSTETRICS & GYNECOLOGY
Payer: COMMERCIAL

## 2020-06-02 VITALS
BODY MASS INDEX: 37 KG/M2 | WEIGHT: 248 LBS | SYSTOLIC BLOOD PRESSURE: 146 MMHG | DIASTOLIC BLOOD PRESSURE: 86 MMHG | TEMPERATURE: 98 F

## 2020-06-02 DIAGNOSIS — Z98.890 POST-OPERATIVE STATE: Primary | ICD-10-CM

## 2020-06-02 DIAGNOSIS — R33.8 POSTOPERATIVE URINARY RETENTION: ICD-10-CM

## 2020-06-02 DIAGNOSIS — N99.89 POSTOPERATIVE URINARY RETENTION: ICD-10-CM

## 2020-06-02 PROCEDURE — 99212 OFFICE O/P EST SF 10 MIN: CPT

## 2020-06-02 NOTE — PATIENT INSTRUCTIONS
Voiding Trial Instructions  You have passed your voiding trial at 8am.  Please make sure you are drinking some water today. You can take your Motrin to help with any swelling from the catheter.   It is important to try and empty your bladder every two hour

## 2020-06-02 NOTE — PROGRESS NOTES
..Patient here for voiding trial.  Patient reports having 1-2 BM's a day, still taking Percocet and Motrin, denies any chest pain, SOB, eating with any nausea, small amount of vaginal discharge noted, wearing a mini-pad, taking Benifiber and Miralax, encou

## 2020-06-04 RX ORDER — OXYCODONE AND ACETAMINOPHEN 10; 325 MG/1; MG/1
1 TABLET ORAL EVERY 4 HOURS PRN
Qty: 60 TABLET | Refills: 0 | Status: SHIPPED | OUTPATIENT
Start: 2020-06-04 | End: 2020-07-15

## 2020-06-04 NOTE — TELEPHONE ENCOUNTER
Requesting Oxycodone-Acetaminophen 10/325mg  LOV: 5/28/2020 Telephone Visit  RTC: prn  Last Relevant Labs: 924/19  Filled: 5/28/2020 #60 with 0 refills    Future Appointments   Date Time Provider Miguel Nguyen   7/8/2020 10:20 AM Mike Pacheco MD E

## 2020-06-04 NOTE — TELEPHONE ENCOUNTER
Patient states that she needs a refill on the Percoset 10 mg. She will need this called into the [facility] in [place]. This refill is due Sunday.

## 2020-06-08 ENCOUNTER — TELEPHONE (OUTPATIENT)
Dept: UROLOGY | Facility: HOSPITAL | Age: 50
End: 2020-06-08

## 2020-06-08 NOTE — TELEPHONE ENCOUNTER
Patient has question regarding if she can just  her heated pool with her children. Pt states no swimming. Pt had surgery on 5/26/20. APR, HYSTEROPEXY CYSTO AND SLING. Informed patient will contact Dr. Aguilar Calvillo and will call her back.

## 2020-06-08 NOTE — TELEPHONE ENCOUNTER
Spoke with Dr. Grant Bledsoe. Patient can dip toes in the pool. No getting in the pool for at least 1 month. Called patient with this information, pt verbalizes understanding and will wait the 1month.

## 2020-06-11 RX ORDER — TRAMADOL HYDROCHLORIDE 50 MG/1
TABLET ORAL
Qty: 120 TABLET | Refills: 0 | OUTPATIENT
Start: 2020-06-11

## 2020-06-11 NOTE — TELEPHONE ENCOUNTER
TRAMADOL HCL 05/28/2020 05/28/2020  120 tablet  15 PRANAV SRIVASTAVA (MD) [address]     Patient has appointment tomorrow with Dr Angela Edmondson

## 2020-06-12 ENCOUNTER — VIRTUAL PHONE E/M (OUTPATIENT)
Dept: FAMILY MEDICINE CLINIC | Facility: CLINIC | Age: 50
End: 2020-06-12
Payer: COMMERCIAL

## 2020-06-12 DIAGNOSIS — R10.2 PELVIC PAIN: ICD-10-CM

## 2020-06-12 DIAGNOSIS — G89.29 OTHER CHRONIC PAIN: Primary | ICD-10-CM

## 2020-06-12 PROCEDURE — 99441 PHONE E/M BY PHYS 5-10 MIN: CPT | Performed by: FAMILY MEDICINE

## 2020-06-12 RX ORDER — OXYCODONE AND ACETAMINOPHEN 10; 325 MG/1; MG/1
1-2 TABLET ORAL EVERY 4 HOURS PRN
Qty: 60 TABLET | Refills: 0 | Status: SHIPPED | OUTPATIENT
Start: 2020-06-14 | End: 2020-06-22

## 2020-06-12 RX ORDER — TRAMADOL HYDROCHLORIDE 50 MG/1
TABLET ORAL
Qty: 120 TABLET | Refills: 0 | Status: SHIPPED | OUTPATIENT
Start: 2020-06-12 | End: 2020-06-26

## 2020-06-12 NOTE — PATIENT INSTRUCTIONS
Thank you for choosing Ashley Barclay MD at [place] [address]  To Do: Corby Medrano  1. Please take meds as directed. [facility] is located in [address]. Monday, Tuesday & Friday – 8 a.m. to 4 p.m.   Wednesday, Thursday – 7 a.m. to 3 outweigh those potential risks and we strive to make you healthier and to improve your quality of life.     Referrals, and Radiology Information:    If your insurance requires a referral to a specialist, please allow 5 business days to process your referral

## 2020-06-12 NOTE — PROGRESS NOTES
HPI:    Patient ID: Mk Duval is a 51year old female. HPI  Ms. Joni Acuña is a 52-year-old female presenting for a phone visit. She recently underwent repair surgery for rectocele.   She is calling as she needs refills for tramadol and Percoce Tab TAKE 1 TABLET BY MOUTH NIGHTLY AS NEEDED (Patient not taking: Reported on 6/2/2020) 30 tablet 1   • cetirizine 10 MG Oral Tab Take 10 mg by mouth daily as needed. • Multiple Vitamin (MULTI-VITAMIN DAILY) Oral Tab Take 1 tablet by mouth daily.

## 2020-06-12 NOTE — PROGRESS NOTES
Virtual Telephone Check-In    Dylan Cook verbally consents to a Virtual/Telephone Check-In visit on 06/12/20. Patient has been referred to the [facility] website at [email or web address]/consents to review the yearly Consent to Treat document.     Patient und

## 2020-06-22 RX ORDER — OXYCODONE AND ACETAMINOPHEN 10; 325 MG/1; MG/1
1-2 TABLET ORAL EVERY 4 HOURS PRN
Qty: 60 TABLET | Refills: 0 | Status: SHIPPED | OUTPATIENT
Start: 2020-06-22 | End: 2020-06-30

## 2020-06-22 NOTE — TELEPHONE ENCOUNTER
Requesting Oxycodone-Acetaminophen 10/325  LOV: 6/12/2020  RTC: prn  Last Relevant Labs: annual labs done 9/24/19  Filled: 6/14/2020 #60 with 0 refills    Future Appointments   Date Time Provider Miguel Nguyen   7/8/2020 10:20 AM Melody Noguera MD E

## 2020-06-22 NOTE — TELEPHONE ENCOUNTER
Patient will need prescription for her Percoset sent to the [facility] in [place]. She will need 10 days worth.

## 2020-06-26 RX ORDER — TRAMADOL HYDROCHLORIDE 50 MG/1
TABLET ORAL
Qty: 120 TABLET | Refills: 0 | Status: SHIPPED | OUTPATIENT
Start: 2020-06-26 | End: 2020-07-08

## 2020-06-26 NOTE — TELEPHONE ENCOUNTER
Requesting : TRAMADOL HCL 50 MG   LOV: 6/12/20  RTC:   Last Relevant Labs: 9/24/19  Filled: 6/12/20  #120 with 0 refills    Future Appointments   Date Time Provider Miguel Pinedai   7/8/2020 10:20 AM Sherry Contreras MD [address]     TRAMADOL HCL TAB 50MG 05/28/2020   120 Undefined  [address] - . ..

## 2020-06-26 NOTE — TELEPHONE ENCOUNTER
Please call [facility]. I did refill the tramadol but I am just concerned that we are refusing this earlier than usual.  Last refill was 2 weeks. And is that she has been having pelvic pain post surgery. Please clarify if this is the case still.

## 2020-06-30 RX ORDER — OXYCODONE AND ACETAMINOPHEN 10; 325 MG/1; MG/1
1-2 TABLET ORAL EVERY 4 HOURS PRN
Qty: 60 TABLET | Refills: 0 | Status: SHIPPED | OUTPATIENT
Start: 2020-06-30 | End: 2020-06-30

## 2020-06-30 RX ORDER — OXYCODONE AND ACETAMINOPHEN 10; 325 MG/1; MG/1
1-2 TABLET ORAL EVERY 4 HOURS PRN
Qty: 60 TABLET | Refills: 0 | Status: SHIPPED | OUTPATIENT
Start: 2020-06-30 | End: 2020-07-08

## 2020-06-30 NOTE — TELEPHONE ENCOUNTER
Patient called, wants prescription to be resent to [facility]. [facility] will not fill, has canceled prescription.

## 2020-06-30 NOTE — TELEPHONE ENCOUNTER
Requesting Oxycodone 10/325mg  LOV: 6/12/2020  RTC: not specified in notes  Last Relevant Labs: annual labs done 9/24/19  Filled: 6/22/2020 #60 with 0 refills    Future Appointments   Date Time Provider Miguel Nguyen   7/8/2020 10:20 AM Porter Orosco

## 2020-06-30 NOTE — TELEPHONE ENCOUNTER
Patient calling for refill on Oxycodone, wants to have medication before the weekend/holiday. Patient has upcoming appt with Dr Justine Driscoll on 7-8.  Please advise

## 2020-07-08 RX ORDER — OXYCODONE AND ACETAMINOPHEN 10; 325 MG/1; MG/1
1-2 TABLET ORAL EVERY 4 HOURS PRN
Qty: 60 TABLET | Refills: 0 | Status: SHIPPED | OUTPATIENT
Start: 2020-07-08 | End: 2020-07-09

## 2020-07-08 RX ORDER — TRAMADOL HYDROCHLORIDE 50 MG/1
TABLET ORAL
Qty: 120 TABLET | Refills: 0 | Status: SHIPPED | OUTPATIENT
Start: 2020-07-08 | End: 2020-07-15

## 2020-07-08 NOTE — TELEPHONE ENCOUNTER
Pt requesting refill on Percocet.     Requesting Oxycodone-Acetaminophen 10/325mg  LOV: 6/12/2020  RTC: prn  Last Relevant Labs: 9/24/19  Filled: 6/30/2020 #60 with 0 refills    Future Appointments   Date Time Provider Miguel Nguyen   7/15/2020  9:40 AM

## 2020-07-08 NOTE — TELEPHONE ENCOUNTER
Requesting tramadol 50mg  LOV: 6/12/20  RTC:   Last Relevant Labs:   Filled: 6/26/20 #120 with 0 refills    Future Appointments   Date Time Provider Miguel Nguyen   7/15/2020  9:40 AM Vani Matthews MD [address]     TRAMADOL HCL 06/26/20

## 2020-07-08 NOTE — TELEPHONE ENCOUNTER
Please inform Leonie that I can refill this but we may need to wean her off of this in the near future. I am also thinking of referring her to pain management.

## 2020-07-09 RX ORDER — OXYCODONE AND ACETAMINOPHEN 10; 325 MG/1; MG/1
1-2 TABLET ORAL EVERY 4 HOURS PRN
Qty: 60 TABLET | Refills: 0 | Status: SHIPPED | OUTPATIENT
Start: 2020-07-09 | End: 2020-07-15

## 2020-07-14 ENCOUNTER — TELEPHONE (OUTPATIENT)
Dept: FAMILY MEDICINE CLINIC | Facility: CLINIC | Age: 50
End: 2020-07-14

## 2020-07-14 NOTE — TELEPHONE ENCOUNTER
Future Appointments   Date Time Provider Miguel Nguyen   7/15/2020  9:40 AM Sigifredo Alvarez MD [address]   7/15/2020  2:00 PM Mai Winchester MD [facility] [address]     Patient verbally consents to a virtual/telephone check-in service for the date and time noted above. Patient understands and accepts financial responsibility for any deductible, co-insurance, and co-pays associated with this service.

## 2020-07-15 ENCOUNTER — VIRTUAL PHONE E/M (OUTPATIENT)
Dept: FAMILY MEDICINE CLINIC | Facility: CLINIC | Age: 50
End: 2020-07-15
Payer: COMMERCIAL

## 2020-07-15 ENCOUNTER — TELEPHONE (OUTPATIENT)
Dept: FAMILY MEDICINE CLINIC | Facility: CLINIC | Age: 50
End: 2020-07-15

## 2020-07-15 ENCOUNTER — OFFICE VISIT (OUTPATIENT)
Dept: UROLOGY | Facility: HOSPITAL | Age: 50
End: 2020-07-15
Attending: OBSTETRICS & GYNECOLOGY
Payer: COMMERCIAL

## 2020-07-15 VITALS
HEIGHT: 69 IN | BODY MASS INDEX: 36.73 KG/M2 | SYSTOLIC BLOOD PRESSURE: 165 MMHG | WEIGHT: 248 LBS | DIASTOLIC BLOOD PRESSURE: 96 MMHG

## 2020-07-15 DIAGNOSIS — G89.18 POST-OP PAIN: Primary | ICD-10-CM

## 2020-07-15 DIAGNOSIS — Z98.890 POST-OPERATIVE STATE: Primary | ICD-10-CM

## 2020-07-15 DIAGNOSIS — G89.18 POST-OP PAIN: ICD-10-CM

## 2020-07-15 DIAGNOSIS — M62.89 PELVIC FLOOR TENSION: ICD-10-CM

## 2020-07-15 DIAGNOSIS — L50.9 HIVES: ICD-10-CM

## 2020-07-15 PROCEDURE — 99441 PHONE E/M BY PHYS 5-10 MIN: CPT | Performed by: FAMILY MEDICINE

## 2020-07-15 PROCEDURE — 99212 OFFICE O/P EST SF 10 MIN: CPT

## 2020-07-15 RX ORDER — HYDROCODONE BITARTRATE AND ACETAMINOPHEN 10; 325 MG/1; MG/1
1 TABLET ORAL
Qty: 120 TABLET | Refills: 0 | Status: SHIPPED | OUTPATIENT
Start: 2020-07-15 | End: 2020-07-23

## 2020-07-15 RX ORDER — TRAMADOL HYDROCHLORIDE 50 MG/1
TABLET ORAL EVERY 6 HOURS PRN
Qty: 120 TABLET | Refills: 0 | Status: SHIPPED | OUTPATIENT
Start: 2020-07-15 | End: 2020-08-03

## 2020-07-15 NOTE — TELEPHONE ENCOUNTER
Pt needs to speak to a nurse in regards to her pain meds ,stated the dosage is incorrect, please call pt

## 2020-07-15 NOTE — PROGRESS NOTES
Virtual Telephone Check-In    Creed Adjutant verbally consents to a Virtual/Telephone Check-In visit on 07/15/20. Patient has been referred to the [facility] website at [email or web address].[email or web address]/consents to review the yearly Consent to Treat document.     Patient und

## 2020-07-15 NOTE — TELEPHONE ENCOUNTER
Pharmacy needs verbal from provider to start patient on Norco, to discontinue percocet. Patient will need to start medication today.

## 2020-07-15 NOTE — PROGRESS NOTES
Dylan Cook  9/16/1970  7/15/20     Patient presents with:  Post-Op       HPI:  Flo Brittle is a 51year-old female who is status post Anterior and posterior colporrhaphy, bilateral uterosacral hysteropexy, retropubic midurethral sling (Advantage Fit), 2/5     Impression:  Post-operative state  (primary encounter diagnosis)  Pelvic floor tension  Post-op pain    Plan:  The patient has recovered well since surgery. She has good anatomic and functional results.   She may resume all physical activities incl

## 2020-07-15 NOTE — PROGRESS NOTES
HPI:    Patient ID: Alfie Gil is a 51year old female. HPI  Ms. Alexis Nickerson is a pleasant 52 y/o F with history of lumbar DJD and allergies who recently underwent pelvic surgery.   She had a visit with her surgeon earlier today and was advised fo diphenhydrAMINE HCl (BENADRYL ALLERGY OR) Take 1 tablet by mouth as needed (Allergies).        • TRAZODONE HCL 50 MG Oral Tab TAKE 1 TABLET BY MOUTH NIGHTLY AS NEEDED (Patient not taking: Reported on 6/2/2020) 30 tablet 1   • cetirizine 10 MG Oral Tab Take needed for Pain.        Imaging & Referrals:  None       #[id number]

## 2020-07-15 NOTE — TELEPHONE ENCOUNTER
Called pharmacy and per Dr. Ina Goodell note gave a verbal to stop percocet and start [address] [facility].

## 2020-07-15 NOTE — PATIENT INSTRUCTIONS
Thank you for choosing Jamie Broderick MD at [facility]  To Do: Hortencia Eagle  1. Please take meds as directed. [facility] is located in [address]. Monday, Tuesday & Friday – 8 a.m. to 4 p.m.   Wednesday, Thursday – 7 a.m. to 3 outweigh those potential risks and we strive to make you healthier and to improve your quality of life.     Referrals, and Radiology Information:    If your insurance requires a referral to a specialist, please allow 5 business days to process your referral

## 2020-07-20 ENCOUNTER — TELEPHONE (OUTPATIENT)
Dept: FAMILY MEDICINE CLINIC | Facility: CLINIC | Age: 50
End: 2020-07-20

## 2020-07-20 DIAGNOSIS — L50.9 HIVES: ICD-10-CM

## 2020-07-20 DIAGNOSIS — G89.29 OTHER CHRONIC PAIN: Primary | ICD-10-CM

## 2020-07-20 NOTE — TELEPHONE ENCOUNTER
Patient states she saw her allergist, would like to have labs done for autoimmune disease, please advise.

## 2020-07-21 NOTE — TELEPHONE ENCOUNTER
I don't have access to the autoimmune panel so if anything needs to be added let me know. Do you want to add thyroid labs too? She hasn't had her thyroid tested since 9/24/19.

## 2020-07-22 ENCOUNTER — TELEPHONE (OUTPATIENT)
Dept: FAMILY MEDICINE CLINIC | Facility: CLINIC | Age: 50
End: 2020-07-22

## 2020-07-22 NOTE — TELEPHONE ENCOUNTER
Future Appointments   Date Time Provider Miguel Wendy   7/23/2020  1:30 PM Mart Fontaine MD [facility] [address]   10/21/2020  1:30 PM Merritt Sanchez MD [address]     Patient verbally consents to a virtual/telephone check-in service f

## 2020-07-22 NOTE — TELEPHONE ENCOUNTER
Edgardo Belcher Pt is calling to let you know she is going to the [facility] due to allergic reaction. Hives, BP running high and bottom lip is swollen.

## 2020-07-23 ENCOUNTER — VIRTUAL PHONE E/M (OUTPATIENT)
Dept: FAMILY MEDICINE CLINIC | Facility: CLINIC | Age: 50
End: 2020-07-23
Payer: COMMERCIAL

## 2020-07-23 DIAGNOSIS — G89.18 POST-OP PAIN: ICD-10-CM

## 2020-07-23 DIAGNOSIS — L50.9 HIVES: Primary | ICD-10-CM

## 2020-07-23 DIAGNOSIS — F41.9 ANXIETY: ICD-10-CM

## 2020-07-23 PROCEDURE — 99441 PHONE E/M BY PHYS 5-10 MIN: CPT | Performed by: FAMILY MEDICINE

## 2020-07-23 RX ORDER — LORAZEPAM 1 MG/1
1 TABLET ORAL 2 TIMES DAILY PRN
Qty: 30 TABLET | Refills: 0 | Status: SHIPPED | OUTPATIENT
Start: 2020-07-23 | End: 2020-08-12

## 2020-07-23 RX ORDER — HYDROCODONE BITARTRATE AND ACETAMINOPHEN 10; 325 MG/1; MG/1
1-2 TABLET ORAL
Qty: 120 TABLET | Refills: 0 | Status: SHIPPED | OUTPATIENT
Start: 2020-07-26 | End: 2020-08-05

## 2020-07-23 NOTE — PROGRESS NOTES
HPI:    Patient ID: Eunice Gaston is a 51year old female. HPI  This is a 56-year-old female with known history of chronic pain secondary to lumbar degenerative joint disease who recently underwent pelvic surgery.   She has had postop pain since th as needed for Anxiety. 30 tablet 0   • traMADol HCl 50 MG Oral Tab Take 1-2 tablets ( mg total) by mouth every 6 (six) hours as needed for Pain.  120 tablet 0   • ibuprofen 600 MG Oral Tab Take 1 tablet (600 mg total) by mouth every 6 (six) hours as n herself off to 1 pill every 6 hours as she has been doing before. I will give her the benefit of the doubt at this point.   Anxiety  -Possibly secondary to prednisone use; will give Lorazepam 1 mg twice a day in the interim  Hives  (primary encounter diagn

## 2020-07-23 NOTE — PROGRESS NOTES
Virtual Telephone Check-In    Griffithvillecara Hargrove verbally consents to a Virtual/Telephone Check-In visit on 07/23/20. Patient has been referred to the [facility] website at [email or web address]/consents to review the yearly Consent to Treat document.     Patient und

## 2020-07-23 NOTE — PATIENT INSTRUCTIONS
Thank you for choosing Jennifer MD Dmitriy at [place] [address]  To Do: Leopoldo Hargrove  1. Please take meds as directed. [facility] is located in [address]. Monday, Tuesday & Friday – 8 a.m. to 4 p.m.   Wednesday, Thursday – 7 a.m. to 3 outweigh those potential risks and we strive to make you healthier and to improve your quality of life.     Referrals, and Radiology Information:    If your insurance requires a referral to a specialist, please allow 5 business days to process your referral

## 2020-07-28 ENCOUNTER — VIRTUAL PHONE E/M (OUTPATIENT)
Dept: FAMILY MEDICINE CLINIC | Facility: CLINIC | Age: 50
End: 2020-07-28
Payer: COMMERCIAL

## 2020-07-28 DIAGNOSIS — T78.3XXD ANGIOEDEMA, SUBSEQUENT ENCOUNTER: ICD-10-CM

## 2020-07-28 DIAGNOSIS — L50.9 HIVES: Primary | ICD-10-CM

## 2020-07-28 PROCEDURE — 99441 PHONE E/M BY PHYS 5-10 MIN: CPT | Performed by: FAMILY MEDICINE

## 2020-07-28 RX ORDER — MUPIROCIN CALCIUM 20 MG/G
1 CREAM TOPICAL DAILY
Qty: 30 G | Refills: 0 | Status: SHIPPED | OUTPATIENT
Start: 2020-07-28 | End: 2021-05-25

## 2020-07-28 RX ORDER — ALBUTEROL SULFATE 90 UG/1
2 AEROSOL, METERED RESPIRATORY (INHALATION) EVERY 6 HOURS PRN
Qty: 8.5 INHALER | Refills: 2 | Status: SHIPPED | OUTPATIENT
Start: 2020-07-28 | End: 2021-01-19

## 2020-07-28 NOTE — PATIENT INSTRUCTIONS
Thank you for choosing Carlos Casey MD at [place] [address]  To Do: Melinda Mensah  1. Please take meds as directed. [facility] is located in [address]. Monday, Tuesday & Friday – 8 a.m. to 4 p.m.   Wednesday, Thursday – 7 a.m. to 3 outweigh those potential risks and we strive to make you healthier and to improve your quality of life.     Referrals, and Radiology Information:    If your insurance requires a referral to a specialist, please allow 5 business days to process your referral

## 2020-07-28 NOTE — PROGRESS NOTES
HPI:    Patient ID: Noris Orozco is a 51year old female. HPI  Ms. Ar White is a pleasant 52-year-old female with known history of chronic pain secondary to lumbar degenerative joint disease, status post pelvic surgery, recurrent hives presentin Anxiety. 30 tablet 0   • traMADol HCl 50 MG Oral Tab Take 1-2 tablets ( mg total) by mouth every 6 (six) hours as needed for Pain.  120 tablet 0   • ibuprofen 600 MG Oral Tab Take 1 tablet (600 mg total) by mouth every 6 (six) hours as needed for Pain in this encounter.       Meds This Visit:  Requested Prescriptions     Signed Prescriptions Disp Refills   • Albuterol Sulfate  (90 Base) MCG/ACT Inhalation Aero Soln 8.5 Inhaler 2     Sig: Inhale 2 puffs into the lungs every 6 (six) hours as needed

## 2020-07-28 NOTE — PROGRESS NOTES
Virtual Telephone Check-In    Dylon Young verbally consents to a Virtual/Telephone Check-In visit on 07/28/20. Patient has been referred to the [facility] website at [email or web address]/consents to review the yearly Consent to Treat document.     Patient und

## 2020-07-30 ENCOUNTER — TELEPHONE (OUTPATIENT)
Dept: UROLOGY | Facility: HOSPITAL | Age: 50
End: 2020-07-30

## 2020-07-30 DIAGNOSIS — R82.90 CLOUDY URINE: Primary | ICD-10-CM

## 2020-07-30 DIAGNOSIS — R39.15 URGENCY OF URINATION: ICD-10-CM

## 2020-07-30 NOTE — TELEPHONE ENCOUNTER
Patient called, states felt a pulling sensation and then a \"pop\" in her vagina a few days ago, noticed some bright red blood with wiping only 2 times, now states she feels a bulge in the vagina, she is requesting an apt with Dr. Monty Loomis, denies any pain

## 2020-08-03 RX ORDER — TRAMADOL HYDROCHLORIDE 50 MG/1
TABLET ORAL EVERY 6 HOURS PRN
Qty: 120 TABLET | Refills: 0 | Status: SHIPPED | OUTPATIENT
Start: 2020-08-03 | End: 2020-08-13

## 2020-08-05 ENCOUNTER — TELEPHONE (OUTPATIENT)
Dept: UROLOGY | Facility: HOSPITAL | Age: 50
End: 2020-08-05

## 2020-08-05 RX ORDER — HYDROCODONE BITARTRATE AND ACETAMINOPHEN 10; 325 MG/1; MG/1
1-2 TABLET ORAL
Qty: 120 TABLET | Refills: 0 | Status: SHIPPED | OUTPATIENT
Start: 2020-08-05 | End: 2020-08-05

## 2020-08-05 RX ORDER — HYDROCODONE BITARTRATE AND ACETAMINOPHEN 10; 325 MG/1; MG/1
1-2 TABLET ORAL
Qty: 120 TABLET | Refills: 0 | Status: SHIPPED | OUTPATIENT
Start: 2020-08-05 | End: 2020-08-13

## 2020-08-05 NOTE — TELEPHONE ENCOUNTER
Called patient, she canceled her apt with Dr. Tima Sarmiento today, also a urine was ordered last week but cannot find the result in Care Everywhere, asked if she had the culture, states she just dropped it off today, [place] [facility] [place]

## 2020-08-05 NOTE — TELEPHONE ENCOUNTER
Requesting Norco 10/325mg  LOV: 7/28/2020 acute TV visit  RTC: prn  Last Relevant Labs: 9/24/19  Filled: 7/26/2020 #120 with 0 refills    Future Appointments   Date Time Provider Miguel Nguyen   10/21/2020  1:30 PM MD Samantha Johnson

## 2020-08-10 ENCOUNTER — TELEPHONE (OUTPATIENT)
Dept: FAMILY MEDICINE CLINIC | Facility: CLINIC | Age: 50
End: 2020-08-10

## 2020-08-10 NOTE — TELEPHONE ENCOUNTER
Future Appointments   Date Time Provider Miguel Wendy   8/13/2020 10:00 AM Lavinia Arreguin MD [facility] [address]   10/21/2020  1:30 PM Kylie Warner MD [address]     Patient verbally consents to a virtual/telephone check-in service f

## 2020-08-11 ENCOUNTER — TELEPHONE (OUTPATIENT)
Dept: FAMILY MEDICINE CLINIC | Facility: CLINIC | Age: 50
End: 2020-08-11

## 2020-08-12 RX ORDER — LORAZEPAM 1 MG/1
1 TABLET ORAL 2 TIMES DAILY PRN
Qty: 30 TABLET | Refills: 0 | Status: SHIPPED | OUTPATIENT
Start: 2020-08-12 | End: 2020-08-26

## 2020-08-12 NOTE — TELEPHONE ENCOUNTER
Requesting LORAZEPAM 1 MG   LOV: 7/28/20  RTC:   Last Relevant Labs: lab orders pending  Filled: 7/23/20 #30 with 0 refills    Future Appointments   Date Time Provider Miguel Wendy   8/13/2020 10:00 AM Dearl Simmonds, MD [facility] [address]   10/21/2

## 2020-08-13 ENCOUNTER — VIRTUAL PHONE E/M (OUTPATIENT)
Dept: FAMILY MEDICINE CLINIC | Facility: CLINIC | Age: 50
End: 2020-08-13
Payer: COMMERCIAL

## 2020-08-13 ENCOUNTER — TELEPHONE (OUTPATIENT)
Dept: FAMILY MEDICINE CLINIC | Facility: CLINIC | Age: 50
End: 2020-08-13

## 2020-08-13 DIAGNOSIS — T78.40XD ALLERGY, SUBSEQUENT ENCOUNTER: ICD-10-CM

## 2020-08-13 DIAGNOSIS — G89.4 CHRONIC PAIN SYNDROME: ICD-10-CM

## 2020-08-13 DIAGNOSIS — T78.3XXD ANGIOEDEMA, SUBSEQUENT ENCOUNTER: Primary | ICD-10-CM

## 2020-08-13 PROCEDURE — 99441 PHONE E/M BY PHYS 5-10 MIN: CPT | Performed by: FAMILY MEDICINE

## 2020-08-13 RX ORDER — TRAMADOL HYDROCHLORIDE 50 MG/1
TABLET ORAL EVERY 6 HOURS PRN
Qty: 120 TABLET | Refills: 0 | Status: SHIPPED | OUTPATIENT
Start: 2020-08-13 | End: 2020-08-26

## 2020-08-13 RX ORDER — HYDROCODONE BITARTRATE AND ACETAMINOPHEN 10; 325 MG/1; MG/1
1-2 TABLET ORAL
Qty: 120 TABLET | Refills: 0 | Status: SHIPPED | OUTPATIENT
Start: 2020-08-13 | End: 2020-08-13

## 2020-08-13 RX ORDER — HYDROCODONE BITARTRATE AND ACETAMINOPHEN 10; 325 MG/1; MG/1
1-2 TABLET ORAL
Qty: 120 TABLET | Refills: 0 | Status: SHIPPED | OUTPATIENT
Start: 2020-08-13 | End: 2020-08-21

## 2020-08-13 RX ORDER — TRAMADOL HYDROCHLORIDE 50 MG/1
TABLET ORAL EVERY 6 HOURS PRN
Qty: 120 TABLET | Refills: 0 | Status: SHIPPED | OUTPATIENT
Start: 2020-08-13 | End: 2020-08-13

## 2020-08-13 NOTE — TELEPHONE ENCOUNTER
Spoke to pt, states RX's were sent to the wrong pharmacy. Tramadol should have been sent to [address] should have been sent to [place]. Pt states [address] is a small pharmacy and won't fill her [place].    RX's repended to correct pharmacy.  Pl

## 2020-08-13 NOTE — PROGRESS NOTES
HPI:    Patient ID: Jen Shelby is a 51year old female. HPI  Ms. Edgardo Rivera is a 32-year-old female presenting for a phone visit follow-up.   She currently lives in [facility] and has been working with an allergist for a few years now for ongoing a Inhalation Aero Soln Inhale 2 puffs into the lungs every 6 (six) hours as needed for Wheezing. 8.5 Inhaler 2   • ibuprofen 600 MG Oral Tab Take 1 tablet (600 mg total) by mouth every 6 (six) hours as needed for Pain.  30 tablet 1   • psyllium 28 % Oral Powd will oblige for now but hopefully will taper off these meds in the near future. If she develops respiratory distress, I did tell her to go to the emergency room immediately. She verbalized understanding and agreement of plan of care.   No orders of the

## 2020-08-13 NOTE — PATIENT INSTRUCTIONS
Thank you for choosing Naga Hawkins MD at [place] [address]  To Do: [facility]  1. Please set up appt with allergist, Dr. Elda [facility] is located in [address]. Monday, Tuesday & Friday – 8 a.m. to 4 p.m.   Jaquita Collet intention is that the benefits outweigh those potential risks and we strive to make you healthier and to improve your quality of life.     Referrals, and Radiology Information:    If your insurance requires a referral to a specialist, please allow 5 busines

## 2020-08-13 NOTE — PROGRESS NOTES
Virtual Telephone Check-In    Rocio Asher verbally consents to a Virtual/Telephone Check-In visit on 08/13/20. Patient has been referred to the [facility] website at [email or web address] to review the yearly Consent to Treat document.     Patient und

## 2020-08-13 NOTE — TELEPHONE ENCOUNTER
Pt calling in regards to her norco was sent to the wrong pharmacy again, she stated she had this discussion with doctor Bill Hendricks before in regards to where her certain  medication should be sent to , her norco has to be sent to     [place] [address] #12

## 2020-08-14 NOTE — TELEPHONE ENCOUNTER
Pt called to check the status of [place] paperwork. I let pt know that we were wanting to know if pt wanted it faxed (to which number) or to pick it up. Pt states it is still incomplete bc MD needs to add time frame & duration.      Paperwork placed in MD lopez

## 2020-08-21 RX ORDER — HYDROCODONE BITARTRATE AND ACETAMINOPHEN 10; 325 MG/1; MG/1
1-2 TABLET ORAL
Qty: 120 TABLET | Refills: 0 | Status: SHIPPED | OUTPATIENT
Start: 2020-08-21 | End: 2020-09-01

## 2020-08-21 NOTE — TELEPHONE ENCOUNTER
- Pt would like refill request.  Pt states she is starting to feel better.     [facility] #[id number] - [address] AT [address] & [address] [address] [facility], [phone], [phone]   Outpatient Medication Detail      Radha Sanabria

## 2020-08-21 NOTE — TELEPHONE ENCOUNTER
Dr. Teofilo Rcok- Pt is calling to follow up on FMLA. Pt states paperwork has to have a duration date. Date can be 6 months out and can be changed. Please advise.    [phone]

## 2020-08-21 NOTE — TELEPHONE ENCOUNTER
Requesting Norco 10/325mg  LOV: 8/13/2020  RTC: prn  Last Relevant Labs: 8/7/2020  Filled: 8/13/2020 #120 with 0 refills    Future Appointments   Date Time Provider Miguel Nguyen   10/21/2020  1:30 PM Marvin Nguyen MD [address]     Per

## 2020-08-26 RX ORDER — TRAMADOL HYDROCHLORIDE 50 MG/1
TABLET ORAL EVERY 6 HOURS PRN
Qty: 120 TABLET | Refills: 0 | Status: SHIPPED | OUTPATIENT
Start: 2020-08-26 | End: 2020-09-09

## 2020-08-26 RX ORDER — LORAZEPAM 1 MG/1
1 TABLET ORAL 2 TIMES DAILY PRN
Qty: 30 TABLET | Refills: 0 | Status: SHIPPED | OUTPATIENT
Start: 2020-08-26 | End: 2020-12-09

## 2020-08-26 NOTE — TELEPHONE ENCOUNTER
Requesting Tramadol 50mg  LOV: 8/13/2020  RTC: prn  Last Relevant Labs:   Filled: 8/13/2020 #120 with 0 refills    Requesting Lorazepam 1mg  LOV: 8/12/2020  RTC: prn  Last Relevant Labs:   Filled: 8/12/2020 #30 with 0 refills    Future Appointments   Date

## 2020-08-31 ENCOUNTER — TELEPHONE (OUTPATIENT)
Dept: FAMILY MEDICINE CLINIC | Facility: CLINIC | Age: 50
End: 2020-08-31

## 2020-08-31 NOTE — TELEPHONE ENCOUNTER
- Pt is calling to request refill.     [facility] #[id number] - [address] [place] [address], [phone], [phone]   Outpatient Medication Detail      Disp Refills Start End    HYDROcodone-acet

## 2020-09-01 ENCOUNTER — VIRTUAL PHONE E/M (OUTPATIENT)
Dept: FAMILY MEDICINE CLINIC | Facility: CLINIC | Age: 50
End: 2020-09-01
Payer: COMMERCIAL

## 2020-09-01 ENCOUNTER — MOBILE ENCOUNTER (OUTPATIENT)
Dept: FAMILY MEDICINE CLINIC | Facility: CLINIC | Age: 50
End: 2020-09-01

## 2020-09-01 DIAGNOSIS — M47.816 OSTEOARTHRITIS OF LUMBAR SPINE, UNSPECIFIED SPINAL OSTEOARTHRITIS COMPLICATION STATUS: Primary | ICD-10-CM

## 2020-09-01 PROCEDURE — 99443 PHONE E/M BY PHYS 21-30 MIN: CPT | Performed by: FAMILY MEDICINE

## 2020-09-01 RX ORDER — HYDROCODONE BITARTRATE AND ACETAMINOPHEN 10; 325 MG/1; MG/1
1-2 TABLET ORAL
Qty: 120 TABLET | Refills: 0 | Status: SHIPPED | OUTPATIENT
Start: 2020-09-01 | End: 2020-09-02

## 2020-09-01 NOTE — PATIENT INSTRUCTIONS
Thank you for choosing Argelia Cintron MD at [place] [address]  To Do: Wilfred Carolina  1. Please take meds as directed. [facility] is located in [address]. Monday, Tuesday & Friday – 8 a.m. to 4 p.m.   Wednesday, Thursday – 7 a.m. to 3 outweigh those potential risks and we strive to make you healthier and to improve your quality of life.     Referrals, and Radiology Information:    If your insurance requires a referral to a specialist, please allow 5 business days to process your referral

## 2020-09-01 NOTE — PROGRESS NOTES
Virtual Telephone Check-In    Eunice Jacques verbally consents to a Virtual/Telephone Check-In visit on 09/01/20. Patient has been referred to the [facility] website at [email or web address].[email or web address]/consents to review the yearly Consent to Treat document.     Patient und

## 2020-09-01 NOTE — PROGRESS NOTES
HPI:    Patient ID: Mariajose Stephenson is a 51year old female. HPI  Ms. Keila Mcgrath is a 45-year-old female presenting for a phone visit follow-up. She is calling for refill for [place] which has been helpful with controlling her ongoing pain syndrome. OR) Take 1 tablet by mouth as needed (Allergies). • TRAZODONE HCL 50 MG Oral Tab TAKE 1 TABLET BY MOUTH NIGHTLY AS NEEDED (Patient not taking: Reported on 6/2/2020) 30 tablet 1   • cetirizine 10 MG Oral Tab Take 10 mg by mouth daily as needed.

## 2020-09-02 ENCOUNTER — TELEPHONE (OUTPATIENT)
Dept: FAMILY MEDICINE CLINIC | Facility: CLINIC | Age: 50
End: 2020-09-02

## 2020-09-02 ENCOUNTER — VIRTUAL PHONE E/M (OUTPATIENT)
Dept: FAMILY MEDICINE CLINIC | Facility: CLINIC | Age: 50
End: 2020-09-02
Payer: COMMERCIAL

## 2020-09-02 DIAGNOSIS — M47.816 OSTEOARTHRITIS OF LUMBAR SPINE, UNSPECIFIED SPINAL OSTEOARTHRITIS COMPLICATION STATUS: ICD-10-CM

## 2020-09-02 DIAGNOSIS — G89.18 POST-OP PAIN: Primary | ICD-10-CM

## 2020-09-02 DIAGNOSIS — Z51.81 MEDICATION MONITORING ENCOUNTER: ICD-10-CM

## 2020-09-02 PROCEDURE — 99442 PHONE E/M BY PHYS 11-20 MIN: CPT | Performed by: FAMILY MEDICINE

## 2020-09-02 RX ORDER — OXYCODONE AND ACETAMINOPHEN 10; 325 MG/1; MG/1
1 TABLET ORAL
Qty: 120 TABLET | Refills: 0 | Status: SHIPPED | OUTPATIENT
Start: 2020-09-02 | End: 2020-09-29

## 2020-09-02 NOTE — PROGRESS NOTES
HPI:    Patient ID: Rocio Asher is a 51year old female. HPI  Ms. Amanda Rojas is a 42-year-old female presenting for a phone visit follow-up.   She has had chronic pain secondary to lumbar degenerative joint disease and arthritis for the last sever Albuterol Sulfate  (90 Base) MCG/ACT Inhalation Aero Soln Inhale 2 puffs into the lungs every 6 (six) hours as needed for Wheezing. 8.5 Inhaler 2   • ibuprofen 600 MG Oral Tab Take 1 tablet (600 mg total) by mouth every 6 (six) hours as needed for P the allergist tomorrow for what sounded to be mast cell syndrome giving her more pain. I am not certain how this would incite pain but it is possible as this is inflammatory.   However I did tell her that I recognize that she has been taking more than she

## 2020-09-02 NOTE — TELEPHONE ENCOUNTER
Future Appointments   Date Time Provider Miguel Wendy   9/2/2020 11:15 AM Maribell Esqueda MD [facility] [address]     Patient verbally consents to a virtual/telephone check-in service for the date and time noted above.  Patient understands and accepts

## 2020-09-02 NOTE — TELEPHONE ENCOUNTER
Reviewed Dr. Julio Purcell plan to wean pt from percocet. May refill today but 120 tablets is to be a 30 day supply and pt and Dr. Julio Purcell will continue to wean monthly. Pharmacist verbalized understanding, prescription will be filled.

## 2020-09-02 NOTE — TELEPHONE ENCOUNTER
HYDROcodone-acetaminophen (NORCO)  MG Oral Tab    [facility] #[id number] - [address] [facility] AT [address] [place] [address] Ave & , [phone], [phone]  _____________________________________    Questions for clinical staff.

## 2020-09-02 NOTE — PROGRESS NOTES
Virtual Telephone Check-In    Loan Salgado verbally consents to a Virtual/Telephone Check-In visit on 09/02/20. Patient has been referred to the [facility] website at www.[email or web address]/consents to review the yearly Consent to Treat document.     Patient und

## 2020-09-09 ENCOUNTER — VIRTUAL PHONE E/M (OUTPATIENT)
Dept: FAMILY MEDICINE CLINIC | Facility: CLINIC | Age: 50
End: 2020-09-09
Payer: COMMERCIAL

## 2020-09-09 DIAGNOSIS — G89.18 POST-OP PAIN: ICD-10-CM

## 2020-09-09 DIAGNOSIS — G89.29 OTHER CHRONIC PAIN: Primary | ICD-10-CM

## 2020-09-09 PROCEDURE — 99214 OFFICE O/P EST MOD 30 MIN: CPT | Performed by: FAMILY MEDICINE

## 2020-09-09 RX ORDER — TRAMADOL HYDROCHLORIDE 50 MG/1
TABLET ORAL
Qty: 120 TABLET | Refills: 0 | Status: SHIPPED | OUTPATIENT
Start: 2020-09-09 | End: 2020-09-17

## 2020-09-09 NOTE — PROGRESS NOTES
Virtual Telephone Check-In    Mk Duval verbally consents to a Virtual/Telephone Check-In visit on 09/09/20. Patient has been referred to the [facility] website at www.[email or web address]/consents to review the yearly Consent to Treat document.     Patient und

## 2020-09-09 NOTE — PROGRESS NOTES
HPI:    Patient ID: Eunice Jacques is a 51year old female. HPI  Ms. Margarita Gonzalez is a 45-year-old female presenting for a phone visit follow-up.   She has had chronic pain secondary to lumbar degenerative joint disease and arthritis for the last sever for Pain. 30 tablet 1   • psyllium 28 % Oral Powd Pack Take 1 packet by mouth as needed. • PEG 3350 Oral Powd Pack Take 17 g by mouth daily as needed. • cyclobenzaprine 10 MG Oral Tab Take 1 tablet (10 mg total) by mouth nightly as needed.  30 table have had problems with this. I had asked her to send papers again and I will do my best to remedy this. No orders of the defined types were placed in this encounter.       Meds This Visit:  Requested Prescriptions     Signed Prescriptions Disp Refills   •

## 2020-09-09 NOTE — PATIENT INSTRUCTIONS
Thank you for choosing Ashley Barclay MD at [place] [address]  To Do: Corby Medrano  1. Please take meds as directed. [facility] is located in [address]. Monday, Tuesday & Friday – 8 a.m. to 4 p.m.   Wednesday, Thursday – 7 a.m. to 3 outweigh those potential risks and we strive to make you healthier and to improve your quality of life.     Referrals, and Radiology Information:    If your insurance requires a referral to a specialist, please allow 5 business days to process your referral

## 2020-09-16 ENCOUNTER — TELEPHONE (OUTPATIENT)
Dept: FAMILY MEDICINE CLINIC | Facility: CLINIC | Age: 50
End: 2020-09-16

## 2020-09-16 RX ORDER — TRAMADOL HYDROCHLORIDE 50 MG/1
TABLET ORAL
Qty: 120 TABLET | Refills: 0 | OUTPATIENT
Start: 2020-09-16

## 2020-09-16 NOTE — TELEPHONE ENCOUNTER
Name from pharmacy: TRAMADOL 50 MG TABLET 50MG         Will file in chart as: TRAMADOL HCL 50 MG Oral Tab         Sig: Take 1-2 tablets ( mg total) by mouth every 4 to 6 hours as needed for Pain.     Disp:  120 tablet (Pharmacy requested: [address]

## 2020-09-16 NOTE — TELEPHONE ENCOUNTER
Future Appointments   Date Time Provider Miguel Wendy   9/17/2020  9:15 AM Raimundo Vasquez MD [facility] [address]   10/21/2020  1:30 PM Theresa Alcala MD [address]     Patient verbally consents to a virtual/telephone check-in service f

## 2020-09-17 ENCOUNTER — VIRTUAL PHONE E/M (OUTPATIENT)
Dept: FAMILY MEDICINE CLINIC | Facility: CLINIC | Age: 50
End: 2020-09-17
Payer: COMMERCIAL

## 2020-09-17 DIAGNOSIS — G89.29 OTHER CHRONIC PAIN: Primary | ICD-10-CM

## 2020-09-17 PROCEDURE — 99441 PHONE E/M BY PHYS 5-10 MIN: CPT | Performed by: FAMILY MEDICINE

## 2020-09-17 RX ORDER — TRAMADOL HYDROCHLORIDE 50 MG/1
TABLET ORAL
Qty: 120 TABLET | Refills: 0 | Status: SHIPPED | OUTPATIENT
Start: 2020-09-17 | End: 2020-09-29

## 2020-09-17 NOTE — PROGRESS NOTES
Virtual Telephone Check-In    Mariajose Stephenson verbally consents to a Virtual/Telephone Check-In visit on 09/17/20. Patient has been referred to the [facility] website at www.[facility].[email or web address]/consents to review the yearly Consent to Treat document.     Patient und

## 2020-09-17 NOTE — PROGRESS NOTES
HPI:    Patient ID: Rocio Asher is a 53year old female. HPI  Ms. Amanda Rojas is a 30-year-old female with here for chronic pain who recently had pelvic surgery at [facility] presenting for phone visit for follow-up for [facility] papers we discuss Pain. 30 tablet 1   • psyllium 28 % Oral Powd Pack Take 1 packet by mouth as needed. • PEG 3350 Oral Powd Pack Take 17 g by mouth daily as needed. • cyclobenzaprine 10 MG Oral Tab Take 1 tablet (10 mg total) by mouth nightly as needed.  30 tablet 5 traMADol HCl 50 MG Oral Tab 120 tablet 0     Sig: Take 1-2 tablets ( mg total) by mouth every 4 to 6 hours as needed for Pain.        Imaging & Referrals:  None       PM#[id number]

## 2020-09-17 NOTE — PATIENT INSTRUCTIONS
Thank you for choosing Da Prakash MD at [place] [address]  To Do: Mark Haro  1. Please take meds as directed. Carlos Parks is located in [address]. Monday, Tuesday & Friday – 8 a.m. to 4 p.m.   Wednesday, Thursday – 7 a.m. to 3 outweigh those potential risks and we strive to make you healthier and to improve your quality of life.     Referrals, and Radiology Information:    If your insurance requires a referral to a specialist, please allow 5 business days to process your referral

## 2020-09-22 ENCOUNTER — TELEPHONE (OUTPATIENT)
Dept: FAMILY MEDICINE CLINIC | Facility: CLINIC | Age: 50
End: 2020-09-22

## 2020-09-22 NOTE — TELEPHONE ENCOUNTER
Pt states her  was contacted regardng his FMLA paperwork again. Pt asking what date for return to work is documented on paperwork, informed pt that the end date for the FMLA is written as ongoing.  Pt states she believes they need an actual end date

## 2020-09-22 NOTE — TELEPHONE ENCOUNTER
Patient would like to know what exactly was sent to her employer because they don't want to honor her FMLA, would like to speak to a nurse. Please advise.

## 2020-09-23 NOTE — TELEPHONE ENCOUNTER
Faxed updated [facility] ppw to [phone] (confirmed fax number with Katie Washington at [place] [address]). Recd confirmation. Copy sent to scan and copy placed in accordion folder.

## 2020-09-28 ENCOUNTER — TELEPHONE (OUTPATIENT)
Dept: FAMILY MEDICINE CLINIC | Facility: CLINIC | Age: 50
End: 2020-09-28

## 2020-09-28 RX ORDER — TRAMADOL HYDROCHLORIDE 50 MG/1
TABLET ORAL
Qty: 120 TABLET | Refills: 0 | OUTPATIENT
Start: 2020-09-28

## 2020-09-28 NOTE — TELEPHONE ENCOUNTER
Question 4B was amended with an end date but 4A still reads ongoing. Pt states Ilona Shall will accept the [facility] paperwork if it includes end dates.

## 2020-09-28 NOTE — TELEPHONE ENCOUNTER
Requesting Tramadol   LOV: 9/17/20  RTC:   Last Relevant Labs:   Filled: 9/17/20  #120 with 0 refills    Future Appointments   Date Time Provider Miguel Nguyen   10/21/2020  1:30 PM Colin Mckee MD [address]     TRAMADOL HCL 09/17/2020

## 2020-09-28 NOTE — TELEPHONE ENCOUNTER
Paperwork left in Dr. Kyle Coppola signature bin to amend the leave date on question 4A, forms then need to be faxed back.

## 2020-09-28 NOTE — TELEPHONE ENCOUNTER
Pt calling regarding updated [facility] paperwork, pt states that they rejected the updated forms from last week. The end date cannot say continous, there has to be an end date, month, date, year.   The date can be up to 6 months out and at the end date [facility]

## 2020-09-29 RX ORDER — TRAMADOL HYDROCHLORIDE 50 MG/1
TABLET ORAL
Qty: 120 TABLET | Refills: 0 | Status: CANCELLED | OUTPATIENT
Start: 2020-09-29

## 2020-09-29 RX ORDER — OXYCODONE AND ACETAMINOPHEN 10; 325 MG/1; MG/1
1 TABLET ORAL
Qty: 90 TABLET | Refills: 0 | Status: SHIPPED | OUTPATIENT
Start: 2020-10-02 | End: 2020-10-28

## 2020-09-29 RX ORDER — TRAMADOL HYDROCHLORIDE 50 MG/1
TABLET ORAL
Qty: 120 TABLET | Refills: 0 | Status: SHIPPED | OUTPATIENT
Start: 2020-09-29 | End: 2020-10-08

## 2020-09-29 NOTE — TELEPHONE ENCOUNTER
Called pt to notify the Dr. Real Hutchins will refill her Tramadol, pt verbalizes understanding. Pt states she will be due for a refill on her percocet on Friday but she only needs 90 pills rather than 120 pills, she is working on cutting back.

## 2020-09-29 NOTE — TELEPHONE ENCOUNTER
- Pt is requesting a refill for Pain medication. Pt is calling as she rec'd a Denial thru mychart.   Please call [facility] [phone], [phone]   Outpatient Medication Detail

## 2020-09-29 NOTE — TELEPHONE ENCOUNTER
See TE, please advise. Rx denial states that refill requested too soon but this could be a 10 day supply if she is taking 2 tramadol q12h per sig.

## 2020-10-08 RX ORDER — TRAMADOL HYDROCHLORIDE 50 MG/1
TABLET ORAL
Qty: 120 TABLET | Refills: 0 | Status: SHIPPED | OUTPATIENT
Start: 2020-10-08 | End: 2020-10-19

## 2020-10-08 RX ORDER — TRAMADOL HYDROCHLORIDE 50 MG/1
TABLET ORAL
Qty: 120 TABLET | Refills: 0 | OUTPATIENT
Start: 2020-10-08

## 2020-10-08 NOTE — TELEPHONE ENCOUNTER
Requesting Tramadol 50mg  LOV: 9/17/2020 Telephone Visit  RTC: prn  Last Relevant Labs: 9/24/19  Filled: 9/29/2020 #120 with 0 refills    Future Appointments   Date Time Provider Miguel Nguyen   10/21/2020  1:30 PM MD Vandana Toribio

## 2020-10-08 NOTE — TELEPHONE ENCOUNTER
Ling Parikh Pt is requesting a refill for   [facility], [address] [place] [address], [phone]   Outpatient Medication Detail     Disp Refills Start End    traMADol HCl 50 MG Oral Tab 120 tablet 0 9/29/2020     Sig - Route

## 2020-10-12 NOTE — LETTER
Date: 1/9/2020    Patient Name: Paul Matthews          To Whom it may concern: This letter has been written at the patient's request. The above patient was seen at the [facility] for treatment of a medical condition.     This patient sh no

## 2020-10-16 ENCOUNTER — TELEPHONE (OUTPATIENT)
Dept: FAMILY MEDICINE CLINIC | Facility: CLINIC | Age: 50
End: 2020-10-16

## 2020-10-16 RX ORDER — TRAMADOL HYDROCHLORIDE 50 MG/1
TABLET ORAL
Qty: 120 TABLET | Refills: 0 | OUTPATIENT
Start: 2020-10-16

## 2020-10-16 NOTE — TELEPHONE ENCOUNTER
Name from pharmacy: TRAMADOL 50 MG TABLET 50MG          Will file in chart as: TRAMADOL HCL 50 MG Oral Tab     Possible duplicate: David to review recent actions on this medication    Sig: Take 1-2 tablets ( mg total) by mouth every 4 to 6 hours as n

## 2020-10-19 ENCOUNTER — TELEPHONE (OUTPATIENT)
Dept: UROLOGY | Facility: HOSPITAL | Age: 50
End: 2020-10-19

## 2020-10-19 ENCOUNTER — TELEMEDICINE (OUTPATIENT)
Dept: FAMILY MEDICINE CLINIC | Facility: CLINIC | Age: 50
End: 2020-10-19
Payer: COMMERCIAL

## 2020-10-19 DIAGNOSIS — G89.4 CHRONIC PAIN SYNDROME: Primary | ICD-10-CM

## 2020-10-19 PROCEDURE — 99213 OFFICE O/P EST LOW 20 MIN: CPT | Performed by: FAMILY MEDICINE

## 2020-10-19 RX ORDER — TRAMADOL HYDROCHLORIDE 50 MG/1
TABLET ORAL
Qty: 120 TABLET | Refills: 0 | Status: SHIPPED | OUTPATIENT
Start: 2020-10-19 | End: 2020-10-28

## 2020-10-19 RX ORDER — TRAMADOL HYDROCHLORIDE 50 MG/1
TABLET ORAL
Qty: 120 TABLET | Refills: 0 | OUTPATIENT
Start: 2020-10-19

## 2020-10-19 NOTE — TELEPHONE ENCOUNTER
Requesting Tramadol 50mg  LOV: 9/17/2020  RTC: prn  Last Relevant Labs: 9/24/19  Filled: 10/8/2020 #120 with 0 refills    Future Appointments   Date Time Provider Miguel Nguyen   10/21/2020  1:30 PM Kylie Warner MD [address]     [facility]

## 2020-10-19 NOTE — TELEPHONE ENCOUNTER
Tramadol refill, 2nd request, if she runs out she will go through withdrawal, could go into hives, will have to see a specialist in [place] [address], needs it today, one was denied on Friday, she has to call every 10 days, please advise,   Will call again at [facility]

## 2020-10-19 NOTE — TELEPHONE ENCOUNTER
Called patient regarding upcoming apt with Dr. Tima Sarmiento, asked if she did the PT as recommended by Dr. Tima Sarmiento, patient states she has had multiple apts with [address] [facility] specialist and is on Prednisone, she was told she should limit outside exposu

## 2020-10-19 NOTE — PATIENT INSTRUCTIONS
Thank you for choosing Monica Liu MD at [place] [address]  To Do: Chelsy Vázquez  1. Please take meds as directed. Carlos Henderson is located in [address]. Monday, Tuesday & Friday – 8 a.m. to 4 p.m.   Wednesday, Thursday – 7 a.m. to 3 outweigh those potential risks and we strive to make you healthier and to improve your quality of life.     Referrals, and Radiology Information:    If your insurance requires a referral to a specialist, please allow 5 business days to process your referral

## 2020-10-19 NOTE — PROGRESS NOTES
HPI:    Patient ID: Corby Medrano is a 53year old female. HPI  Ms. 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   She is presenting f hours as needed for Pain. 30 tablet 1   • psyllium 28 % Oral Powd Pack Take 1 packet by mouth as needed. • PEG 3350 Oral Powd Pack Take 17 g by mouth daily as needed.      • cyclobenzaprine 10 MG Oral Tab Take 1 tablet (10 mg total) by mouth nightly as every 4 to 6 hours as needed for Pain.        Imaging & Referrals:  None       SK#[id number]

## 2020-10-20 RX ORDER — TRAMADOL HYDROCHLORIDE 50 MG/1
TABLET ORAL
Qty: 120 TABLET | Refills: 0 | OUTPATIENT
Start: 2020-10-20

## 2020-10-28 ENCOUNTER — TELEPHONE (OUTPATIENT)
Dept: FAMILY MEDICINE CLINIC | Facility: CLINIC | Age: 50
End: 2020-10-28

## 2020-10-28 DIAGNOSIS — T78.3XXD ANGIOEDEMA, SUBSEQUENT ENCOUNTER: Primary | ICD-10-CM

## 2020-10-28 DIAGNOSIS — L50.9 HIVES: ICD-10-CM

## 2020-10-28 RX ORDER — OXYCODONE AND ACETAMINOPHEN 10; 325 MG/1; MG/1
1 TABLET ORAL
Qty: 90 TABLET | Refills: 0 | Status: SHIPPED | OUTPATIENT
Start: 2020-10-28 | End: 2020-11-23

## 2020-10-28 RX ORDER — TRAMADOL HYDROCHLORIDE 50 MG/1
TABLET ORAL
Qty: 120 TABLET | Refills: 0 | Status: SHIPPED | OUTPATIENT
Start: 2020-10-28 | End: 2020-10-29

## 2020-10-28 RX ORDER — TRAMADOL HYDROCHLORIDE 50 MG/1
TABLET ORAL
Qty: 120 TABLET | Refills: 0 | OUTPATIENT
Start: 2020-10-28

## 2020-10-28 NOTE — TELEPHONE ENCOUNTER
Pt needs a dermatology referral, she is looking for someone closer to the [facility] area. She needs a biopsy of her hives per Dr. Arti Sheffield. Pt states she is having angioedema and has been on steroids for months.   Pt would like to let Dr. Adelia Judge

## 2020-10-28 NOTE — TELEPHONE ENCOUNTER
See TE, pt needs derm referral for a derm in the [facility] area, Dr. Mariangel Segura will be sending you information regarding that.  Pt also asking about tramadol refill, states is was discussed at her [facility] 10/19, please advise as it was denied earlier tod

## 2020-10-28 NOTE — TELEPHONE ENCOUNTER
LM providing referral information and informing pt that medication refills were sent, asked pt to call back with any questions or concerns.

## 2020-10-28 NOTE — TELEPHONE ENCOUNTER
Pt states that her Tramadol is due tomorrow and that she only gets 10 day supply at a time. Please send to [facility]. She is unable to schedule her annual physical at this time,  as she is at [facility] seeing specialists, which she has discussed with Dr. Sam Barrios. Patient will also need a refill on the Percoset-which is due 11-2-2020, and needs to be sent to [facility].

## 2020-10-28 NOTE — TELEPHONE ENCOUNTER
- Pt needs a referral for a Biopsy on skin for the hives. Need Derm to do skin biopsy.        Dr. Gretchen Covarrubias with [facility] is sending over a fax request for referral.    Please salvador with any questions [phone]  Also please call when o

## 2020-10-29 ENCOUNTER — TELEPHONE (OUTPATIENT)
Dept: FAMILY MEDICINE CLINIC | Facility: CLINIC | Age: 50
End: 2020-10-29

## 2020-10-29 DIAGNOSIS — G89.29 OTHER CHRONIC PAIN: ICD-10-CM

## 2020-10-29 DIAGNOSIS — L50.9 HIVES: ICD-10-CM

## 2020-10-29 DIAGNOSIS — M47.816 OSTEOARTHRITIS OF LUMBAR SPINE, UNSPECIFIED SPINAL OSTEOARTHRITIS COMPLICATION STATUS: Primary | ICD-10-CM

## 2020-10-29 RX ORDER — TRAMADOL HYDROCHLORIDE 50 MG/1
TABLET ORAL
Qty: 120 TABLET | Refills: 0 | Status: SHIPPED | OUTPATIENT
Start: 2020-10-29 | End: 2020-11-05

## 2020-10-29 NOTE — TELEPHONE ENCOUNTER
traMADol HCl 50 MG Oral Tab    #360/per month and needs a diagnosis for the amount she is filling per month.     [address] [phone], [phone]

## 2020-11-05 DIAGNOSIS — G89.29 OTHER CHRONIC PAIN: ICD-10-CM

## 2020-11-05 DIAGNOSIS — L50.9 HIVES: ICD-10-CM

## 2020-11-05 DIAGNOSIS — M47.816 OSTEOARTHRITIS OF LUMBAR SPINE, UNSPECIFIED SPINAL OSTEOARTHRITIS COMPLICATION STATUS: ICD-10-CM

## 2020-11-05 RX ORDER — TRAMADOL HYDROCHLORIDE 50 MG/1
TABLET ORAL
Qty: 120 TABLET | Refills: 0 | Status: SHIPPED | OUTPATIENT
Start: 2020-11-05 | End: 2020-11-17

## 2020-11-05 RX ORDER — TRAMADOL HYDROCHLORIDE 50 MG/1
TABLET ORAL
Qty: 120 TABLET | Refills: 0 | OUTPATIENT
Start: 2020-11-05

## 2020-11-05 NOTE — TELEPHONE ENCOUNTER
Requesting Tramadol 50mg  LOV: 10/19/2020  RTC: prn  Last Relevant Labs:   Filled: 10/29/2020 #120 with 0 refills    No future appointments.     Request denied - Rx sent on 10/29/2020

## 2020-11-05 NOTE — TELEPHONE ENCOUNTER
Pt requesting refill on Tramadol 50mg. Last filled on 10/29/2020 #120, pt states this is a 10 day supply.     Rx pended and routed for approval/denial

## 2020-11-17 ENCOUNTER — TELEPHONE (OUTPATIENT)
Dept: FAMILY MEDICINE CLINIC | Facility: CLINIC | Age: 50
End: 2020-11-17

## 2020-11-17 DIAGNOSIS — L50.9 HIVES: ICD-10-CM

## 2020-11-17 DIAGNOSIS — G89.29 OTHER CHRONIC PAIN: ICD-10-CM

## 2020-11-17 DIAGNOSIS — M47.816 OSTEOARTHRITIS OF LUMBAR SPINE, UNSPECIFIED SPINAL OSTEOARTHRITIS COMPLICATION STATUS: ICD-10-CM

## 2020-11-17 RX ORDER — TRAMADOL HYDROCHLORIDE 50 MG/1
TABLET ORAL
Qty: 120 TABLET | Refills: 0 | Status: SHIPPED | OUTPATIENT
Start: 2020-11-17 | End: 2020-11-23

## 2020-11-17 NOTE — TELEPHONE ENCOUNTER
CARLY for pt informing her that the pharmacy called concerned about the amount of pain medication pt fills and Dr. Bee Harris is concerned as well.  Informed pt that Dr. Bee Harris is no longer comfortable managing pt's pain and he will now defer all pain medicati

## 2020-11-17 NOTE — TELEPHONE ENCOUNTER
I am going to agree with this. I don't think I will be able to manage her pain at this point. Please call her and tell her that I would recommend for her to work with the pain management doctor.

## 2020-11-17 NOTE — TELEPHONE ENCOUNTER
Severiano Race from [facility], she wanted to be sure Dr. Keyla Becerril was aware that pt is going through 120 Tramadol tablets every 10 days. Informed Severiano Race that we are aware.   Severiano Race asked for diagnoses to associate with the prescription; chronic pain, ost

## 2020-11-17 NOTE — TELEPHONE ENCOUNTER
I called Sissy Simeon on her phone at around 4:00 pm.  I emphasized to her that he would continue to wean her off her Percocet and hopefully tramadol soon. She is willing to do this.   I did explain to her further that while it is understandable that she has be

## 2020-11-23 ENCOUNTER — TELEMEDICINE (OUTPATIENT)
Dept: FAMILY MEDICINE CLINIC | Facility: CLINIC | Age: 50
End: 2020-11-23
Payer: COMMERCIAL

## 2020-11-23 ENCOUNTER — TELEPHONE (OUTPATIENT)
Dept: FAMILY MEDICINE CLINIC | Facility: CLINIC | Age: 50
End: 2020-11-23

## 2020-11-23 DIAGNOSIS — L50.9 HIVES: ICD-10-CM

## 2020-11-23 DIAGNOSIS — G89.4 CHRONIC PAIN SYNDROME: Primary | ICD-10-CM

## 2020-11-23 DIAGNOSIS — G89.29 OTHER CHRONIC PAIN: ICD-10-CM

## 2020-11-23 DIAGNOSIS — M47.816 OSTEOARTHRITIS OF LUMBAR SPINE, UNSPECIFIED SPINAL OSTEOARTHRITIS COMPLICATION STATUS: ICD-10-CM

## 2020-11-23 PROCEDURE — 99213 OFFICE O/P EST LOW 20 MIN: CPT | Performed by: FAMILY MEDICINE

## 2020-11-23 RX ORDER — OXYCODONE AND ACETAMINOPHEN 10; 325 MG/1; MG/1
TABLET ORAL EVERY 6 HOURS PRN
Qty: 60 TABLET | Refills: 0 | Status: SHIPPED | OUTPATIENT
Start: 2020-11-23 | End: 2020-11-23

## 2020-11-23 RX ORDER — TRAMADOL HYDROCHLORIDE 50 MG/1
TABLET ORAL
Qty: 240 TABLET | Refills: 0 | Status: SHIPPED | OUTPATIENT
Start: 2020-11-23 | End: 2020-12-22

## 2020-11-23 RX ORDER — OXYCODONE AND ACETAMINOPHEN 10; 325 MG/1; MG/1
TABLET ORAL EVERY 6 HOURS PRN
Qty: 60 TABLET | Refills: 0 | Status: SHIPPED | OUTPATIENT
Start: 2020-11-23 | End: 2020-12-23

## 2020-11-23 NOTE — TELEPHONE ENCOUNTER
Pt states that she is having trouble getting the [place] filled at the pharmacy. Pt needs the physician to ok the new script.

## 2020-11-23 NOTE — PROGRESS NOTES
HPI:    Patient ID: Dylan Cook is a 53year old female. HPI  Ms. Jose Darden is a pleasant 52-year-old female with chronic pain secondary to fibromyalgia and arthritis and postop pain. She is presenting for a video visit follow-up.   We had discu 30 tablet 1   • cetirizine 10 MG Oral Tab Take 10 mg by mouth daily as needed. • Multiple Vitamin (MULTI-VITAMIN DAILY) Oral Tab Take 1 tablet by mouth daily. • caffeine 200 MG Oral Tab Take 200 mg by mouth daily.        • EPINEPHrine (EPIPEN 2-PA

## 2020-11-23 NOTE — PATIENT INSTRUCTIONS
Thank you for choosing Preethi Santiago MD at [place] [address]  To Do: Jose Stokes  1. Please take meds as directed. [facility] is located in [address]. Monday, Tuesday & Friday – 8 a.m. to 4 p.m.   Wednesday, Thursday – 7 a.m. to 3 outweigh those potential risks and we strive to make you healthier and to improve your quality of life.     Referrals, and Radiology Information:    If your insurance requires a referral to a specialist, please allow 5 business days to process your referral

## 2020-12-09 RX ORDER — LORAZEPAM 1 MG/1
1 TABLET ORAL 2 TIMES DAILY PRN
Qty: 30 TABLET | Refills: 0 | Status: SHIPPED | OUTPATIENT
Start: 2020-12-09 | End: 2021-01-19

## 2020-12-09 NOTE — TELEPHONE ENCOUNTER
Requesting Lorazepam 1mg  LOV: 11/23/2020  RTC: prn  Last Relevant Labs: 9/24/19  Filled: 8/26/2020 #30 with 0 refills    No future appointments.     [place] , last disp[place] 8/26/2020 #30    Rx pended and routed for approval/denial

## 2020-12-22 ENCOUNTER — OFFICE VISIT (OUTPATIENT)
Dept: FAMILY MEDICINE CLINIC | Facility: CLINIC | Age: 50
End: 2020-12-22
Payer: COMMERCIAL

## 2020-12-22 VITALS
BODY MASS INDEX: 39.1 KG/M2 | HEIGHT: 69 IN | HEART RATE: 106 BPM | OXYGEN SATURATION: 100 % | DIASTOLIC BLOOD PRESSURE: 88 MMHG | RESPIRATION RATE: 18 BRPM | WEIGHT: 264 LBS | SYSTOLIC BLOOD PRESSURE: 132 MMHG | TEMPERATURE: 97 F

## 2020-12-22 DIAGNOSIS — L50.9 HIVES: ICD-10-CM

## 2020-12-22 DIAGNOSIS — M47.816 OSTEOARTHRITIS OF LUMBAR SPINE, UNSPECIFIED SPINAL OSTEOARTHRITIS COMPLICATION STATUS: ICD-10-CM

## 2020-12-22 DIAGNOSIS — G89.29 OTHER CHRONIC PAIN: ICD-10-CM

## 2020-12-22 DIAGNOSIS — T78.3XXD ANGIOEDEMA, SUBSEQUENT ENCOUNTER: ICD-10-CM

## 2020-12-22 DIAGNOSIS — Z23 NEED FOR VACCINATION: ICD-10-CM

## 2020-12-22 DIAGNOSIS — G89.4 CHRONIC PAIN SYNDROME: ICD-10-CM

## 2020-12-22 DIAGNOSIS — Z12.31 ENCOUNTER FOR SCREENING MAMMOGRAM FOR MALIGNANT NEOPLASM OF BREAST: ICD-10-CM

## 2020-12-22 DIAGNOSIS — Z00.00 WELLNESS EXAMINATION: Primary | ICD-10-CM

## 2020-12-22 PROCEDURE — 3079F DIAST BP 80-89 MM HG: CPT | Performed by: FAMILY MEDICINE

## 2020-12-22 PROCEDURE — 99214 OFFICE O/P EST MOD 30 MIN: CPT | Performed by: FAMILY MEDICINE

## 2020-12-22 PROCEDURE — 3075F SYST BP GE 130 - 139MM HG: CPT | Performed by: FAMILY MEDICINE

## 2020-12-22 PROCEDURE — 99396 PREV VISIT EST AGE 40-64: CPT | Performed by: FAMILY MEDICINE

## 2020-12-22 PROCEDURE — 3008F BODY MASS INDEX DOCD: CPT | Performed by: FAMILY MEDICINE

## 2020-12-22 RX ORDER — PREDNISONE 10 MG/1
TABLET ORAL
COMMUNITY
Start: 2020-10-28 | End: 2021-05-10 | Stop reason: DRUGHIGH

## 2020-12-22 RX ORDER — KETOTIFEN FUMARATE 100 %
2 POWDER (GRAM) MISCELLANEOUS
COMMUNITY
Start: 2020-09-30

## 2020-12-22 RX ORDER — TRAMADOL HYDROCHLORIDE 50 MG/1
TABLET ORAL
Qty: 240 TABLET | Refills: 0 | Status: SHIPPED | OUTPATIENT
Start: 2020-12-22 | End: 2021-01-19

## 2020-12-22 RX ORDER — HYDROCODONE BITARTRATE AND ACETAMINOPHEN 10; 325 MG/1; MG/1
1 TABLET ORAL EVERY 8 HOURS PRN
Qty: 90 TABLET | Refills: 0 | Status: SHIPPED | OUTPATIENT
Start: 2020-12-22 | End: 2020-12-22

## 2020-12-22 RX ORDER — HYDROCODONE BITARTRATE AND ACETAMINOPHEN 10; 325 MG/1; MG/1
1 TABLET ORAL EVERY 8 HOURS PRN
Qty: 90 TABLET | Refills: 0 | Status: SHIPPED | OUTPATIENT
Start: 2020-12-22 | End: 2021-01-19

## 2020-12-22 RX ORDER — MONTELUKAST SODIUM 10 MG/1
10 TABLET ORAL EVERY EVENING
COMMUNITY
Start: 2020-09-16 | End: 2021-06-17

## 2020-12-22 RX ORDER — FAMOTIDINE 20 MG/1
20 TABLET ORAL 2 TIMES DAILY
COMMUNITY
Start: 2020-09-22

## 2020-12-22 NOTE — PATIENT INSTRUCTIONS
Thank you for choosing Cyn Damico MD at [place] [address]  To Do: Mk Duval  1. Please see age appropriate health prevention below    [facility] is located in [address]. Monday, Tuesday & Friday – 8 a.m. to 4 p.m.   Wednesday, T that the benefits outweigh those potential risks and we strive to make you healthier and to improve your quality of life.     Referrals, and Radiology Information:    If your insurance requires a referral to a specialist, please allow 5 business days to pro any age who are overweight or obese and have 1 or more additional risk factors for diabetes At least every 3 years1   Type 2 diabetes or prediabetes All women diagnosed with gestational diabetes Lifelong testing every 3 years   Type 2 diabetes All women wi this age group At routine exams   Gonorrhea Sexually active women at increased risk for infection At routine exams   Hepatitis C Anyone at increased risk; 1 time for those born between [facility] At routine exams   High cholesterol or triglycerides All Pneumococcal conjugate vaccine (PCV13) and pneumococcal polysaccharide vaccine (PPSV23) Women at increased risk for infection–talk with your healthcare provider 1 or 2 doses   Tetanus/diphtheria/pertussis (Td/Tdap) booster All women in this age group A 1

## 2020-12-22 NOTE — PROGRESS NOTES
Wellness Exam    REASON FOR VISIT:    Amanda Hargrove is a 53year old female who presents for an [address]     Current Complaints: Ms. Beaver Captain is here for her wellness exam  Flu shot: see immunization record  Health Maintenance Topics SERVICES  INDICATIONS AND SCHEDULE Recommendation Internal Lab or Procedure External Lab or Procedure   Breast Cancer Screening   Every 2 yrs age 54-69 Mammogram due on 09/16/2011    Pap Every 3 yrs age 21-65 or Pap and HPV every 5 yrs age 33-67 Pap Smear, reaction  Penicillin G            RASH    CURRENT MEDICATIONS:   Current Outpatient Medications   Medication Sig Dispense Refill   • Ketotifen Fumarate (KETOTIFEN HYDROGEN FUMARATE) Does not apply Powder 2 mg.      • Montelukast Sodium 10 MG Oral Tab Take 1 Past Medical History:   Diagnosis Date   • Back problem    • Blood disorder     Factor V Liden   • Migraines    • Osteoarthritis    • Visual impairment     glasses      Past Surgical History:   Procedure Laterality Date   • ANTERIOR POSTERIOR REPAIR N/A normal. Pupils are equal, round, and reactive to light. No scleral icterus. Neck: Normal range of motion. No thyromegaly present. Cardiovascular: Normal rate, regular rhythm and normal heart sounds. Exam reveals no friction rub. No murmur heard.   P mouth every 4 to 6 hours as needed for Pain. Trang Saldaña for early refill today  -     HYDROcodone-acetaminophen (NORCO)  MG Oral Tab; Take 1 tablet by mouth every 8 (eight) hours as needed for Pain.  Oxycodone is to be discontinued effective today due to [facility] Vaccines(1 of 2) due on 09/16/2021  Annual Physical due on 12/22/2021  Pneumococcal Vaccine: Birth to 64yrs Completed  Patient/Caregiver Education:  Patient/Caregiver Education: There are no barriers to learning. Medical education done.   Outcome: Patient v

## 2021-01-19 ENCOUNTER — TELEPHONE (OUTPATIENT)
Dept: FAMILY MEDICINE CLINIC | Facility: CLINIC | Age: 51
End: 2021-01-19

## 2021-01-19 ENCOUNTER — OFFICE VISIT (OUTPATIENT)
Dept: FAMILY MEDICINE CLINIC | Facility: CLINIC | Age: 51
End: 2021-01-19
Payer: COMMERCIAL

## 2021-01-19 VITALS
HEART RATE: 90 BPM | SYSTOLIC BLOOD PRESSURE: 130 MMHG | DIASTOLIC BLOOD PRESSURE: 88 MMHG | BODY MASS INDEX: 39.99 KG/M2 | WEIGHT: 270 LBS | TEMPERATURE: 98 F | RESPIRATION RATE: 16 BRPM | HEIGHT: 69 IN | OXYGEN SATURATION: 100 %

## 2021-01-19 DIAGNOSIS — M47.816 OSTEOARTHRITIS OF LUMBAR SPINE, UNSPECIFIED SPINAL OSTEOARTHRITIS COMPLICATION STATUS: ICD-10-CM

## 2021-01-19 DIAGNOSIS — L50.9 HIVES: ICD-10-CM

## 2021-01-19 DIAGNOSIS — G89.4 CHRONIC PAIN SYNDROME: ICD-10-CM

## 2021-01-19 DIAGNOSIS — Z00.00 WELLNESS EXAMINATION: Primary | ICD-10-CM

## 2021-01-19 DIAGNOSIS — G89.29 OTHER CHRONIC PAIN: ICD-10-CM

## 2021-01-19 PROCEDURE — 3008F BODY MASS INDEX DOCD: CPT | Performed by: FAMILY MEDICINE

## 2021-01-19 PROCEDURE — 99396 PREV VISIT EST AGE 40-64: CPT | Performed by: FAMILY MEDICINE

## 2021-01-19 PROCEDURE — 3075F SYST BP GE 130 - 139MM HG: CPT | Performed by: FAMILY MEDICINE

## 2021-01-19 PROCEDURE — 3079F DIAST BP 80-89 MM HG: CPT | Performed by: FAMILY MEDICINE

## 2021-01-19 RX ORDER — TRAMADOL HYDROCHLORIDE 50 MG/1
TABLET ORAL
Qty: 240 TABLET | Refills: 0 | Status: SHIPPED | OUTPATIENT
Start: 2021-01-19 | End: 2021-02-17

## 2021-01-19 RX ORDER — HYDROCODONE BITARTRATE AND ACETAMINOPHEN 10; 325 MG/1; MG/1
1-2 TABLET ORAL EVERY 8 HOURS PRN
Qty: 90 TABLET | Refills: 0 | Status: SHIPPED | OUTPATIENT
Start: 2021-01-19 | End: 2021-02-15

## 2021-01-19 RX ORDER — NALOXONE HYDROCHLORIDE 4 MG/.1ML
4 SPRAY, METERED NASAL AS NEEDED
Qty: 1 KIT | Refills: 0 | Status: SHIPPED | OUTPATIENT
Start: 2021-01-19

## 2021-01-19 NOTE — PROGRESS NOTES
Wellness Exam    REASON FOR VISIT:    Norma Magana is a 53year old female who presents for an [address].     Current Complaints: Ms. Mack Tavares is here for her foster wellness exam  Flu shot: see immunization record  Health Maintenance FOB, OCCULTSTOOL    Obesity Screening Screen all adults annually Body mass index is 39.87 kg/m².       Preventive Services for Which Recommendations Vary with Risk Recommendation Internal Lab or Procedure External Lab or Procedure   Cholesterol Screening Re daily.     • Ketotifen Fumarate (KETOTIFEN HYDROGEN FUMARATE) Does not apply Powder 2 mg. • Montelukast Sodium 10 MG Oral Tab Take 10 mg by mouth every evening.      • predniSONE 10 MG Oral Tab TAKE ONE TABLET BY MOUTH ONE TIME DAILY FOR ONSET OF Kalin Jin and difficulty urinating. Musculoskeletal:  no gait problem. Skin: Negative for color change   Neurological: Negative for tremors, weakness and numbness. Hematological: Negative for adenopathy. Does not bruise/bleed easily.    Psychiatric/Behavioral: pain  Hives    PLAN SUMMARY:   Yany Moon is a 53year old female  Age appropriate cancer screening, labs, safety, immunizations were discussed with the patient and ordered as follows:    ΣΑΡΑΝΤΙ was seen today for complete form.     Diagnoses and the defined types were placed in this encounter.       Imaging & Consults:  None    Her 5 year prevention plan includes: annual visits for fasting labs  Pap Smear,3 Years due on 09/16/2002  Mammogram due on 09/16/2011  Influenza Vaccine(1) due on 12/22/2021

## 2021-01-19 NOTE — PATIENT INSTRUCTIONS
Thank you for choosing Gale Fisher MD at [place] [address]  To Do: Sulema Moran  1. Please see age appropriate health prevention below    [facility] is located in [address]. Monday, Tuesday & Friday – 8 a.m. to 4 p.m.   Wednesday, T that the benefits outweigh those potential risks and we strive to make you healthier and to improve your quality of life.     Referrals, and Radiology Information:    If your insurance requires a referral to a specialist, please allow 5 business days to pro any age who are overweight or obese and have 1 or more additional risk factors for diabetes At least every 3 years1   Type 2 diabetes or prediabetes All women diagnosed with gestational diabetes Lifelong testing every 3 years   Type 2 diabetes All women wi this age group At routine exams   Gonorrhea Sexually active women at increased risk for infection At routine exams   Hepatitis C Anyone at increased risk; 1 time for those born between [facility] At routine exams   High cholesterol or triglycerides All Pneumococcal conjugate vaccine (PCV13) and pneumococcal polysaccharide vaccine (PPSV23) Women at increased risk for infection–talk with your healthcare provider 1 or 2 doses   Tetanus/diphtheria/pertussis (Td/Tdap) booster All women in this age group A 1

## 2021-01-19 NOTE — TELEPHONE ENCOUNTER
Spoke with pt to clarify which TB test she needs. Pt asking if she should have the skin test vs Quantiferon v chest xray due to her hypersensitive allergy state. Pt states last time she had the Quantiferon Gold test it cost her $250.   Pt states she needs T

## 2021-01-21 ENCOUNTER — TELEPHONE (OUTPATIENT)
Dept: FAMILY MEDICINE CLINIC | Facility: CLINIC | Age: 51
End: 2021-01-21

## 2021-01-21 DIAGNOSIS — Z11.1 SCREENING FOR TUBERCULOSIS: Primary | ICD-10-CM

## 2021-01-21 NOTE — TELEPHONE ENCOUNTER
I think the first step is for her to have a gold QuantiFERON test rather than doing a chest x-ray.   We will only do chest x-ray if she would test positive for PPD or gold QuantiFERON test.

## 2021-01-21 NOTE — TELEPHONE ENCOUNTER
Pt returned call, informed pt of Quantiferon Gold recommendation, pt verbalized understanding but asked if she then needed to come all the way \"out here\" to have that done.   Asked pt it there is a [facility] near her, pt states there is one in [facility] th

## 2021-01-27 ENCOUNTER — TELEPHONE (OUTPATIENT)
Dept: FAMILY MEDICINE CLINIC | Facility: CLINIC | Age: 51
End: 2021-01-27

## 2021-02-01 ENCOUNTER — TELEPHONE (OUTPATIENT)
Dept: FAMILY MEDICINE CLINIC | Facility: CLINIC | Age: 51
End: 2021-02-01

## 2021-02-01 DIAGNOSIS — Z11.1 SCREENING FOR TUBERCULOSIS: Primary | ICD-10-CM

## 2021-02-03 LAB
MITOGEN-NIL: >10 IU/ML
NIL: 0.01 IU/ML
QUANTIFERON(R)-TB GOLD PLUS, 1 TUBE: NEGATIVE
TB1-NIL: 0 IU/ML
TB2-NIL: 0.01 IU/ML

## 2021-02-15 ENCOUNTER — TELEMEDICINE (OUTPATIENT)
Dept: FAMILY MEDICINE CLINIC | Facility: CLINIC | Age: 51
End: 2021-02-15
Payer: COMMERCIAL

## 2021-02-15 DIAGNOSIS — G89.29 OTHER CHRONIC PAIN: ICD-10-CM

## 2021-02-15 DIAGNOSIS — M47.816 OSTEOARTHRITIS OF LUMBAR SPINE, UNSPECIFIED SPINAL OSTEOARTHRITIS COMPLICATION STATUS: ICD-10-CM

## 2021-02-15 DIAGNOSIS — G89.4 CHRONIC PAIN SYNDROME: Primary | ICD-10-CM

## 2021-02-15 DIAGNOSIS — L50.9 HIVES: ICD-10-CM

## 2021-02-15 DIAGNOSIS — G89.29 CHRONIC PAIN OF RIGHT KNEE: ICD-10-CM

## 2021-02-15 DIAGNOSIS — M25.561 CHRONIC PAIN OF RIGHT KNEE: ICD-10-CM

## 2021-02-15 PROCEDURE — 99214 OFFICE O/P EST MOD 30 MIN: CPT | Performed by: FAMILY MEDICINE

## 2021-02-15 RX ORDER — HYDROCODONE BITARTRATE AND ACETAMINOPHEN 10; 325 MG/1; MG/1
1-2 TABLET ORAL EVERY 8 HOURS PRN
Qty: 90 TABLET | Refills: 0 | Status: SHIPPED | OUTPATIENT
Start: 2021-02-15 | End: 2021-03-10

## 2021-02-15 RX ORDER — HYDROCODONE BITARTRATE AND ACETAMINOPHEN 10; 325 MG/1; MG/1
1-2 TABLET ORAL EVERY 8 HOURS PRN
Qty: 90 TABLET | Refills: 0 | OUTPATIENT
Start: 2021-02-15 | End: 2021-02-15

## 2021-02-15 NOTE — PROGRESS NOTES
HPI:    Patient ID: Brandy Powell is a 53year old female. HPI  Ms. Melia Nick is a pleasant 52 y/o F presenting for a video visit for chronic R knee pain.  This has been ongoing for the past several months but had worsened over the recent weeks it tablet (10 mg total) by mouth nightly as needed. 30 tablet 5   • cetirizine 10 MG Oral Tab Take 20 mg by mouth daily. • caffeine 200 MG Oral Tab Take 200 mg by mouth daily.        • EPINEPHrine (EPIPEN 2-HERRERA) 0.3 MG/0.3ML Injection Solution Auto-injec

## 2021-02-15 NOTE — PATIENT INSTRUCTIONS
Thank you for choosing Preethi Santiago MD at [place] [address]  To Do: Jose Stokes  1. Please take meds as directed. [facility] is located in [address]. Monday, Tuesday & Friday – 8 a.m. to 4 p.m.   Wednesday, Thursday – 7 a.m. to 3 outweigh those potential risks and we strive to make you healthier and to improve your quality of life.     Referrals, and Radiology Information:    If your insurance requires a referral to a specialist, please allow 5 business days to process your referral

## 2021-02-16 DIAGNOSIS — G89.29 OTHER CHRONIC PAIN: ICD-10-CM

## 2021-02-16 DIAGNOSIS — M47.816 OSTEOARTHRITIS OF LUMBAR SPINE, UNSPECIFIED SPINAL OSTEOARTHRITIS COMPLICATION STATUS: ICD-10-CM

## 2021-02-16 DIAGNOSIS — L50.9 HIVES: ICD-10-CM

## 2021-02-16 DIAGNOSIS — G89.4 CHRONIC PAIN SYNDROME: ICD-10-CM

## 2021-02-16 NOTE — TELEPHONE ENCOUNTER
Requested Prescriptions     Pending Prescriptions Disp Refills   • TRAMADOL HCL 50 MG Oral Tab [Pharmacy Med Name: TRAMADOL HCL 50MG TABLET] 240 tablet 0     Sig: TAKE 1-2 TABLETS ( MG TOTAL) BY MOUTH EVERY 4 TO 6 HOURS AS NEEDED FOR PAIN     LOV: 2/

## 2021-02-17 RX ORDER — TRAMADOL HYDROCHLORIDE 50 MG/1
TABLET ORAL
Qty: 240 TABLET | Refills: 0 | Status: SHIPPED | OUTPATIENT
Start: 2021-02-17 | End: 2021-03-15

## 2021-03-10 DIAGNOSIS — G89.4 CHRONIC PAIN SYNDROME: ICD-10-CM

## 2021-03-10 DIAGNOSIS — L50.9 HIVES: ICD-10-CM

## 2021-03-10 DIAGNOSIS — G89.29 OTHER CHRONIC PAIN: ICD-10-CM

## 2021-03-10 DIAGNOSIS — M47.816 OSTEOARTHRITIS OF LUMBAR SPINE, UNSPECIFIED SPINAL OSTEOARTHRITIS COMPLICATION STATUS: ICD-10-CM

## 2021-03-10 RX ORDER — HYDROCODONE BITARTRATE AND ACETAMINOPHEN 10; 325 MG/1; MG/1
1-2 TABLET ORAL EVERY 8 HOURS PRN
Qty: 90 TABLET | Refills: 0 | Status: SHIPPED | OUTPATIENT
Start: 2021-03-10 | End: 2021-04-08

## 2021-03-13 DIAGNOSIS — Z23 NEED FOR VACCINATION: ICD-10-CM

## 2021-03-14 DIAGNOSIS — M47.816 OSTEOARTHRITIS OF LUMBAR SPINE, UNSPECIFIED SPINAL OSTEOARTHRITIS COMPLICATION STATUS: ICD-10-CM

## 2021-03-14 DIAGNOSIS — G89.4 CHRONIC PAIN SYNDROME: ICD-10-CM

## 2021-03-14 DIAGNOSIS — L50.9 HIVES: ICD-10-CM

## 2021-03-14 DIAGNOSIS — G89.29 OTHER CHRONIC PAIN: ICD-10-CM

## 2021-03-15 RX ORDER — TRAMADOL HYDROCHLORIDE 50 MG/1
TABLET ORAL
Qty: 240 TABLET | Refills: 0 | Status: SHIPPED | OUTPATIENT
Start: 2021-03-15 | End: 2021-04-14

## 2021-03-22 ENCOUNTER — TELEPHONE (OUTPATIENT)
Dept: FAMILY MEDICINE CLINIC | Facility: CLINIC | Age: 51
End: 2021-03-22

## 2021-03-22 RX ORDER — LORAZEPAM 1 MG/1
1 TABLET ORAL 2 TIMES DAILY PRN
Qty: 30 TABLET | Refills: 0 | Status: SHIPPED | OUTPATIENT
Start: 2021-03-22 | End: 2021-05-25

## 2021-03-22 NOTE — TELEPHONE ENCOUNTER
- Pt states she is having trouble breathing as she has missed 4 doses of her Ketotifen Fumarate (KETOTIFEN HYDROGEN FUMARATE) Does not apply Powder. Can you call in Ativan until pt receives her rx delivered.     Victor Hugo Morrell

## 2021-04-08 DIAGNOSIS — M47.816 OSTEOARTHRITIS OF LUMBAR SPINE, UNSPECIFIED SPINAL OSTEOARTHRITIS COMPLICATION STATUS: ICD-10-CM

## 2021-04-08 DIAGNOSIS — G89.29 OTHER CHRONIC PAIN: ICD-10-CM

## 2021-04-08 DIAGNOSIS — G89.4 CHRONIC PAIN SYNDROME: ICD-10-CM

## 2021-04-08 DIAGNOSIS — L50.9 HIVES: ICD-10-CM

## 2021-04-08 RX ORDER — HYDROCODONE BITARTRATE AND ACETAMINOPHEN 10; 325 MG/1; MG/1
1-2 TABLET ORAL EVERY 8 HOURS PRN
Qty: 90 TABLET | Refills: 0 | Status: SHIPPED | OUTPATIENT
Start: 2021-04-08 | End: 2021-04-29

## 2021-04-08 NOTE — TELEPHONE ENCOUNTER
MRI order faxed to [facility], [phone], confirmation received. Correct fax number obtained from [facility].

## 2021-04-08 NOTE — TELEPHONE ENCOUNTER
Received     Requesting [place] refill sent to [facility] and MRI order to be sent to [facility] [address] phone # [phone]

## 2021-04-14 ENCOUNTER — OFFICE VISIT (OUTPATIENT)
Dept: FAMILY MEDICINE CLINIC | Facility: CLINIC | Age: 51
End: 2021-04-14
Payer: COMMERCIAL

## 2021-04-14 ENCOUNTER — TELEPHONE (OUTPATIENT)
Dept: FAMILY MEDICINE CLINIC | Facility: CLINIC | Age: 51
End: 2021-04-14

## 2021-04-14 VITALS
HEIGHT: 69 IN | OXYGEN SATURATION: 99 % | BODY MASS INDEX: 40.58 KG/M2 | SYSTOLIC BLOOD PRESSURE: 136 MMHG | TEMPERATURE: 98 F | HEART RATE: 82 BPM | WEIGHT: 274 LBS | DIASTOLIC BLOOD PRESSURE: 88 MMHG | RESPIRATION RATE: 18 BRPM

## 2021-04-14 DIAGNOSIS — L50.9 HIVES: Primary | ICD-10-CM

## 2021-04-14 DIAGNOSIS — G89.4 CHRONIC PAIN SYNDROME: ICD-10-CM

## 2021-04-14 PROCEDURE — 99213 OFFICE O/P EST LOW 20 MIN: CPT | Performed by: FAMILY MEDICINE

## 2021-04-14 PROCEDURE — 3079F DIAST BP 80-89 MM HG: CPT | Performed by: FAMILY MEDICINE

## 2021-04-14 PROCEDURE — 3008F BODY MASS INDEX DOCD: CPT | Performed by: FAMILY MEDICINE

## 2021-04-14 PROCEDURE — 3075F SYST BP GE 130 - 139MM HG: CPT | Performed by: FAMILY MEDICINE

## 2021-04-14 RX ORDER — TRAMADOL HYDROCHLORIDE 50 MG/1
TABLET ORAL
Qty: 240 TABLET | Refills: 0 | Status: SHIPPED | OUTPATIENT
Start: 2021-04-14 | End: 2021-05-10

## 2021-04-14 NOTE — TELEPHONE ENCOUNTER
FMLA ppw completed by MD and faxed to [phone] [employer] [place] @ [phone].   Placed ppw in scan bin

## 2021-04-14 NOTE — PROGRESS NOTES
HPI/Subjective:   Patient ID: Andreas Villarreal is a 53year old female. HPI  Ms. Dante Muro. Is a pleasant 67-year-old female with known history of autoimmune hives resulting in chronic pain which affects all her joints.   She is currently being manag Oral Tab Take 20 mg by mouth 2 (two) times daily. • Ketotifen Fumarate (KETOTIFEN HYDROGEN FUMARATE) Does not apply Powder 2 mg. • Montelukast Sodium 10 MG Oral Tab Take 10 mg by mouth every evening.      • predniSONE 10 MG Oral Tab TAKE ONE TABLET leg: No edema. Left lower leg: No edema. Lymphadenopathy:      Cervical: No cervical adenopathy. Neurological:      General: No focal deficit present. Mental Status: She is alert.    Psychiatric:         Mood and Affect: Mood normal.         B

## 2021-04-14 NOTE — PATIENT INSTRUCTIONS
Thank you for choosing Radha Ann MD at [place] [address]  To Do: Edith Jara  1. Please take meds as directed. [facility] is located in [address]. Monday, Tuesday & Friday – 8 a.m. to 4 p.m.   Wednesday, Thursday – 7 a.m. to 3 outweigh those potential risks and we strive to make you healthier and to improve your quality of life.     Referrals, and Radiology Information:    If your insurance requires a referral to a specialist, please allow 5 business days to process your referral

## 2021-04-28 DIAGNOSIS — G89.4 CHRONIC PAIN SYNDROME: ICD-10-CM

## 2021-04-28 DIAGNOSIS — L50.9 HIVES: ICD-10-CM

## 2021-04-28 DIAGNOSIS — G89.29 OTHER CHRONIC PAIN: ICD-10-CM

## 2021-04-28 DIAGNOSIS — M47.816 OSTEOARTHRITIS OF LUMBAR SPINE, UNSPECIFIED SPINAL OSTEOARTHRITIS COMPLICATION STATUS: ICD-10-CM

## 2021-04-28 RX ORDER — HYDROCODONE BITARTRATE AND ACETAMINOPHEN 10; 325 MG/1; MG/1
1-2 TABLET ORAL EVERY 8 HOURS PRN
Qty: 90 TABLET | Refills: 0 | OUTPATIENT
Start: 2021-04-28

## 2021-04-28 NOTE — TELEPHONE ENCOUNTER
Patient states she's due for a [place] refill, please advise. Also, wants the doctor to know she is scheduled on 5/4 for mri of the knee (@outside facility, he knows where), can't bear weight at all.

## 2021-04-28 NOTE — TELEPHONE ENCOUNTER
Requesting Norco 10/325mg  LOV: 4/14/21  RTC: 3 months  Last Relevant Labs: 9/24/19  Filled: 4/8/21 #90 with 0 refills    No future appointments.     [place] , last dispensed 4/8/21 #90 (15 day supply)    Rx pended and routed for approval/denial

## 2021-04-29 RX ORDER — HYDROCODONE BITARTRATE AND ACETAMINOPHEN 10; 325 MG/1; MG/1
1-2 TABLET ORAL EVERY 8 HOURS PRN
Qty: 90 TABLET | Refills: 0 | Status: SHIPPED | OUTPATIENT
Start: 2021-04-29 | End: 2021-05-10

## 2021-04-29 NOTE — TELEPHONE ENCOUNTER
Patient calling on status of [place] refill, patient requesting prescription to be sent today before the end of the day. Does not want to go through the weekend without medication.

## 2021-05-05 ENCOUNTER — TELEPHONE (OUTPATIENT)
Dept: FAMILY MEDICINE CLINIC | Facility: CLINIC | Age: 51
End: 2021-05-05

## 2021-05-10 ENCOUNTER — OFFICE VISIT (OUTPATIENT)
Dept: FAMILY MEDICINE CLINIC | Facility: CLINIC | Age: 51
End: 2021-05-10
Payer: COMMERCIAL

## 2021-05-10 ENCOUNTER — TELEPHONE (OUTPATIENT)
Dept: FAMILY MEDICINE CLINIC | Facility: CLINIC | Age: 51
End: 2021-05-10

## 2021-05-10 VITALS
HEIGHT: 69 IN | TEMPERATURE: 97 F | HEART RATE: 94 BPM | OXYGEN SATURATION: 100 % | BODY MASS INDEX: 39.1 KG/M2 | RESPIRATION RATE: 18 BRPM | WEIGHT: 264 LBS | DIASTOLIC BLOOD PRESSURE: 92 MMHG | SYSTOLIC BLOOD PRESSURE: 134 MMHG

## 2021-05-10 DIAGNOSIS — S83.241A TEAR OF MEDIAL MENISCUS OF RIGHT KNEE, UNSPECIFIED TEAR TYPE, UNSPECIFIED WHETHER OLD OR CURRENT TEAR, INITIAL ENCOUNTER: ICD-10-CM

## 2021-05-10 DIAGNOSIS — M25.561 CHRONIC PAIN OF RIGHT KNEE: Primary | ICD-10-CM

## 2021-05-10 DIAGNOSIS — G89.4 CHRONIC PAIN SYNDROME: ICD-10-CM

## 2021-05-10 DIAGNOSIS — S83.511A RUPTURE OF ANTERIOR CRUCIATE LIGAMENT OF RIGHT KNEE, INITIAL ENCOUNTER: ICD-10-CM

## 2021-05-10 DIAGNOSIS — L50.9 HIVES: ICD-10-CM

## 2021-05-10 DIAGNOSIS — G89.29 CHRONIC PAIN OF RIGHT KNEE: Primary | ICD-10-CM

## 2021-05-10 DIAGNOSIS — M47.816 OSTEOARTHRITIS OF LUMBAR SPINE, UNSPECIFIED SPINAL OSTEOARTHRITIS COMPLICATION STATUS: ICD-10-CM

## 2021-05-10 DIAGNOSIS — G89.29 OTHER CHRONIC PAIN: ICD-10-CM

## 2021-05-10 PROCEDURE — 3008F BODY MASS INDEX DOCD: CPT | Performed by: FAMILY MEDICINE

## 2021-05-10 PROCEDURE — 99214 OFFICE O/P EST MOD 30 MIN: CPT | Performed by: FAMILY MEDICINE

## 2021-05-10 PROCEDURE — 3075F SYST BP GE 130 - 139MM HG: CPT | Performed by: FAMILY MEDICINE

## 2021-05-10 PROCEDURE — 3080F DIAST BP >= 90 MM HG: CPT | Performed by: FAMILY MEDICINE

## 2021-05-10 RX ORDER — TRAMADOL HYDROCHLORIDE 50 MG/1
TABLET ORAL
Qty: 240 TABLET | Refills: 0 | Status: SHIPPED | OUTPATIENT
Start: 2021-05-10 | End: 2021-05-25

## 2021-05-10 RX ORDER — PREDNISONE 1 MG/1
0.25 TABLET ORAL
COMMUNITY
End: 2021-06-17 | Stop reason: ALTCHOICE

## 2021-05-10 RX ORDER — HYDROCODONE BITARTRATE AND ACETAMINOPHEN 10; 325 MG/1; MG/1
1-2 TABLET ORAL
Qty: 90 TABLET | Refills: 0 | Status: SHIPPED | OUTPATIENT
Start: 2021-05-10 | End: 2021-05-18

## 2021-05-10 RX ORDER — FEXOFENADINE HCL 180 MG/1
180 TABLET ORAL DAILY
COMMUNITY

## 2021-05-10 NOTE — TELEPHONE ENCOUNTER
Patient states she has an appt with ortho for Fri 5/14 @ 9:45 with Dr. Meredith Cuevas. Just notification.

## 2021-05-10 NOTE — PROGRESS NOTES
HPI/Subjective:   Patient ID: Mk Duval is a 53year old female. HPI  Ms. Joni Acuña is a pleasant 64-year-old female with history of autoimmune hives for which she goes to allergy specialist and is currently on prednisone, lumbar DJD and osteo MG/0.1ML Nasal Liquid 4 mg by Nasal route as needed. If patient remains unresponsive, repeat dose in other nostril 2-5 minutes after first dose. 1 kit 0   • famoTIDine 20 MG Oral Tab Take 20 mg by mouth 2 (two) times daily.      • Ketotifen Fumarate (KETOTI evaluation from orthopedics  –We will fill out paperwork needed for handicap placard for DMV  No orders of the defined types were placed in this encounter.       Meds This Visit:  Requested Prescriptions     Signed Prescriptions Disp Refills   • HYDROcodone Hypertension

## 2021-05-10 NOTE — PATIENT INSTRUCTIONS
Thank you for choosing Jennifer Huber MD at [facility]  To Do: Alfie Gil  1. Please take meds as directed. [facility] is located in [address]. Monday, Tuesday & Friday – 8 a.m. to 4 p.m.   Wednesday, Thursday – 7 a.m. to 3 outweigh those potential risks and we strive to make you healthier and to improve your quality of life.     Referrals, and Radiology Information:    If your insurance requires a referral to a specialist, please allow 5 business days to process your referral

## 2021-05-18 ENCOUNTER — TELEMEDICINE (OUTPATIENT)
Dept: FAMILY MEDICINE CLINIC | Facility: CLINIC | Age: 51
End: 2021-05-18

## 2021-05-18 DIAGNOSIS — S83.511A RUPTURE OF ANTERIOR CRUCIATE LIGAMENT OF RIGHT KNEE, INITIAL ENCOUNTER: ICD-10-CM

## 2021-05-18 DIAGNOSIS — G89.29 OTHER CHRONIC PAIN: ICD-10-CM

## 2021-05-18 DIAGNOSIS — G89.4 CHRONIC PAIN SYNDROME: ICD-10-CM

## 2021-05-18 DIAGNOSIS — M25.561 CHRONIC PAIN OF RIGHT KNEE: ICD-10-CM

## 2021-05-18 DIAGNOSIS — L50.9 HIVES: ICD-10-CM

## 2021-05-18 DIAGNOSIS — G89.29 CHRONIC PAIN OF RIGHT KNEE: ICD-10-CM

## 2021-05-18 DIAGNOSIS — S83.241A TEAR OF MEDIAL MENISCUS OF RIGHT KNEE, UNSPECIFIED TEAR TYPE, UNSPECIFIED WHETHER OLD OR CURRENT TEAR, INITIAL ENCOUNTER: Primary | ICD-10-CM

## 2021-05-18 DIAGNOSIS — M47.816 OSTEOARTHRITIS OF LUMBAR SPINE, UNSPECIFIED SPINAL OSTEOARTHRITIS COMPLICATION STATUS: ICD-10-CM

## 2021-05-18 PROCEDURE — 99214 OFFICE O/P EST MOD 30 MIN: CPT | Performed by: FAMILY MEDICINE

## 2021-05-18 RX ORDER — HYDROCODONE BITARTRATE AND ACETAMINOPHEN 10; 325 MG/1; MG/1
1-2 TABLET ORAL
Qty: 90 TABLET | Refills: 0 | Status: SHIPPED | OUTPATIENT
Start: 2021-05-18 | End: 2021-06-17 | Stop reason: ALTCHOICE

## 2021-05-18 NOTE — PATIENT INSTRUCTIONS
Thank you for choosing Ching Lama MD at [place] [address]  To Do: Elijah Fail  1. Please take meds as directed. [facility] is located in [address]. Monday, Tuesday & Friday – 8 a.m. to 4 p.m.   Wednesday, Thursday – 7 a.m. to 3 outweigh those potential risks and we strive to make you healthier and to improve your quality of life.     Referrals, and Radiology Information:    If your insurance requires a referral to a specialist, please allow 5 business days to process your referral

## 2021-05-20 ENCOUNTER — TELEPHONE (OUTPATIENT)
Dept: FAMILY MEDICINE CLINIC | Facility: CLINIC | Age: 51
End: 2021-05-20

## 2021-05-20 NOTE — TELEPHONE ENCOUNTER
Pt called after her surgery and would like to inform PCP that her surgeon advised her to stop the [facility] for 2 days and wrote a script for Percocet #20.

## 2021-05-25 ENCOUNTER — OFFICE VISIT (OUTPATIENT)
Dept: FAMILY MEDICINE CLINIC | Facility: CLINIC | Age: 51
End: 2021-05-25
Payer: COMMERCIAL

## 2021-05-25 VITALS
WEIGHT: 264 LBS | RESPIRATION RATE: 16 BRPM | HEART RATE: 92 BPM | DIASTOLIC BLOOD PRESSURE: 80 MMHG | OXYGEN SATURATION: 98 % | SYSTOLIC BLOOD PRESSURE: 120 MMHG | HEIGHT: 69 IN | TEMPERATURE: 98 F | BODY MASS INDEX: 39.1 KG/M2

## 2021-05-25 DIAGNOSIS — Z98.890 S/P RIGHT KNEE ARTHROSCOPY: Primary | ICD-10-CM

## 2021-05-25 DIAGNOSIS — M47.816 OSTEOARTHRITIS OF LUMBAR SPINE, UNSPECIFIED SPINAL OSTEOARTHRITIS COMPLICATION STATUS: ICD-10-CM

## 2021-05-25 DIAGNOSIS — G89.4 CHRONIC PAIN SYNDROME: ICD-10-CM

## 2021-05-25 DIAGNOSIS — G89.29 CHRONIC PAIN OF RIGHT KNEE: ICD-10-CM

## 2021-05-25 DIAGNOSIS — S83.241A TEAR OF MEDIAL MENISCUS OF RIGHT KNEE, UNSPECIFIED TEAR TYPE, UNSPECIFIED WHETHER OLD OR CURRENT TEAR, INITIAL ENCOUNTER: ICD-10-CM

## 2021-05-25 DIAGNOSIS — S83.511A RUPTURE OF ANTERIOR CRUCIATE LIGAMENT OF RIGHT KNEE, INITIAL ENCOUNTER: ICD-10-CM

## 2021-05-25 DIAGNOSIS — L50.9 HIVES: ICD-10-CM

## 2021-05-25 DIAGNOSIS — M25.561 CHRONIC PAIN OF RIGHT KNEE: ICD-10-CM

## 2021-05-25 PROCEDURE — 3074F SYST BP LT 130 MM HG: CPT | Performed by: FAMILY MEDICINE

## 2021-05-25 PROCEDURE — 99214 OFFICE O/P EST MOD 30 MIN: CPT | Performed by: FAMILY MEDICINE

## 2021-05-25 PROCEDURE — 3008F BODY MASS INDEX DOCD: CPT | Performed by: FAMILY MEDICINE

## 2021-05-25 PROCEDURE — 3079F DIAST BP 80-89 MM HG: CPT | Performed by: FAMILY MEDICINE

## 2021-05-25 RX ORDER — OXYCODONE HYDROCHLORIDE AND ACETAMINOPHEN 5; 325 MG/1; MG/1
1 TABLET ORAL EVERY 6 HOURS PRN
COMMUNITY
Start: 2021-05-20 | End: 2021-06-17 | Stop reason: ALTCHOICE

## 2021-05-25 RX ORDER — OXYCODONE AND ACETAMINOPHEN 10; 325 MG/1; MG/1
1-2 TABLET ORAL EVERY 6 HOURS PRN
Qty: 90 TABLET | Refills: 0 | Status: SHIPPED | OUTPATIENT
Start: 2021-05-25 | End: 2021-06-17 | Stop reason: ALTCHOICE

## 2021-05-25 RX ORDER — ASPIRIN 325 MG
TABLET, DELAYED RELEASE (ENTERIC COATED) ORAL
COMMUNITY
Start: 2021-05-14

## 2021-05-25 RX ORDER — TRAMADOL HYDROCHLORIDE 50 MG/1
TABLET ORAL
Qty: 240 TABLET | Refills: 0 | Status: SHIPPED | OUTPATIENT
Start: 2021-05-25 | End: 2021-06-15

## 2021-05-25 RX ORDER — LORAZEPAM 1 MG/1
1 TABLET ORAL 2 TIMES DAILY PRN
Qty: 30 TABLET | Refills: 0 | Status: SHIPPED | OUTPATIENT
Start: 2021-05-25 | End: 2021-11-17

## 2021-05-25 RX ORDER — NAPROXEN 500 MG/1
TABLET ORAL
COMMUNITY
Start: 2021-05-14 | End: 2021-06-17 | Stop reason: ALTCHOICE

## 2021-05-25 RX ORDER — MUPIROCIN CALCIUM 20 MG/G
1 CREAM TOPICAL DAILY
Qty: 30 G | Refills: 3 | Status: SHIPPED | OUTPATIENT
Start: 2021-05-25 | End: 2021-10-20

## 2021-05-25 NOTE — PROGRESS NOTES
HPI/Subjective:   Patient ID: Feli Downing is a 53year old female. HPI  Ms. Jenni Méndez is a pleasant 43-year-old female with history of chronic pain syndrome secondary to lumbar degenerative joint disease who recently underwent arthroscopic surge hours as needed for Pain. 90 tablet 0   • predniSONE 1 MG Oral Tab Take 0.25 mg by mouth daily with breakfast.     • Fexofenadine HCl 180 MG Oral Tab Take 180 mg by mouth daily. • Naloxone HCl 4 MG/0.1ML Nasal Liquid 4 mg by Nasal route as needed.  If p lumbar spine, unspecified spinal osteoarthritis complication status  Chronic pain syndrome  Hives  Rupture of anterior cruciate ligament of right knee, initial encounter  -I reminded her that she can now establish and is maintaining a pain contract that we

## 2021-05-25 NOTE — PATIENT INSTRUCTIONS
Thank you for choosing Argelia Cintron MD at [place] [address]  To Do: Wilfred Carolina  1. Please take meds as directed. [facility] is located in [address]. Monday, Tuesday & Friday – 8 a.m. to 4 p.m.   Wednesday, Thursday – 7 a.m. to 3 outweigh those potential risks and we strive to make you healthier and to improve your quality of life.     Referrals, and Radiology Information:    If your insurance requires a referral to a specialist, please allow 5 business days to process your referral

## 2021-06-10 ENCOUNTER — MED REC SCAN ONLY (OUTPATIENT)
Dept: FAMILY MEDICINE CLINIC | Facility: CLINIC | Age: 51
End: 2021-06-10

## 2021-06-15 ENCOUNTER — TELEPHONE (OUTPATIENT)
Dept: FAMILY MEDICINE CLINIC | Facility: CLINIC | Age: 51
End: 2021-06-15

## 2021-06-15 DIAGNOSIS — M47.816 OSTEOARTHRITIS OF LUMBAR SPINE, UNSPECIFIED SPINAL OSTEOARTHRITIS COMPLICATION STATUS: ICD-10-CM

## 2021-06-15 DIAGNOSIS — M25.561 CHRONIC PAIN OF RIGHT KNEE: ICD-10-CM

## 2021-06-15 DIAGNOSIS — G89.4 CHRONIC PAIN SYNDROME: ICD-10-CM

## 2021-06-15 DIAGNOSIS — S83.511A RUPTURE OF ANTERIOR CRUCIATE LIGAMENT OF RIGHT KNEE, INITIAL ENCOUNTER: ICD-10-CM

## 2021-06-15 DIAGNOSIS — S83.241A TEAR OF MEDIAL MENISCUS OF RIGHT KNEE, UNSPECIFIED TEAR TYPE, UNSPECIFIED WHETHER OLD OR CURRENT TEAR, INITIAL ENCOUNTER: ICD-10-CM

## 2021-06-15 DIAGNOSIS — L50.9 HIVES: ICD-10-CM

## 2021-06-15 DIAGNOSIS — G89.29 CHRONIC PAIN OF RIGHT KNEE: ICD-10-CM

## 2021-06-15 RX ORDER — TRAMADOL HYDROCHLORIDE 50 MG/1
TABLET ORAL
Qty: 240 TABLET | Refills: 0 | Status: SHIPPED | OUTPATIENT
Start: 2021-06-15 | End: 2021-07-09

## 2021-06-15 NOTE — TELEPHONE ENCOUNTER
Eventually we will may need to wean her off tramadol. For now we will give directions such as ordered.

## 2021-06-15 NOTE — TELEPHONE ENCOUNTER
Name from pharmacy: TRAMADOL HCL 50MG TABLET          Will file in chart as: TRAMADOL HCL 50 MG Oral Tab    Sig: Take 1-2 tablets ( mg total) by mouth every 4 to 6 hours as needed for Pain.     Disp:  240 tablet    Refills:  0 (Pharmacy requested: Not

## 2021-06-15 NOTE — TELEPHONE ENCOUNTER
Pharmacist wanted to notify Dr. Claudia Sanchez that pt is taking Tramadol 50 mg, 2 tablets every 4 hours, so 12 tablets a day due to increased pain from recent surgery.  Pharmacist asking if this is ok, this writer informed her that it is within pt prescription i

## 2021-06-16 ENCOUNTER — TELEPHONE (OUTPATIENT)
Dept: FAMILY MEDICINE CLINIC | Facility: CLINIC | Age: 51
End: 2021-06-16

## 2021-06-17 ENCOUNTER — OFFICE VISIT (OUTPATIENT)
Dept: FAMILY MEDICINE CLINIC | Facility: CLINIC | Age: 51
End: 2021-06-17
Payer: COMMERCIAL

## 2021-06-17 VITALS
HEART RATE: 90 BPM | RESPIRATION RATE: 16 BRPM | TEMPERATURE: 97 F | SYSTOLIC BLOOD PRESSURE: 126 MMHG | WEIGHT: 257 LBS | OXYGEN SATURATION: 99 % | BODY MASS INDEX: 38.06 KG/M2 | HEIGHT: 69 IN | DIASTOLIC BLOOD PRESSURE: 88 MMHG

## 2021-06-17 DIAGNOSIS — M25.561 CHRONIC PAIN OF RIGHT KNEE: ICD-10-CM

## 2021-06-17 DIAGNOSIS — S83.241A TEAR OF MEDIAL MENISCUS OF RIGHT KNEE, UNSPECIFIED TEAR TYPE, UNSPECIFIED WHETHER OLD OR CURRENT TEAR, INITIAL ENCOUNTER: ICD-10-CM

## 2021-06-17 DIAGNOSIS — T78.40XD ALLERGY, SUBSEQUENT ENCOUNTER: Primary | ICD-10-CM

## 2021-06-17 DIAGNOSIS — G89.29 OTHER CHRONIC PAIN: ICD-10-CM

## 2021-06-17 DIAGNOSIS — L50.9 HIVES: ICD-10-CM

## 2021-06-17 DIAGNOSIS — M47.816 OSTEOARTHRITIS OF LUMBAR SPINE, UNSPECIFIED SPINAL OSTEOARTHRITIS COMPLICATION STATUS: ICD-10-CM

## 2021-06-17 DIAGNOSIS — G89.29 CHRONIC PAIN OF RIGHT KNEE: ICD-10-CM

## 2021-06-17 DIAGNOSIS — S83.511A RUPTURE OF ANTERIOR CRUCIATE LIGAMENT OF RIGHT KNEE, INITIAL ENCOUNTER: ICD-10-CM

## 2021-06-17 DIAGNOSIS — G89.4 CHRONIC PAIN SYNDROME: ICD-10-CM

## 2021-06-17 PROCEDURE — 3008F BODY MASS INDEX DOCD: CPT | Performed by: FAMILY MEDICINE

## 2021-06-17 PROCEDURE — 3079F DIAST BP 80-89 MM HG: CPT | Performed by: FAMILY MEDICINE

## 2021-06-17 PROCEDURE — 3074F SYST BP LT 130 MM HG: CPT | Performed by: FAMILY MEDICINE

## 2021-06-17 PROCEDURE — 99214 OFFICE O/P EST MOD 30 MIN: CPT | Performed by: FAMILY MEDICINE

## 2021-06-17 RX ORDER — PREDNISONE 50 MG/1
50 TABLET ORAL AS NEEDED
COMMUNITY

## 2021-06-17 RX ORDER — ALBUTEROL SULFATE 90 UG/1
2 AEROSOL, METERED RESPIRATORY (INHALATION) EVERY 6 HOURS PRN
Qty: 3 EACH | Refills: 1 | Status: SHIPPED | OUTPATIENT
Start: 2021-06-17

## 2021-06-17 RX ORDER — HYDROCODONE BITARTRATE AND ACETAMINOPHEN 10; 325 MG/1; MG/1
1-2 TABLET ORAL
Qty: 90 TABLET | Refills: 0 | Status: SHIPPED | OUTPATIENT
Start: 2021-06-17 | End: 2021-06-28

## 2021-06-17 NOTE — PATIENT INSTRUCTIONS
Thank you for choosing Umesh Pardo MD at [place] [address]  To Do: Wing Starch  1. Please take meds as directed. [facility] is located in [address]. Monday, Tuesday & Friday – 8 a.m. to 4 p.m.   Wednesday, Thursday – 7 a.m. to 3 outweigh those potential risks and we strive to make you healthier and to improve your quality of life.     Referrals, and Radiology Information:    If your insurance requires a referral to a specialist, please allow 5 business days to process your referral

## 2021-06-17 NOTE — PROGRESS NOTES
HPI/Subjective:   Patient ID: Jewels Chacko is a 53year old female. HPI  Ms. Freida Ahumada is a pleasant 41-year-old female with history of chronic pain syndrome secondary to lumbar degenerative joint disease, status post right knee arthroscopy for t for Anxiety. 30 tablet 0   • Mupirocin Calcium 2 % External Cream Apply 1 Application topically daily. 30 g 3   • Fexofenadine HCl 180 MG Oral Tab Take 180 mg by mouth daily. • Naloxone HCl 4 MG/0.1ML Nasal Liquid 4 mg by Nasal route as needed.  If shruthi Palpations: Abdomen is soft. Tenderness: There is no abdominal tenderness. Musculoskeletal:      Cervical back: Neck supple. Right lower leg: No edema. Left lower leg: No edema. Lymphadenopathy:      Cervical: No cervical adenopathy.

## 2021-06-28 ENCOUNTER — OFFICE VISIT (OUTPATIENT)
Dept: FAMILY MEDICINE CLINIC | Facility: CLINIC | Age: 51
End: 2021-06-28
Payer: COMMERCIAL

## 2021-06-28 VITALS
DIASTOLIC BLOOD PRESSURE: 70 MMHG | HEART RATE: 96 BPM | HEIGHT: 69 IN | SYSTOLIC BLOOD PRESSURE: 112 MMHG | OXYGEN SATURATION: 98 % | RESPIRATION RATE: 16 BRPM | TEMPERATURE: 97 F | BODY MASS INDEX: 37.47 KG/M2 | WEIGHT: 253 LBS

## 2021-06-28 DIAGNOSIS — S83.511A RUPTURE OF ANTERIOR CRUCIATE LIGAMENT OF RIGHT KNEE, INITIAL ENCOUNTER: ICD-10-CM

## 2021-06-28 DIAGNOSIS — M79.672 PAIN OF LEFT HEEL: Primary | ICD-10-CM

## 2021-06-28 DIAGNOSIS — M47.816 OSTEOARTHRITIS OF LUMBAR SPINE, UNSPECIFIED SPINAL OSTEOARTHRITIS COMPLICATION STATUS: ICD-10-CM

## 2021-06-28 DIAGNOSIS — M25.561 CHRONIC PAIN OF RIGHT KNEE: ICD-10-CM

## 2021-06-28 DIAGNOSIS — L50.9 HIVES: ICD-10-CM

## 2021-06-28 DIAGNOSIS — G89.4 CHRONIC PAIN SYNDROME: ICD-10-CM

## 2021-06-28 DIAGNOSIS — G89.29 CHRONIC PAIN OF RIGHT KNEE: ICD-10-CM

## 2021-06-28 DIAGNOSIS — G89.29 OTHER CHRONIC PAIN: ICD-10-CM

## 2021-06-28 DIAGNOSIS — S83.241A TEAR OF MEDIAL MENISCUS OF RIGHT KNEE, UNSPECIFIED TEAR TYPE, UNSPECIFIED WHETHER OLD OR CURRENT TEAR, INITIAL ENCOUNTER: ICD-10-CM

## 2021-06-28 PROCEDURE — 3078F DIAST BP <80 MM HG: CPT | Performed by: FAMILY MEDICINE

## 2021-06-28 PROCEDURE — 3074F SYST BP LT 130 MM HG: CPT | Performed by: FAMILY MEDICINE

## 2021-06-28 PROCEDURE — 3008F BODY MASS INDEX DOCD: CPT | Performed by: FAMILY MEDICINE

## 2021-06-28 PROCEDURE — 99214 OFFICE O/P EST MOD 30 MIN: CPT | Performed by: FAMILY MEDICINE

## 2021-06-28 RX ORDER — PREDNISONE 1 MG/1
0.5 TABLET ORAL
COMMUNITY

## 2021-06-28 RX ORDER — HYDROCODONE BITARTRATE AND ACETAMINOPHEN 10; 325 MG/1; MG/1
1-2 TABLET ORAL
Qty: 90 TABLET | Refills: 0 | Status: SHIPPED | OUTPATIENT
Start: 2021-06-28 | End: 2021-07-22

## 2021-06-28 NOTE — PATIENT INSTRUCTIONS
Thank you for choosing Radha Ann MD at [place] [address]  To Do: Edith Jara  1. Please take meds as directed. [facility] is located in [address]. Monday, Tuesday & Friday – 8 a.m. to 4 p.m.   Wednesday, Thursday – 7 a.m. to 3 outweigh those potential risks and we strive to make you healthier and to improve your quality of life.     Referrals, and Radiology Information:    If your insurance requires a referral to a specialist, please allow 5 business days to process your referral

## 2021-06-28 NOTE — PROGRESS NOTES
HPI/Subjective:   Patient ID: Jose Stokes is a 53year old female. HPI  Ms. Rigo Olson is a pleasant 52-year-old female with history of chronic pain syndrome secondary to lumbar degenerative joint disease, status post right knee arthroscopy for t Calcium 2 % External Cream Apply 1 Application topically daily. 30 g 3   • Fexofenadine HCl 180 MG Oral Tab Take 180 mg by mouth daily. • Naloxone HCl 4 MG/0.1ML Nasal Liquid 4 mg by Nasal route as needed.  If patient remains unresponsive, repeat dose i syndrome  Hives  Rupture of anterior cruciate ligament of right knee, initial encounter  -Left heel pain likely secondary to plantar fasciitis; he wears orthotics but agree with prednisone  –She said that she had run out of her Norco early.   I did refill t

## 2021-07-08 DIAGNOSIS — G89.29 CHRONIC PAIN OF RIGHT KNEE: ICD-10-CM

## 2021-07-08 DIAGNOSIS — S83.241A TEAR OF MEDIAL MENISCUS OF RIGHT KNEE, UNSPECIFIED TEAR TYPE, UNSPECIFIED WHETHER OLD OR CURRENT TEAR, INITIAL ENCOUNTER: ICD-10-CM

## 2021-07-08 DIAGNOSIS — M25.561 CHRONIC PAIN OF RIGHT KNEE: ICD-10-CM

## 2021-07-08 DIAGNOSIS — M47.816 OSTEOARTHRITIS OF LUMBAR SPINE, UNSPECIFIED SPINAL OSTEOARTHRITIS COMPLICATION STATUS: ICD-10-CM

## 2021-07-08 DIAGNOSIS — S83.511A RUPTURE OF ANTERIOR CRUCIATE LIGAMENT OF RIGHT KNEE, INITIAL ENCOUNTER: ICD-10-CM

## 2021-07-08 DIAGNOSIS — L50.9 HIVES: ICD-10-CM

## 2021-07-08 DIAGNOSIS — G89.4 CHRONIC PAIN SYNDROME: ICD-10-CM

## 2021-07-08 NOTE — TELEPHONE ENCOUNTER
Requesting Tramadol 50mg  LOV: 6/28/21  RTC: prn  Last Relevant Labs: 9/24/19  Filled: 6/15/21 #240 with 0 refills    No future appointments.     [place] 6/16/21 #240 (20 day supply)    Rx pended and routed for approval/denial

## 2021-07-09 RX ORDER — TRAMADOL HYDROCHLORIDE 50 MG/1
TABLET ORAL
Qty: 240 TABLET | Refills: 0 | Status: SHIPPED | OUTPATIENT
Start: 2021-07-15 | End: 2021-07-29

## 2021-07-22 DIAGNOSIS — G89.29 CHRONIC PAIN OF RIGHT KNEE: ICD-10-CM

## 2021-07-22 DIAGNOSIS — G89.4 CHRONIC PAIN SYNDROME: ICD-10-CM

## 2021-07-22 DIAGNOSIS — M47.816 OSTEOARTHRITIS OF LUMBAR SPINE, UNSPECIFIED SPINAL OSTEOARTHRITIS COMPLICATION STATUS: ICD-10-CM

## 2021-07-22 DIAGNOSIS — S83.511A RUPTURE OF ANTERIOR CRUCIATE LIGAMENT OF RIGHT KNEE, INITIAL ENCOUNTER: ICD-10-CM

## 2021-07-22 DIAGNOSIS — G89.29 OTHER CHRONIC PAIN: ICD-10-CM

## 2021-07-22 DIAGNOSIS — S83.241A TEAR OF MEDIAL MENISCUS OF RIGHT KNEE, UNSPECIFIED TEAR TYPE, UNSPECIFIED WHETHER OLD OR CURRENT TEAR, INITIAL ENCOUNTER: ICD-10-CM

## 2021-07-22 DIAGNOSIS — M25.561 CHRONIC PAIN OF RIGHT KNEE: ICD-10-CM

## 2021-07-22 DIAGNOSIS — L50.9 HIVES: ICD-10-CM

## 2021-07-22 RX ORDER — HYDROCODONE BITARTRATE AND ACETAMINOPHEN 10; 325 MG/1; MG/1
1-2 TABLET ORAL
Qty: 90 TABLET | Refills: 0 | Status: SHIPPED | OUTPATIENT
Start: 2021-07-28 | End: 2021-07-22

## 2021-07-22 RX ORDER — HYDROCODONE BITARTRATE AND ACETAMINOPHEN 10; 325 MG/1; MG/1
1-2 TABLET ORAL
Qty: 90 TABLET | Refills: 0 | Status: SHIPPED | OUTPATIENT
Start: 2021-07-22 | End: 2021-08-18

## 2021-07-22 NOTE — TELEPHONE ENCOUNTER
Rx had incorrect start date, Rx pended for your review and signature, previous Rx will be cancelled.      Podiatry referral for pt?

## 2021-07-22 NOTE — TELEPHONE ENCOUNTER
Patient states Jacob Luciano has to be refilled at [facility]. All other medications are filled at primary pharmacy.  Asking if prescription can be transferred, please advise

## 2021-07-22 NOTE — TELEPHONE ENCOUNTER
Patient calling for refill on [place], has enough medication through Monday. Calling ahead since provider will be out of office. Patient also requesting referral to Podiatry, discussed at last OV. Heel pain still there, will like to get evaluated.  Please adv

## 2021-07-25 RX ORDER — CYCLOBENZAPRINE HCL 10 MG
TABLET ORAL
Qty: 30 TABLET | Refills: 5 | Status: SHIPPED | OUTPATIENT
Start: 2021-07-25 | End: 2021-10-20

## 2021-07-29 DIAGNOSIS — M25.561 CHRONIC PAIN OF RIGHT KNEE: ICD-10-CM

## 2021-07-29 DIAGNOSIS — G89.4 CHRONIC PAIN SYNDROME: ICD-10-CM

## 2021-07-29 DIAGNOSIS — L50.9 HIVES: ICD-10-CM

## 2021-07-29 DIAGNOSIS — G89.29 CHRONIC PAIN OF RIGHT KNEE: ICD-10-CM

## 2021-07-29 DIAGNOSIS — S83.511A RUPTURE OF ANTERIOR CRUCIATE LIGAMENT OF RIGHT KNEE, INITIAL ENCOUNTER: ICD-10-CM

## 2021-07-29 DIAGNOSIS — S83.241A TEAR OF MEDIAL MENISCUS OF RIGHT KNEE, UNSPECIFIED TEAR TYPE, UNSPECIFIED WHETHER OLD OR CURRENT TEAR, INITIAL ENCOUNTER: ICD-10-CM

## 2021-07-29 DIAGNOSIS — M47.816 OSTEOARTHRITIS OF LUMBAR SPINE, UNSPECIFIED SPINAL OSTEOARTHRITIS COMPLICATION STATUS: ICD-10-CM

## 2021-07-29 RX ORDER — TRAMADOL HYDROCHLORIDE 50 MG/1
TABLET ORAL
Qty: 240 TABLET | Refills: 0 | Status: SHIPPED | OUTPATIENT
Start: 2021-08-14 | End: 2021-08-23

## 2021-07-29 NOTE — TELEPHONE ENCOUNTER
Requesting Tramadol 50mg  LOV: 6/28/21  RTC: prn  Last Relevant Labs: 9/24/19  Filled: 7/9/21 #240 with 0 refills    No future appointments.     [place] , last dispensed 7/9/21 #240 (20 day supply)    Rx pended and routed for approval/denial

## 2021-08-18 DIAGNOSIS — L50.9 HIVES: ICD-10-CM

## 2021-08-18 DIAGNOSIS — M25.561 CHRONIC PAIN OF RIGHT KNEE: ICD-10-CM

## 2021-08-18 DIAGNOSIS — S83.511A RUPTURE OF ANTERIOR CRUCIATE LIGAMENT OF RIGHT KNEE, INITIAL ENCOUNTER: ICD-10-CM

## 2021-08-18 DIAGNOSIS — S83.241A TEAR OF MEDIAL MENISCUS OF RIGHT KNEE, UNSPECIFIED TEAR TYPE, UNSPECIFIED WHETHER OLD OR CURRENT TEAR, INITIAL ENCOUNTER: ICD-10-CM

## 2021-08-18 DIAGNOSIS — G89.29 CHRONIC PAIN OF RIGHT KNEE: ICD-10-CM

## 2021-08-18 DIAGNOSIS — G89.29 OTHER CHRONIC PAIN: ICD-10-CM

## 2021-08-18 DIAGNOSIS — G89.4 CHRONIC PAIN SYNDROME: ICD-10-CM

## 2021-08-18 DIAGNOSIS — M47.816 OSTEOARTHRITIS OF LUMBAR SPINE, UNSPECIFIED SPINAL OSTEOARTHRITIS COMPLICATION STATUS: ICD-10-CM

## 2021-08-18 RX ORDER — HYDROCODONE BITARTRATE AND ACETAMINOPHEN 10; 325 MG/1; MG/1
1-2 TABLET ORAL
Qty: 90 TABLET | Refills: 0 | Status: SHIPPED | OUTPATIENT
Start: 2021-08-18 | End: 2021-09-09

## 2021-08-18 NOTE — TELEPHONE ENCOUNTER
Requesting Norco 10/325mg  LOV: 6/28/21  RTC: prn  Last Relevant Labs: 9/24/19  Filled: 7/22/21 #90 with 0 refills    No future appointments.     [place] , last dispensed 7/22/21 #90    Rx pended and routed for approval/denial

## 2021-08-18 NOTE — TELEPHONE ENCOUNTER
Patient calling for monthly [place] refill, please send to [facility] in [place].  Please advise

## 2021-08-23 DIAGNOSIS — M25.561 CHRONIC PAIN OF RIGHT KNEE: ICD-10-CM

## 2021-08-23 DIAGNOSIS — L50.9 HIVES: ICD-10-CM

## 2021-08-23 DIAGNOSIS — G89.4 CHRONIC PAIN SYNDROME: ICD-10-CM

## 2021-08-23 DIAGNOSIS — S83.511A RUPTURE OF ANTERIOR CRUCIATE LIGAMENT OF RIGHT KNEE, INITIAL ENCOUNTER: ICD-10-CM

## 2021-08-23 DIAGNOSIS — G89.29 CHRONIC PAIN OF RIGHT KNEE: ICD-10-CM

## 2021-08-23 DIAGNOSIS — M47.816 OSTEOARTHRITIS OF LUMBAR SPINE, UNSPECIFIED SPINAL OSTEOARTHRITIS COMPLICATION STATUS: ICD-10-CM

## 2021-08-23 DIAGNOSIS — S83.241A TEAR OF MEDIAL MENISCUS OF RIGHT KNEE, UNSPECIFIED TEAR TYPE, UNSPECIFIED WHETHER OLD OR CURRENT TEAR, INITIAL ENCOUNTER: ICD-10-CM

## 2021-08-23 RX ORDER — TRAMADOL HYDROCHLORIDE 50 MG/1
TABLET ORAL
Qty: 240 TABLET | Refills: 0 | Status: SHIPPED | OUTPATIENT
Start: 2021-08-23 | End: 2021-09-14

## 2021-09-09 DIAGNOSIS — S83.241A TEAR OF MEDIAL MENISCUS OF RIGHT KNEE, UNSPECIFIED TEAR TYPE, UNSPECIFIED WHETHER OLD OR CURRENT TEAR, INITIAL ENCOUNTER: ICD-10-CM

## 2021-09-09 DIAGNOSIS — M47.816 OSTEOARTHRITIS OF LUMBAR SPINE, UNSPECIFIED SPINAL OSTEOARTHRITIS COMPLICATION STATUS: ICD-10-CM

## 2021-09-09 DIAGNOSIS — G89.29 CHRONIC PAIN OF RIGHT KNEE: ICD-10-CM

## 2021-09-09 DIAGNOSIS — L50.9 HIVES: ICD-10-CM

## 2021-09-09 DIAGNOSIS — S83.511A RUPTURE OF ANTERIOR CRUCIATE LIGAMENT OF RIGHT KNEE, INITIAL ENCOUNTER: ICD-10-CM

## 2021-09-09 DIAGNOSIS — G89.4 CHRONIC PAIN SYNDROME: ICD-10-CM

## 2021-09-09 DIAGNOSIS — G89.29 OTHER CHRONIC PAIN: ICD-10-CM

## 2021-09-09 DIAGNOSIS — M25.561 CHRONIC PAIN OF RIGHT KNEE: ICD-10-CM

## 2021-09-09 RX ORDER — HYDROCODONE BITARTRATE AND ACETAMINOPHEN 10; 325 MG/1; MG/1
1-2 TABLET ORAL
Qty: 90 TABLET | Refills: 0 | Status: SHIPPED | OUTPATIENT
Start: 2021-09-09 | End: 2021-10-02

## 2021-09-09 NOTE — TELEPHONE ENCOUNTER
Medication Quantity Refills Start End   HYDROcodone-acetaminophen (NORCO)  MG Oral Tab 90 tablet 0 8/18/2021 9/17/2021   Sig:   Take 1-2 tablets by mouth every 4 to 6 hours as needed for Pain.  Discontinue Percocet     Route:   Oral       Last OV 6/28

## 2021-09-14 DIAGNOSIS — M47.816 OSTEOARTHRITIS OF LUMBAR SPINE, UNSPECIFIED SPINAL OSTEOARTHRITIS COMPLICATION STATUS: ICD-10-CM

## 2021-09-14 DIAGNOSIS — M25.561 CHRONIC PAIN OF RIGHT KNEE: ICD-10-CM

## 2021-09-14 DIAGNOSIS — L50.9 HIVES: ICD-10-CM

## 2021-09-14 DIAGNOSIS — S83.511A RUPTURE OF ANTERIOR CRUCIATE LIGAMENT OF RIGHT KNEE, INITIAL ENCOUNTER: ICD-10-CM

## 2021-09-14 DIAGNOSIS — G89.4 CHRONIC PAIN SYNDROME: ICD-10-CM

## 2021-09-14 DIAGNOSIS — S83.241A TEAR OF MEDIAL MENISCUS OF RIGHT KNEE, UNSPECIFIED TEAR TYPE, UNSPECIFIED WHETHER OLD OR CURRENT TEAR, INITIAL ENCOUNTER: ICD-10-CM

## 2021-09-14 DIAGNOSIS — G89.29 CHRONIC PAIN OF RIGHT KNEE: ICD-10-CM

## 2021-09-14 RX ORDER — TRAMADOL HYDROCHLORIDE 50 MG/1
TABLET ORAL
Qty: 240 TABLET | Refills: 0 | Status: SHIPPED | OUTPATIENT
Start: 2021-09-14 | End: 2021-10-07

## 2021-10-01 DIAGNOSIS — S83.511A RUPTURE OF ANTERIOR CRUCIATE LIGAMENT OF RIGHT KNEE, INITIAL ENCOUNTER: ICD-10-CM

## 2021-10-01 DIAGNOSIS — G89.29 OTHER CHRONIC PAIN: ICD-10-CM

## 2021-10-01 DIAGNOSIS — L50.9 HIVES: ICD-10-CM

## 2021-10-01 DIAGNOSIS — G89.4 CHRONIC PAIN SYNDROME: ICD-10-CM

## 2021-10-01 DIAGNOSIS — M25.561 CHRONIC PAIN OF RIGHT KNEE: ICD-10-CM

## 2021-10-01 DIAGNOSIS — S83.241A TEAR OF MEDIAL MENISCUS OF RIGHT KNEE, UNSPECIFIED TEAR TYPE, UNSPECIFIED WHETHER OLD OR CURRENT TEAR, INITIAL ENCOUNTER: ICD-10-CM

## 2021-10-01 DIAGNOSIS — M47.816 OSTEOARTHRITIS OF LUMBAR SPINE, UNSPECIFIED SPINAL OSTEOARTHRITIS COMPLICATION STATUS: ICD-10-CM

## 2021-10-01 DIAGNOSIS — G89.29 CHRONIC PAIN OF RIGHT KNEE: ICD-10-CM

## 2021-10-01 NOTE — TELEPHONE ENCOUNTER
Requesting Norco 10/325mg  LOV: 6/28/21  RTC: prn  Last Relevant Labs: 9/24/19  Filled: 9/9/21 #90 with 0 refills    No future appointments.     [place] , last dispensed 9/9/21 #90    Rx pended and routed for approval/denial

## 2021-10-02 RX ORDER — HYDROCODONE BITARTRATE AND ACETAMINOPHEN 10; 325 MG/1; MG/1
1-2 TABLET ORAL
Qty: 90 TABLET | Refills: 0 | Status: SHIPPED | OUTPATIENT
Start: 2021-10-02 | End: 2021-10-20

## 2021-10-06 DIAGNOSIS — S83.511A RUPTURE OF ANTERIOR CRUCIATE LIGAMENT OF RIGHT KNEE, INITIAL ENCOUNTER: ICD-10-CM

## 2021-10-06 DIAGNOSIS — M47.816 OSTEOARTHRITIS OF LUMBAR SPINE, UNSPECIFIED SPINAL OSTEOARTHRITIS COMPLICATION STATUS: ICD-10-CM

## 2021-10-06 DIAGNOSIS — M25.561 CHRONIC PAIN OF RIGHT KNEE: ICD-10-CM

## 2021-10-06 DIAGNOSIS — L50.9 HIVES: ICD-10-CM

## 2021-10-06 DIAGNOSIS — G89.29 CHRONIC PAIN OF RIGHT KNEE: ICD-10-CM

## 2021-10-06 DIAGNOSIS — G89.4 CHRONIC PAIN SYNDROME: ICD-10-CM

## 2021-10-06 DIAGNOSIS — S83.241A TEAR OF MEDIAL MENISCUS OF RIGHT KNEE, UNSPECIFIED TEAR TYPE, UNSPECIFIED WHETHER OLD OR CURRENT TEAR, INITIAL ENCOUNTER: ICD-10-CM

## 2021-10-07 RX ORDER — TRAMADOL HYDROCHLORIDE 50 MG/1
TABLET ORAL
Qty: 240 TABLET | Refills: 0 | Status: SHIPPED | OUTPATIENT
Start: 2021-10-07 | End: 2021-10-20

## 2021-10-18 ENCOUNTER — TELEPHONE (OUTPATIENT)
Dept: FAMILY MEDICINE CLINIC | Facility: CLINIC | Age: 51
End: 2021-10-18

## 2021-10-18 NOTE — TELEPHONE ENCOUNTER
Requesting Norco 10/325mg  LOV: 6/28/21  RTC: prn  Last Relevant Labs: 9/24/19  Filled: 10/2/21 #90 with 0 refills    Future Appointments   Date Time Provider Miguel Nguyen   10/20/2021  2:30 PM Ray Lombard, MD [facility] [address] [facility]     Per [place] , l

## 2021-10-18 NOTE — TELEPHONE ENCOUNTER
- Pt has scheduled an appointment and would like to know if you can send rx request for [place] to pharmacy?     Future Appointments   Date Time Provider Miguel Nguyen   10/20/2021  2:30 PM Verónica Duff MD [facility] [address]     [facility]

## 2021-10-20 ENCOUNTER — OFFICE VISIT (OUTPATIENT)
Dept: FAMILY MEDICINE CLINIC | Facility: CLINIC | Age: 51
End: 2021-10-20
Payer: COMMERCIAL

## 2021-10-20 VITALS
RESPIRATION RATE: 16 BRPM | WEIGHT: 244 LBS | HEIGHT: 69 IN | BODY MASS INDEX: 36.14 KG/M2 | HEART RATE: 109 BPM | SYSTOLIC BLOOD PRESSURE: 142 MMHG | TEMPERATURE: 98 F | DIASTOLIC BLOOD PRESSURE: 90 MMHG

## 2021-10-20 DIAGNOSIS — M47.816 OSTEOARTHRITIS OF LUMBAR SPINE, UNSPECIFIED SPINAL OSTEOARTHRITIS COMPLICATION STATUS: Primary | ICD-10-CM

## 2021-10-20 DIAGNOSIS — M25.561 CHRONIC PAIN OF RIGHT KNEE: ICD-10-CM

## 2021-10-20 DIAGNOSIS — S83.241A TEAR OF MEDIAL MENISCUS OF RIGHT KNEE, UNSPECIFIED TEAR TYPE, UNSPECIFIED WHETHER OLD OR CURRENT TEAR, INITIAL ENCOUNTER: ICD-10-CM

## 2021-10-20 DIAGNOSIS — G89.29 OTHER CHRONIC PAIN: ICD-10-CM

## 2021-10-20 DIAGNOSIS — S83.511A RUPTURE OF ANTERIOR CRUCIATE LIGAMENT OF RIGHT KNEE, INITIAL ENCOUNTER: ICD-10-CM

## 2021-10-20 DIAGNOSIS — Z23 NEED FOR VACCINATION: ICD-10-CM

## 2021-10-20 DIAGNOSIS — G89.29 CHRONIC PAIN OF RIGHT KNEE: ICD-10-CM

## 2021-10-20 DIAGNOSIS — M25.50 ARTHRALGIA, UNSPECIFIED JOINT: ICD-10-CM

## 2021-10-20 DIAGNOSIS — G89.4 CHRONIC PAIN SYNDROME: ICD-10-CM

## 2021-10-20 DIAGNOSIS — L50.9 HIVES: ICD-10-CM

## 2021-10-20 PROCEDURE — 3080F DIAST BP >= 90 MM HG: CPT | Performed by: FAMILY MEDICINE

## 2021-10-20 PROCEDURE — 3077F SYST BP >= 140 MM HG: CPT | Performed by: FAMILY MEDICINE

## 2021-10-20 PROCEDURE — 3008F BODY MASS INDEX DOCD: CPT | Performed by: FAMILY MEDICINE

## 2021-10-20 PROCEDURE — 99214 OFFICE O/P EST MOD 30 MIN: CPT | Performed by: FAMILY MEDICINE

## 2021-10-20 RX ORDER — HYDROCODONE BITARTRATE AND ACETAMINOPHEN 10; 325 MG/1; MG/1
TABLET ORAL
COMMUNITY

## 2021-10-20 RX ORDER — FEXOFENADINE HCL 180 MG/1
TABLET ORAL
COMMUNITY

## 2021-10-20 RX ORDER — OXYCODONE HYDROCHLORIDE AND ACETAMINOPHEN 5; 325 MG/1; MG/1
1 TABLET ORAL AS DIRECTED
COMMUNITY
Start: 2021-05-19 | End: 2021-10-20

## 2021-10-20 RX ORDER — HYDROCODONE BITARTRATE AND ACETAMINOPHEN 10; 325 MG/1; MG/1
1-2 TABLET ORAL
Qty: 90 TABLET | Refills: 0 | Status: SHIPPED | OUTPATIENT
Start: 2021-10-20 | End: 2021-11-10

## 2021-10-20 RX ORDER — CYCLOBENZAPRINE HCL 10 MG
10 TABLET ORAL NIGHTLY PRN
Qty: 30 TABLET | Refills: 5 | Status: SHIPPED | OUTPATIENT
Start: 2021-10-20

## 2021-10-20 RX ORDER — TRAMADOL HYDROCHLORIDE 50 MG/1
TABLET ORAL
Qty: 240 TABLET | Refills: 0 | Status: SHIPPED | OUTPATIENT
Start: 2021-10-20 | End: 2021-11-10

## 2021-10-20 NOTE — PROGRESS NOTES
Subjective:   Patient ID: Isabela Tan is a 49year old female. HPI  Ms. Miguelangel Fields is a pleasant 27-year-old female with history of chronic pain syndrome secondary to lumbar degenerative joint disease, status post right knee arthroscopy for torn TABLET BY MOUTH ONE TIME DAILY POST OPERATIVELY     • LORazepam 1 MG Oral Tab Take 1 tablet (1 mg total) by mouth 2 (two) times daily as needed for Anxiety. 30 tablet 0   • Fexofenadine HCl 180 MG Oral Tab Take 180 mg by mouth daily.      • Naloxone HCl 4 M Abdominal:      General: Bowel sounds are normal. There is no distension. Palpations: Abdomen is soft. There is no mass. Tenderness: There is no abdominal tenderness. Musculoskeletal:      Lumbar back: No spasms or tenderness.       Right lowe HYDROcodone-acetaminophen (NORCO)  MG Oral Tab 90 tablet 0     Sig: Take 1-2 tablets by mouth every 4 to 6 hours as needed for Pain.        Imaging & Referrals:  FLULAVAL INFLUENZA VACCINE QUAD PRESERVATIVE FREE 0.5 ML

## 2021-10-20 NOTE — PATIENT INSTRUCTIONS
Thank you for choosing Da Prakash MD at [place] [address]  To Do: Mark Haro  1. Please take meds as directed. [facility] is located in [address]. Monday, Tuesday & Friday – 8 a.m. to 4 p.m.   Wednesday, Thursday – 7 a.m. to 3 outweigh those potential risks and we strive to make you healthier and to improve your quality of life.     Referrals, and Radiology Information:    If your insurance requires a referral to a specialist, please allow 5 business days to process your referral

## 2021-11-04 ENCOUNTER — TELEPHONE (OUTPATIENT)
Dept: FAMILY MEDICINE CLINIC | Facility: CLINIC | Age: 51
End: 2021-11-04

## 2021-11-04 NOTE — TELEPHONE ENCOUNTER
Recd FMLA ppw from [employer] [facility] [employer] [place] [employer] to be completed by PCP. Form placed in physician folder.    Future Appointments   Date Time Provider Miguel Wendy   11/10/2021  1:30 PM Brett Gonzalez MD [facility] [address] [facility] [address]

## 2021-11-10 ENCOUNTER — OFFICE VISIT (OUTPATIENT)
Dept: FAMILY MEDICINE CLINIC | Facility: CLINIC | Age: 51
End: 2021-11-10
Payer: COMMERCIAL

## 2021-11-10 VITALS
TEMPERATURE: 98 F | HEART RATE: 80 BPM | DIASTOLIC BLOOD PRESSURE: 74 MMHG | RESPIRATION RATE: 16 BRPM | BODY MASS INDEX: 35.99 KG/M2 | WEIGHT: 243 LBS | HEIGHT: 69 IN | OXYGEN SATURATION: 99 % | SYSTOLIC BLOOD PRESSURE: 110 MMHG

## 2021-11-10 DIAGNOSIS — G89.4 CHRONIC PAIN SYNDROME: ICD-10-CM

## 2021-11-10 DIAGNOSIS — S83.511A RUPTURE OF ANTERIOR CRUCIATE LIGAMENT OF RIGHT KNEE, INITIAL ENCOUNTER: ICD-10-CM

## 2021-11-10 DIAGNOSIS — G89.29 OTHER CHRONIC PAIN: ICD-10-CM

## 2021-11-10 DIAGNOSIS — S83.241A TEAR OF MEDIAL MENISCUS OF RIGHT KNEE, UNSPECIFIED TEAR TYPE, UNSPECIFIED WHETHER OLD OR CURRENT TEAR, INITIAL ENCOUNTER: ICD-10-CM

## 2021-11-10 DIAGNOSIS — L50.9 HIVES: Primary | ICD-10-CM

## 2021-11-10 DIAGNOSIS — M47.816 OSTEOARTHRITIS OF LUMBAR SPINE, UNSPECIFIED SPINAL OSTEOARTHRITIS COMPLICATION STATUS: ICD-10-CM

## 2021-11-10 DIAGNOSIS — G89.29 CHRONIC PAIN OF RIGHT KNEE: ICD-10-CM

## 2021-11-10 DIAGNOSIS — T78.3XXD ANGIOEDEMA, SUBSEQUENT ENCOUNTER: ICD-10-CM

## 2021-11-10 DIAGNOSIS — M25.561 CHRONIC PAIN OF RIGHT KNEE: ICD-10-CM

## 2021-11-10 PROCEDURE — 3074F SYST BP LT 130 MM HG: CPT | Performed by: FAMILY MEDICINE

## 2021-11-10 PROCEDURE — 3078F DIAST BP <80 MM HG: CPT | Performed by: FAMILY MEDICINE

## 2021-11-10 PROCEDURE — 3008F BODY MASS INDEX DOCD: CPT | Performed by: FAMILY MEDICINE

## 2021-11-10 PROCEDURE — 99214 OFFICE O/P EST MOD 30 MIN: CPT | Performed by: FAMILY MEDICINE

## 2021-11-10 RX ORDER — TRAMADOL HYDROCHLORIDE 50 MG/1
TABLET ORAL
Qty: 240 TABLET | Refills: 0 | Status: SHIPPED | OUTPATIENT
Start: 2021-11-10 | End: 2021-12-06

## 2021-11-10 RX ORDER — HYDROCODONE BITARTRATE AND ACETAMINOPHEN 10; 325 MG/1; MG/1
1-2 TABLET ORAL
Qty: 90 TABLET | Refills: 0 | Status: SHIPPED | OUTPATIENT
Start: 2021-11-10 | End: 2021-12-02

## 2021-11-10 NOTE — PATIENT INSTRUCTIONS
Thank you for choosing Elaina Richmond MD at [facility]  To Do: [facility] Damian  1. Please take meds as directed. [facility] is located in [address]. Monday, Tuesday & Friday – 8 a.m. to 4 p.m.   Wednesday, Thursday – 7 a.m. to 3 outweigh those potential risks and we strive to make you healthier and to improve your quality of life.     Referrals, and Radiology Information:    If your insurance requires a referral to a specialist, please allow 5 business days to process your referral changes. It can also disrupt work, sleep, relationships, and other aspects of normal life. It may not be possible to relieve all of your pain.  But it can be reduced to a level you can cope with.    Your role in treatment  Your healthcare provider will work usual  · Before activities that tend to trigger pain  · To decrease sensitivity to pain  Medicines for chronic pain include:  · Nonsteroidal anti-inflammatory medicines (NSAIDs) for pain from swelling and inflammation.  Your provider may prescribe a type of brain  · Spinal stimulation to block spinal pain  · Implanted spinal pump that contains pain medicine  · Ablation using heat, cold, or chemicals to destroy painful nerves                                                                                       group, contact your nearest hospital or pain clinic.    You may also want to try counseling. Counseling can help you learn coping skills and methods such as visualization. It can also help with mood problems.  When choosing a counselor, look for someone who

## 2021-11-10 NOTE — PROGRESS NOTES
Subjective:   Patient ID: Jewels Chacko is a 49year old female. HPI  Ms. Freida Ahumada is a pleasant 27-year-old female with history of chronic pain syndrome secondary to lumbar degenerative joint disease, status post right knee arthroscopy for torn Wheezing. 3 each 1   • aspirin  MG Oral Tab EC TAKE ONE TABLET BY MOUTH ONE TIME DAILY POST OPERATIVELY     • LORazepam 1 MG Oral Tab Take 1 tablet (1 mg total) by mouth 2 (two) times daily as needed for Anxiety.  30 tablet 0   • Fexofenadine HCl 180 distress. Breath sounds: Normal breath sounds. No wheezing. Abdominal:      General: Bowel sounds are normal. There is no distension. Palpations: Abdomen is soft. There is no mass. Tenderness: There is no abdominal tenderness.    Musculoske

## 2021-11-11 ENCOUNTER — TELEPHONE (OUTPATIENT)
Dept: FAMILY MEDICINE CLINIC | Facility: CLINIC | Age: 51
End: 2021-11-11

## 2021-11-17 RX ORDER — LORAZEPAM 1 MG/1
1 TABLET ORAL 2 TIMES DAILY PRN
Qty: 30 TABLET | Refills: 0 | Status: SHIPPED | OUTPATIENT
Start: 2021-11-17

## 2021-12-02 DIAGNOSIS — G89.4 CHRONIC PAIN SYNDROME: ICD-10-CM

## 2021-12-02 DIAGNOSIS — S83.511A RUPTURE OF ANTERIOR CRUCIATE LIGAMENT OF RIGHT KNEE, INITIAL ENCOUNTER: ICD-10-CM

## 2021-12-02 DIAGNOSIS — L50.9 HIVES: ICD-10-CM

## 2021-12-02 DIAGNOSIS — M25.561 CHRONIC PAIN OF RIGHT KNEE: ICD-10-CM

## 2021-12-02 DIAGNOSIS — M47.816 OSTEOARTHRITIS OF LUMBAR SPINE, UNSPECIFIED SPINAL OSTEOARTHRITIS COMPLICATION STATUS: ICD-10-CM

## 2021-12-02 DIAGNOSIS — G89.29 CHRONIC PAIN OF RIGHT KNEE: ICD-10-CM

## 2021-12-02 DIAGNOSIS — S83.241A TEAR OF MEDIAL MENISCUS OF RIGHT KNEE, UNSPECIFIED TEAR TYPE, UNSPECIFIED WHETHER OLD OR CURRENT TEAR, INITIAL ENCOUNTER: ICD-10-CM

## 2021-12-02 DIAGNOSIS — G89.29 OTHER CHRONIC PAIN: ICD-10-CM

## 2021-12-02 RX ORDER — HYDROCODONE BITARTRATE AND ACETAMINOPHEN 10; 325 MG/1; MG/1
1-2 TABLET ORAL
Qty: 90 TABLET | Refills: 0 | Status: SHIPPED | OUTPATIENT
Start: 2021-12-02 | End: 2021-12-02

## 2021-12-02 RX ORDER — HYDROCODONE BITARTRATE AND ACETAMINOPHEN 10; 325 MG/1; MG/1
1-2 TABLET ORAL
Qty: 90 TABLET | Refills: 0 | Status: SHIPPED | OUTPATIENT
Start: 2021-12-02 | End: 2021-12-17

## 2021-12-02 NOTE — TELEPHONE ENCOUNTER
Patient states her Denver Arias went to the wrong pharmacy, it needs to go to [facility]. She stated we made the mistake TODAY so it should be corrected TODAY because she is out TODAY. Insisting she gets her [address]

## 2021-12-02 NOTE — TELEPHONE ENCOUNTER
Medication Quantity Refills Start End   HYDROcodone-acetaminophen (NORCO)  MG Oral Tab 90 tablet 0 11/10/2021 12/10/2021     Last OV 10/20/21  No future appointments.     HYDROcodone-Acetaminophen     Dispensed Written Strength Quantity Refills Days S

## 2021-12-02 NOTE — TELEPHONE ENCOUNTER
- Pt is requesting refill. Declined appointment.     [facility] #[id number] - [address] & [address] [address] [facility], [phone], [phone]       Additional Information    [employer]ated [employer]   View [place]

## 2021-12-06 DIAGNOSIS — G89.4 CHRONIC PAIN SYNDROME: ICD-10-CM

## 2021-12-06 DIAGNOSIS — G89.29 CHRONIC PAIN OF RIGHT KNEE: ICD-10-CM

## 2021-12-06 DIAGNOSIS — S83.241A TEAR OF MEDIAL MENISCUS OF RIGHT KNEE, UNSPECIFIED TEAR TYPE, UNSPECIFIED WHETHER OLD OR CURRENT TEAR, INITIAL ENCOUNTER: ICD-10-CM

## 2021-12-06 DIAGNOSIS — M25.561 CHRONIC PAIN OF RIGHT KNEE: ICD-10-CM

## 2021-12-06 DIAGNOSIS — M47.816 OSTEOARTHRITIS OF LUMBAR SPINE, UNSPECIFIED SPINAL OSTEOARTHRITIS COMPLICATION STATUS: ICD-10-CM

## 2021-12-06 DIAGNOSIS — S83.511A RUPTURE OF ANTERIOR CRUCIATE LIGAMENT OF RIGHT KNEE, INITIAL ENCOUNTER: ICD-10-CM

## 2021-12-06 DIAGNOSIS — L50.9 HIVES: ICD-10-CM

## 2021-12-06 RX ORDER — TRAMADOL HYDROCHLORIDE 50 MG/1
TABLET ORAL
Qty: 240 TABLET | Refills: 0 | Status: SHIPPED | OUTPATIENT
Start: 2021-12-06 | End: 2021-12-27

## 2021-12-17 DIAGNOSIS — M25.561 CHRONIC PAIN OF RIGHT KNEE: ICD-10-CM

## 2021-12-17 DIAGNOSIS — G89.29 CHRONIC PAIN OF RIGHT KNEE: ICD-10-CM

## 2021-12-17 DIAGNOSIS — M47.816 OSTEOARTHRITIS OF LUMBAR SPINE, UNSPECIFIED SPINAL OSTEOARTHRITIS COMPLICATION STATUS: ICD-10-CM

## 2021-12-17 DIAGNOSIS — S83.241A TEAR OF MEDIAL MENISCUS OF RIGHT KNEE, UNSPECIFIED TEAR TYPE, UNSPECIFIED WHETHER OLD OR CURRENT TEAR, INITIAL ENCOUNTER: ICD-10-CM

## 2021-12-17 DIAGNOSIS — G89.29 OTHER CHRONIC PAIN: ICD-10-CM

## 2021-12-17 DIAGNOSIS — L50.9 HIVES: ICD-10-CM

## 2021-12-17 DIAGNOSIS — S83.511A RUPTURE OF ANTERIOR CRUCIATE LIGAMENT OF RIGHT KNEE, INITIAL ENCOUNTER: ICD-10-CM

## 2021-12-17 DIAGNOSIS — G89.4 CHRONIC PAIN SYNDROME: ICD-10-CM

## 2021-12-17 RX ORDER — HYDROCODONE BITARTRATE AND ACETAMINOPHEN 10; 325 MG/1; MG/1
1-2 TABLET ORAL
Qty: 90 TABLET | Refills: 0 | Status: SHIPPED | OUTPATIENT
Start: 2021-12-17 | End: 2022-01-05

## 2021-12-17 NOTE — TELEPHONE ENCOUNTER
Medication Quantity Refills Start End   HYDROcodone-acetaminophen (NORCO)  MG Oral Tab 90 tablet 0 12/2/2021 1/1/2022   Sig:   Take 1-2 tablets by mouth every 4 to 6 hours as needed for Pain.      Route:   Oral     Note to Pharmacy: [facility] discontin

## 2021-12-17 NOTE — TELEPHONE ENCOUNTER
Patient called back to make sure it goes to [facility] and to check the doctor's office hours.

## 2021-12-27 DIAGNOSIS — M25.561 CHRONIC PAIN OF RIGHT KNEE: ICD-10-CM

## 2021-12-27 DIAGNOSIS — G89.29 CHRONIC PAIN OF RIGHT KNEE: ICD-10-CM

## 2021-12-27 DIAGNOSIS — S83.241A TEAR OF MEDIAL MENISCUS OF RIGHT KNEE, UNSPECIFIED TEAR TYPE, UNSPECIFIED WHETHER OLD OR CURRENT TEAR, INITIAL ENCOUNTER: ICD-10-CM

## 2021-12-27 DIAGNOSIS — S83.511A RUPTURE OF ANTERIOR CRUCIATE LIGAMENT OF RIGHT KNEE, INITIAL ENCOUNTER: ICD-10-CM

## 2021-12-27 DIAGNOSIS — G89.4 CHRONIC PAIN SYNDROME: ICD-10-CM

## 2021-12-27 DIAGNOSIS — L50.9 HIVES: ICD-10-CM

## 2021-12-27 DIAGNOSIS — M47.816 OSTEOARTHRITIS OF LUMBAR SPINE, UNSPECIFIED SPINAL OSTEOARTHRITIS COMPLICATION STATUS: ICD-10-CM

## 2021-12-27 RX ORDER — TRAMADOL HYDROCHLORIDE 50 MG/1
TABLET ORAL
Qty: 240 TABLET | Refills: 0 | Status: SHIPPED | OUTPATIENT
Start: 2021-12-27 | End: 2022-01-25

## 2022-01-05 DIAGNOSIS — M47.816 OSTEOARTHRITIS OF LUMBAR SPINE, UNSPECIFIED SPINAL OSTEOARTHRITIS COMPLICATION STATUS: ICD-10-CM

## 2022-01-05 DIAGNOSIS — S83.511A RUPTURE OF ANTERIOR CRUCIATE LIGAMENT OF RIGHT KNEE, INITIAL ENCOUNTER: ICD-10-CM

## 2022-01-05 DIAGNOSIS — M25.561 CHRONIC PAIN OF RIGHT KNEE: ICD-10-CM

## 2022-01-05 DIAGNOSIS — S83.241A TEAR OF MEDIAL MENISCUS OF RIGHT KNEE, UNSPECIFIED TEAR TYPE, UNSPECIFIED WHETHER OLD OR CURRENT TEAR, INITIAL ENCOUNTER: ICD-10-CM

## 2022-01-05 DIAGNOSIS — G89.29 CHRONIC PAIN OF RIGHT KNEE: ICD-10-CM

## 2022-01-05 DIAGNOSIS — G89.4 CHRONIC PAIN SYNDROME: ICD-10-CM

## 2022-01-05 DIAGNOSIS — G89.29 OTHER CHRONIC PAIN: ICD-10-CM

## 2022-01-05 DIAGNOSIS — L50.9 HIVES: ICD-10-CM

## 2022-01-05 RX ORDER — HYDROCODONE BITARTRATE AND ACETAMINOPHEN 10; 325 MG/1; MG/1
1-2 TABLET ORAL
Qty: 90 TABLET | Refills: 0 | Status: SHIPPED | OUTPATIENT
Start: 2022-01-05 | End: 2022-01-24

## 2022-01-05 NOTE — TELEPHONE ENCOUNTER
Medication Quantity Refills Start End   HYDROcodone-acetaminophen (NORCO)  MG Oral Tab 90 tablet 0 12/17/2021 1/16/2022   Sig:   Take 1-2 tablets by mouth every 4 to 6 hours as needed for Pain.      Route:   Oral       Last OV 11/10/21  No future appo

## 2022-01-24 DIAGNOSIS — S83.241A TEAR OF MEDIAL MENISCUS OF RIGHT KNEE, UNSPECIFIED TEAR TYPE, UNSPECIFIED WHETHER OLD OR CURRENT TEAR, INITIAL ENCOUNTER: ICD-10-CM

## 2022-01-24 DIAGNOSIS — M25.561 CHRONIC PAIN OF RIGHT KNEE: ICD-10-CM

## 2022-01-24 DIAGNOSIS — G89.29 CHRONIC PAIN OF RIGHT KNEE: ICD-10-CM

## 2022-01-24 DIAGNOSIS — G89.4 CHRONIC PAIN SYNDROME: ICD-10-CM

## 2022-01-24 DIAGNOSIS — S83.511A RUPTURE OF ANTERIOR CRUCIATE LIGAMENT OF RIGHT KNEE, INITIAL ENCOUNTER: ICD-10-CM

## 2022-01-24 DIAGNOSIS — M47.816 OSTEOARTHRITIS OF LUMBAR SPINE, UNSPECIFIED SPINAL OSTEOARTHRITIS COMPLICATION STATUS: ICD-10-CM

## 2022-01-24 DIAGNOSIS — L50.9 HIVES: ICD-10-CM

## 2022-01-24 DIAGNOSIS — G89.29 OTHER CHRONIC PAIN: ICD-10-CM

## 2022-01-24 RX ORDER — HYDROCODONE BITARTRATE AND ACETAMINOPHEN 10; 325 MG/1; MG/1
1-2 TABLET ORAL
Qty: 90 TABLET | Refills: 0 | Status: SHIPPED | OUTPATIENT
Start: 2022-01-24 | End: 2022-02-23

## 2022-01-24 NOTE — TELEPHONE ENCOUNTER
Pt will need a refill on the [place] sent to [address]. She is almost out of the medication.

## 2022-01-25 RX ORDER — TRAMADOL HYDROCHLORIDE 50 MG/1
TABLET ORAL
Qty: 240 TABLET | Refills: 0 | Status: SHIPPED | OUTPATIENT
Start: 2022-01-25 | End: 2022-02-24

## 2022-02-07 RX ORDER — HYDROCODONE BITARTRATE AND ACETAMINOPHEN 10; 325 MG/1; MG/1
1-2 TABLET ORAL
Qty: 90 TABLET | Refills: 0 | OUTPATIENT
Start: 2022-02-07 | End: 2022-03-09

## 2022-02-07 NOTE — TELEPHONE ENCOUNTER
Requesting Norco 10/325mg  LOV: 11/10/21  RTC: prn  Last Relevant Labs:   Filled: 1/24/22 #90 with 0 refills    No future appointments.     [place] , last dispensed 1/24/22 #90 (9 day supply)    Rx pended and routed for approval/denial

## 2022-02-08 RX ORDER — HYDROCODONE BITARTRATE AND ACETAMINOPHEN 10; 325 MG/1; MG/1
1-2 TABLET ORAL
Qty: 90 TABLET | Refills: 0 | Status: SHIPPED | OUTPATIENT
Start: 2022-02-08 | End: 2022-02-21

## 2022-02-08 NOTE — TELEPHONE ENCOUNTER
See TE, please advise, Rx pended for your review and approval/denial.     Per [place]- Nkechi Atkins last refilled 1/24/2022 #90, no refills, 9 day supply.

## 2022-02-08 NOTE — TELEPHONE ENCOUNTER
Patient calling, patient states she takes 2 Norco every 8hrs. Knee pain has been acting up, will be out of medication as of today. Patient will call to schedule appt at a later date, dealing with recent passing of father.  Please advise

## 2022-02-21 RX ORDER — HYDROCODONE BITARTRATE AND ACETAMINOPHEN 10; 325 MG/1; MG/1
1-2 TABLET ORAL
Qty: 90 TABLET | Refills: 0 | Status: SHIPPED | OUTPATIENT
Start: 2022-02-21 | End: 2022-03-07

## 2022-02-21 NOTE — TELEPHONE ENCOUNTER
Future Appointments   Date Time Provider Miguel Wendy   3/7/2022  1:00 PM Nataliia Bazan MD [facility] [address]     HYDROcodone-acetaminophen [facility])  MG Oral Tab    [facility] #[id number] - [place] [address] [place] [address] AT [address] [place] [address] & [address] [address] [facility], [phone], [phone]

## 2022-02-23 RX ORDER — TRAMADOL HYDROCHLORIDE 50 MG/1
TABLET ORAL
Qty: 240 TABLET | Refills: 0 | Status: SHIPPED | OUTPATIENT
Start: 2022-02-23 | End: 2022-03-21

## 2022-03-07 ENCOUNTER — OFFICE VISIT (OUTPATIENT)
Dept: FAMILY MEDICINE CLINIC | Facility: CLINIC | Age: 52
End: 2022-03-07
Payer: COMMERCIAL

## 2022-03-07 VITALS
HEART RATE: 104 BPM | WEIGHT: 246 LBS | SYSTOLIC BLOOD PRESSURE: 140 MMHG | TEMPERATURE: 97 F | BODY MASS INDEX: 36.43 KG/M2 | RESPIRATION RATE: 16 BRPM | DIASTOLIC BLOOD PRESSURE: 90 MMHG | OXYGEN SATURATION: 99 % | HEIGHT: 69 IN

## 2022-03-07 DIAGNOSIS — S83.241A TEAR OF MEDIAL MENISCUS OF RIGHT KNEE, UNSPECIFIED TEAR TYPE, UNSPECIFIED WHETHER OLD OR CURRENT TEAR, INITIAL ENCOUNTER: ICD-10-CM

## 2022-03-07 DIAGNOSIS — G89.29 CHRONIC PAIN OF RIGHT KNEE: Primary | ICD-10-CM

## 2022-03-07 DIAGNOSIS — G89.29 OTHER CHRONIC PAIN: ICD-10-CM

## 2022-03-07 DIAGNOSIS — M47.816 OSTEOARTHRITIS OF LUMBAR SPINE, UNSPECIFIED SPINAL OSTEOARTHRITIS COMPLICATION STATUS: ICD-10-CM

## 2022-03-07 DIAGNOSIS — M25.561 CHRONIC PAIN OF RIGHT KNEE: Primary | ICD-10-CM

## 2022-03-07 DIAGNOSIS — L50.9 HIVES: ICD-10-CM

## 2022-03-07 DIAGNOSIS — G89.4 CHRONIC PAIN SYNDROME: ICD-10-CM

## 2022-03-07 PROCEDURE — 99214 OFFICE O/P EST MOD 30 MIN: CPT | Performed by: FAMILY MEDICINE

## 2022-03-07 PROCEDURE — 3080F DIAST BP >= 90 MM HG: CPT | Performed by: FAMILY MEDICINE

## 2022-03-07 PROCEDURE — 3008F BODY MASS INDEX DOCD: CPT | Performed by: FAMILY MEDICINE

## 2022-03-07 PROCEDURE — 3077F SYST BP >= 140 MM HG: CPT | Performed by: FAMILY MEDICINE

## 2022-03-07 RX ORDER — HYDROCODONE BITARTRATE AND ACETAMINOPHEN 10; 325 MG/1; MG/1
1-2 TABLET ORAL
Qty: 90 TABLET | Refills: 0 | Status: SHIPPED | OUTPATIENT
Start: 2022-03-07 | End: 2022-03-21

## 2022-03-21 RX ORDER — HYDROCODONE BITARTRATE AND ACETAMINOPHEN 10; 325 MG/1; MG/1
1-2 TABLET ORAL
Qty: 90 TABLET | Refills: 0 | OUTPATIENT
Start: 2022-03-21 | End: 2022-04-20

## 2022-03-21 RX ORDER — HYDROCODONE BITARTRATE AND ACETAMINOPHEN 10; 325 MG/1; MG/1
1-2 TABLET ORAL
Qty: 90 TABLET | Refills: 0 | Status: SHIPPED | OUTPATIENT
Start: 2022-03-21 | End: 2022-03-31

## 2022-03-21 RX ORDER — TRAMADOL HYDROCHLORIDE 50 MG/1
TABLET ORAL
Qty: 240 TABLET | Refills: 0 | Status: SHIPPED | OUTPATIENT
Start: 2022-03-21 | End: 2022-04-20

## 2022-03-21 NOTE — TELEPHONE ENCOUNTER
See TE, pt has also requested refill from ortho in [facility]. Last [place]  mg #90, 9 day supply, refilled 3/7/2022.

## 2022-03-21 NOTE — TELEPHONE ENCOUNTER
Pt is calling stating that she received a message telling her that her prescription was denied. She is wondering why. She states that the pharmacy never received a request, and the nurse she spoke to told her they were taking care of it today. She would like clarification.

## 2022-03-21 NOTE — TELEPHONE ENCOUNTER
Spoke with pt, pt contacted [address] [place] [address] for an appointment to follow up on knee pain, not an Rx refill. Pt is scheduled on April 1st at 11 am, states the office just called her back to schedule her with surgeon she had seen previously. Pt is calling Dr. Hare Persons for a refill and to let him know she is seeing ortho.

## 2022-03-31 DIAGNOSIS — G89.29 OTHER CHRONIC PAIN: ICD-10-CM

## 2022-03-31 DIAGNOSIS — G89.4 CHRONIC PAIN SYNDROME: ICD-10-CM

## 2022-03-31 DIAGNOSIS — G89.29 CHRONIC PAIN OF RIGHT KNEE: ICD-10-CM

## 2022-03-31 DIAGNOSIS — S83.241A TEAR OF MEDIAL MENISCUS OF RIGHT KNEE, UNSPECIFIED TEAR TYPE, UNSPECIFIED WHETHER OLD OR CURRENT TEAR, INITIAL ENCOUNTER: ICD-10-CM

## 2022-03-31 DIAGNOSIS — L50.9 HIVES: ICD-10-CM

## 2022-03-31 DIAGNOSIS — M25.561 CHRONIC PAIN OF RIGHT KNEE: ICD-10-CM

## 2022-03-31 DIAGNOSIS — M47.816 OSTEOARTHRITIS OF LUMBAR SPINE, UNSPECIFIED SPINAL OSTEOARTHRITIS COMPLICATION STATUS: ICD-10-CM

## 2022-03-31 RX ORDER — HYDROCODONE BITARTRATE AND ACETAMINOPHEN 10; 325 MG/1; MG/1
1-2 TABLET ORAL
Qty: 90 TABLET | Refills: 0 | Status: SHIPPED | OUTPATIENT
Start: 2022-04-03 | End: 2022-04-13

## 2022-03-31 NOTE — TELEPHONE ENCOUNTER
See TE, Rx pended for first fill/earliest fill 4/3/22 for your review and approval/denial.    [place]- last dispensed 3/21/22 #90, 9 day supply

## 2022-03-31 NOTE — TELEPHONE ENCOUNTER
HYDROcodone-acetaminophen (NORCO)  MG Oral Tab    [id number] - [address] [facility] AT [address] [place] [address] Ave & , [phone], [phone]  ____________________________________    Pt states she has enough until Sunday/Monday and is calling the refill early to be prepared

## 2022-04-13 ENCOUNTER — OFFICE VISIT (OUTPATIENT)
Dept: FAMILY MEDICINE CLINIC | Facility: CLINIC | Age: 52
End: 2022-04-13
Payer: COMMERCIAL

## 2022-04-13 ENCOUNTER — TELEPHONE (OUTPATIENT)
Dept: FAMILY MEDICINE CLINIC | Facility: CLINIC | Age: 52
End: 2022-04-13

## 2022-04-13 VITALS
BODY MASS INDEX: 36.43 KG/M2 | TEMPERATURE: 98 F | SYSTOLIC BLOOD PRESSURE: 160 MMHG | WEIGHT: 246 LBS | DIASTOLIC BLOOD PRESSURE: 92 MMHG | HEIGHT: 69 IN | OXYGEN SATURATION: 99 % | HEART RATE: 88 BPM | RESPIRATION RATE: 18 BRPM

## 2022-04-13 DIAGNOSIS — S83.511A RUPTURE OF ANTERIOR CRUCIATE LIGAMENT OF RIGHT KNEE, INITIAL ENCOUNTER: ICD-10-CM

## 2022-04-13 DIAGNOSIS — M25.561 CHRONIC PAIN OF RIGHT KNEE: ICD-10-CM

## 2022-04-13 DIAGNOSIS — M47.816 OSTEOARTHRITIS OF LUMBAR SPINE, UNSPECIFIED SPINAL OSTEOARTHRITIS COMPLICATION STATUS: ICD-10-CM

## 2022-04-13 DIAGNOSIS — G89.4 CHRONIC PAIN SYNDROME: ICD-10-CM

## 2022-04-13 DIAGNOSIS — G89.29 OTHER CHRONIC PAIN: ICD-10-CM

## 2022-04-13 DIAGNOSIS — S83.241A TEAR OF MEDIAL MENISCUS OF RIGHT KNEE, UNSPECIFIED TEAR TYPE, UNSPECIFIED WHETHER OLD OR CURRENT TEAR, INITIAL ENCOUNTER: Primary | ICD-10-CM

## 2022-04-13 DIAGNOSIS — G89.29 CHRONIC PAIN OF RIGHT KNEE: ICD-10-CM

## 2022-04-13 DIAGNOSIS — L50.9 HIVES: ICD-10-CM

## 2022-04-13 PROCEDURE — 3080F DIAST BP >= 90 MM HG: CPT | Performed by: FAMILY MEDICINE

## 2022-04-13 PROCEDURE — 3008F BODY MASS INDEX DOCD: CPT | Performed by: FAMILY MEDICINE

## 2022-04-13 PROCEDURE — 99214 OFFICE O/P EST MOD 30 MIN: CPT | Performed by: FAMILY MEDICINE

## 2022-04-13 PROCEDURE — 3077F SYST BP >= 140 MM HG: CPT | Performed by: FAMILY MEDICINE

## 2022-04-13 RX ORDER — TRAMADOL HYDROCHLORIDE 50 MG/1
TABLET ORAL
Qty: 240 TABLET | Refills: 0 | Status: SHIPPED | OUTPATIENT
Start: 2022-04-13 | End: 2022-05-13

## 2022-04-13 RX ORDER — HYDROCODONE BITARTRATE AND ACETAMINOPHEN 10; 325 MG/1; MG/1
1-2 TABLET ORAL
Qty: 90 TABLET | Refills: 0 | Status: SHIPPED | OUTPATIENT
Start: 2022-04-13 | End: 2022-05-13

## 2022-04-13 NOTE — TELEPHONE ENCOUNTER
Spoke to pharmacist, per Dr. Elba garcia to dispense early. Pt had appointment today and discussed weaning schedule. Rx sent today should last pt for 30 days. Pt has follow up appointment on 4/28/22. Pharmacist verbalized understanding and agreement. All questions answered.

## 2022-04-13 NOTE — TELEPHONE ENCOUNTER
Pharmacy calling, requesting verbal consent in order to fill Harvey refill early.  Patient is currently at the pharmacy, please advise

## 2022-04-26 ENCOUNTER — TELEPHONE (OUTPATIENT)
Dept: FAMILY MEDICINE CLINIC | Facility: CLINIC | Age: 52
End: 2022-04-26

## 2022-04-26 ENCOUNTER — TELEMEDICINE (OUTPATIENT)
Dept: FAMILY MEDICINE CLINIC | Facility: CLINIC | Age: 52
End: 2022-04-26

## 2022-04-26 DIAGNOSIS — S83.241A TEAR OF MEDIAL MENISCUS OF RIGHT KNEE, UNSPECIFIED TEAR TYPE, UNSPECIFIED WHETHER OLD OR CURRENT TEAR, INITIAL ENCOUNTER: ICD-10-CM

## 2022-04-26 DIAGNOSIS — L50.9 HIVES: ICD-10-CM

## 2022-04-26 DIAGNOSIS — G89.4 CHRONIC PAIN SYNDROME: ICD-10-CM

## 2022-04-26 DIAGNOSIS — M47.816 OSTEOARTHRITIS OF LUMBAR SPINE, UNSPECIFIED SPINAL OSTEOARTHRITIS COMPLICATION STATUS: ICD-10-CM

## 2022-04-26 DIAGNOSIS — G89.29 OTHER CHRONIC PAIN: ICD-10-CM

## 2022-04-26 DIAGNOSIS — U07.1 COVID-19: Primary | ICD-10-CM

## 2022-04-26 DIAGNOSIS — M25.561 CHRONIC PAIN OF RIGHT KNEE: ICD-10-CM

## 2022-04-26 DIAGNOSIS — G89.29 CHRONIC PAIN OF RIGHT KNEE: ICD-10-CM

## 2022-04-26 PROCEDURE — 99214 OFFICE O/P EST MOD 30 MIN: CPT | Performed by: FAMILY MEDICINE

## 2022-04-26 RX ORDER — HYDROCODONE BITARTRATE AND ACETAMINOPHEN 10; 325 MG/1; MG/1
1-2 TABLET ORAL
Qty: 90 TABLET | Refills: 0 | Status: SHIPPED | OUTPATIENT
Start: 2022-04-26 | End: 2022-05-26

## 2022-04-26 NOTE — TELEPHONE ENCOUNTER
Spoke to Pablo who is a pharmacist in attendance when I called. He was not the one who called earlier. I had explained to him that the plan for weaning would be making it the standard of 30 days with quantity of 90 after this refill that we did today for her. She is aware of this. Very possibly I would want for her wean her off completely off this medication and hopefully followed by tramadol. There is a chance that I will refer her to pain management where she lives.

## 2022-04-26 NOTE — TELEPHONE ENCOUNTER
We will start weaning her off after this refill. We will lower her quantity/frequency to last for 30 days.

## 2022-04-26 NOTE — TELEPHONE ENCOUNTER
Pharmacy is told multiple times that she is tapering down on her [place] and pharmacy is concerned because she is filling it every 10 days. Is this supposed to be correct? If so, why is she continuing to get a script? Pharmacist would like a call back to let them know if they should not be filling it, or if it is changing dosage.

## 2022-04-29 NOTE — TELEPHONE ENCOUNTER
Patient called and would like to discuss with triage nurse in more detail about getting a chest xray for TB screen. She does not want to do blood work or skin test due to hypertension / allergies and she is afraid that results will not read correctly.     Lidya Mendenhall Bilateral Helical Rim Advancement Flap Text: The defect edges were debeveled with a #15 blade scalpel.  Given the location of the defect and the proximity to free margins (helical rim) a bilateral helical rim advancement flap was deemed most appropriate.  Using a sterile surgical marker, the appropriate advancement flaps were drawn incorporating the defect and placing the expected incisions between the helical rim and antihelix where possible.  The area thus outlined was incised through and through with a #15 scalpel blade.  With a skin hook and iris scissors, the flaps were gently and sharply undermined and freed up.

## 2022-05-12 ENCOUNTER — TELEPHONE (OUTPATIENT)
Dept: FAMILY MEDICINE CLINIC | Facility: CLINIC | Age: 52
End: 2022-05-12

## 2022-05-12 RX ORDER — HYDROCODONE BITARTRATE AND ACETAMINOPHEN 10; 325 MG/1; MG/1
1 TABLET ORAL EVERY 8 HOURS PRN
Qty: 90 TABLET | Refills: 0 | Status: SHIPPED | OUTPATIENT
Start: 2022-05-12 | End: 2022-06-11

## 2022-05-12 NOTE — TELEPHONE ENCOUNTER
Spoke to pt, informed her that the pharmacy will not dispense Norco Rx. Pt states the pharmacist said if Dr. Aguirre Most sent it to a different [facility] it could be dispensed, advised pt that it is flagged as a 30 day supply and won't be dispensed by either pharmacy. Pt verbalized understanding, stated that the pharmacy told her they could dispense Rx on 5/24/22, pt is ok with this. All questions answered.

## 2022-05-12 NOTE — TELEPHONE ENCOUNTER
Future Appointments   Date Time Provider Miguel Nguyen   5/26/2022 12:00 PM Anne Farmer MD [facility] [address] [facility]     Patient requesting monthly Norco refill, patient aware will be weaned. Patient requesting refill to be sent to [facility] on file.  Please advise

## 2022-05-12 NOTE — TELEPHONE ENCOUNTER
Patient requesting to transfer Norco prescription to another [facility]. Patient states pharmacist is trying to place 30 day hold on her refill, patient states this needs to be filled today. Patient frustrated with pharmacy, wants to try [facility] in [facility] which is close to home.  Please advise

## 2022-05-12 NOTE — TELEPHONE ENCOUNTER
Please inform pharmacy to hold the medication that I had sent. This is an error on my part. We did have a discussion previously upon reviewing my notes that we are going to be weaning her off.

## 2022-05-17 DIAGNOSIS — G89.4 CHRONIC PAIN SYNDROME: ICD-10-CM

## 2022-05-17 DIAGNOSIS — M25.561 CHRONIC PAIN OF RIGHT KNEE: ICD-10-CM

## 2022-05-17 DIAGNOSIS — S83.241A TEAR OF MEDIAL MENISCUS OF RIGHT KNEE, UNSPECIFIED TEAR TYPE, UNSPECIFIED WHETHER OLD OR CURRENT TEAR, INITIAL ENCOUNTER: ICD-10-CM

## 2022-05-17 DIAGNOSIS — G89.29 CHRONIC PAIN OF RIGHT KNEE: ICD-10-CM

## 2022-05-17 DIAGNOSIS — M47.816 OSTEOARTHRITIS OF LUMBAR SPINE, UNSPECIFIED SPINAL OSTEOARTHRITIS COMPLICATION STATUS: ICD-10-CM

## 2022-05-17 DIAGNOSIS — S83.511A RUPTURE OF ANTERIOR CRUCIATE LIGAMENT OF RIGHT KNEE, INITIAL ENCOUNTER: ICD-10-CM

## 2022-05-17 DIAGNOSIS — L50.9 HIVES: ICD-10-CM

## 2022-05-17 RX ORDER — TRAMADOL HYDROCHLORIDE 50 MG/1
TABLET ORAL
Qty: 240 TABLET | Refills: 0 | Status: SHIPPED | OUTPATIENT
Start: 2022-05-17 | End: 2022-06-16

## 2022-06-13 DIAGNOSIS — G89.29 CHRONIC PAIN OF RIGHT KNEE: ICD-10-CM

## 2022-06-13 DIAGNOSIS — L50.9 HIVES: ICD-10-CM

## 2022-06-13 DIAGNOSIS — S83.241A TEAR OF MEDIAL MENISCUS OF RIGHT KNEE, UNSPECIFIED TEAR TYPE, UNSPECIFIED WHETHER OLD OR CURRENT TEAR, INITIAL ENCOUNTER: ICD-10-CM

## 2022-06-13 DIAGNOSIS — M25.561 CHRONIC PAIN OF RIGHT KNEE: ICD-10-CM

## 2022-06-13 DIAGNOSIS — S83.511A RUPTURE OF ANTERIOR CRUCIATE LIGAMENT OF RIGHT KNEE, INITIAL ENCOUNTER: ICD-10-CM

## 2022-06-13 DIAGNOSIS — M47.816 OSTEOARTHRITIS OF LUMBAR SPINE, UNSPECIFIED SPINAL OSTEOARTHRITIS COMPLICATION STATUS: ICD-10-CM

## 2022-06-13 DIAGNOSIS — G89.4 CHRONIC PAIN SYNDROME: ICD-10-CM

## 2022-06-13 RX ORDER — TRAMADOL HYDROCHLORIDE 50 MG/1
TABLET ORAL
Qty: 240 TABLET | Refills: 0 | Status: SHIPPED | OUTPATIENT
Start: 2022-06-13 | End: 2022-07-13

## 2022-06-14 ENCOUNTER — TELEMEDICINE (OUTPATIENT)
Dept: FAMILY MEDICINE CLINIC | Facility: CLINIC | Age: 52
End: 2022-06-14

## 2022-06-14 DIAGNOSIS — S83.241D TEAR OF MEDIAL MENISCUS OF RIGHT KNEE, UNSPECIFIED TEAR TYPE, UNSPECIFIED WHETHER OLD OR CURRENT TEAR, SUBSEQUENT ENCOUNTER: Primary | ICD-10-CM

## 2022-06-14 DIAGNOSIS — G89.4 CHRONIC PAIN SYNDROME: ICD-10-CM

## 2022-06-14 PROCEDURE — 99214 OFFICE O/P EST MOD 30 MIN: CPT | Performed by: FAMILY MEDICINE

## 2022-06-21 RX ORDER — HYDROCODONE BITARTRATE AND ACETAMINOPHEN 10; 325 MG/1; MG/1
1 TABLET ORAL EVERY 8 HOURS PRN
Qty: 90 TABLET | Refills: 0 | Status: SHIPPED | OUTPATIENT
Start: 2022-06-21 | End: 2022-06-21

## 2022-06-21 RX ORDER — HYDROCODONE BITARTRATE AND ACETAMINOPHEN 10; 325 MG/1; MG/1
1 TABLET ORAL EVERY 8 HOURS PRN
Qty: 90 TABLET | Refills: 0 | Status: SHIPPED | OUTPATIENT
Start: 2022-06-21 | End: 2022-07-21

## 2022-06-21 NOTE — TELEPHONE ENCOUNTER
Pt called and said the R[place] was sent to the wrong [facility], she needs it sent to [address], [facility] AT [address] [facility] [address] [facility], [place], [phone], [phone]

## 2022-06-21 NOTE — TELEPHONE ENCOUNTER
Requesting Norco 10/325mg  LOV: 6/14/22  RTC: prn  Last Relevant Labs: 9/24/19  Filled: 5/12/22 #90 with 0 refills    No future appointments.     Per [place] , last dispensed 5/24/22 #90

## 2022-06-21 NOTE — TELEPHONE ENCOUNTER
Patient requesting refill on monthly Norco, patient requesting refill to be sent to [facility].  Please advise

## 2022-06-30 ENCOUNTER — TELEPHONE (OUTPATIENT)
Dept: FAMILY MEDICINE CLINIC | Facility: CLINIC | Age: 52
End: 2022-06-30

## 2022-06-30 NOTE — TELEPHONE ENCOUNTER
Received incoming fax from [facility] paperwork on patient. Will place in doctor bin for review.

## 2022-07-05 ENCOUNTER — TELEMEDICINE (OUTPATIENT)
Dept: FAMILY MEDICINE CLINIC | Facility: CLINIC | Age: 52
End: 2022-07-05

## 2022-07-05 DIAGNOSIS — L50.9 HIVES: Primary | ICD-10-CM

## 2022-07-05 DIAGNOSIS — L08.9 INFECTED SKIN LESION: ICD-10-CM

## 2022-07-05 PROCEDURE — 99214 OFFICE O/P EST MOD 30 MIN: CPT | Performed by: FAMILY MEDICINE

## 2022-07-05 RX ORDER — PREDNISONE 10 MG/1
TABLET ORAL
Qty: 11 TABLET | Refills: 0 | Status: SHIPPED | OUTPATIENT
Start: 2022-07-05 | End: 2022-07-14

## 2022-07-05 RX ORDER — CLINDAMYCIN HYDROCHLORIDE 300 MG/1
300 CAPSULE ORAL 3 TIMES DAILY
Qty: 21 CAPSULE | Refills: 0 | Status: SHIPPED | OUTPATIENT
Start: 2022-07-05 | End: 2022-07-12

## 2022-07-11 DIAGNOSIS — G89.4 CHRONIC PAIN SYNDROME: ICD-10-CM

## 2022-07-11 DIAGNOSIS — S83.241A TEAR OF MEDIAL MENISCUS OF RIGHT KNEE, UNSPECIFIED TEAR TYPE, UNSPECIFIED WHETHER OLD OR CURRENT TEAR, INITIAL ENCOUNTER: ICD-10-CM

## 2022-07-11 DIAGNOSIS — M25.561 CHRONIC PAIN OF RIGHT KNEE: ICD-10-CM

## 2022-07-11 DIAGNOSIS — S83.511A RUPTURE OF ANTERIOR CRUCIATE LIGAMENT OF RIGHT KNEE, INITIAL ENCOUNTER: ICD-10-CM

## 2022-07-11 DIAGNOSIS — L50.9 HIVES: ICD-10-CM

## 2022-07-11 DIAGNOSIS — G89.29 CHRONIC PAIN OF RIGHT KNEE: ICD-10-CM

## 2022-07-11 DIAGNOSIS — M47.816 OSTEOARTHRITIS OF LUMBAR SPINE, UNSPECIFIED SPINAL OSTEOARTHRITIS COMPLICATION STATUS: ICD-10-CM

## 2022-07-12 RX ORDER — TRAMADOL HYDROCHLORIDE 50 MG/1
TABLET ORAL
Qty: 240 TABLET | Refills: 0 | Status: SHIPPED | OUTPATIENT
Start: 2022-07-12 | End: 2022-08-11

## 2022-07-20 NOTE — TELEPHONE ENCOUNTER
Pt called and said she needs a refill on [place] sent to [address], [address] [facility] [address] [place] [address], [phone], [phone].

## 2022-07-21 RX ORDER — HYDROCODONE BITARTRATE AND ACETAMINOPHEN 10; 325 MG/1; MG/1
1 TABLET ORAL EVERY 8 HOURS PRN
Qty: 90 TABLET | Refills: 0 | Status: SHIPPED | OUTPATIENT
Start: 2022-07-21 | End: 2022-08-03

## 2022-07-21 NOTE — TELEPHONE ENCOUNTER
Requesting Norco 10/325mg  LOV: 7/5/22  RTC: prn  Last Relevant Labs: 9/24/19  Filled: 6/21/22 #90 with 0 refills    No future appointments.     Per [place] , last dispensed 6/21/22 #90    Rx pended and routed for approval/denial

## 2022-07-21 NOTE — TELEPHONE ENCOUNTER
Pt called again asking for her Norco, I did tell her again Dr. Gracy Huang is out of the office and it could take up to 72 hours. She said Dr. Gracy Huang told her to to call when she only has 1 pill left or the day she runs out of her meds.

## 2022-08-03 ENCOUNTER — TELEMEDICINE (OUTPATIENT)
Dept: FAMILY MEDICINE CLINIC | Facility: CLINIC | Age: 52
End: 2022-08-03

## 2022-08-03 DIAGNOSIS — S83.241A TEAR OF MEDIAL MENISCUS OF RIGHT KNEE, UNSPECIFIED TEAR TYPE, UNSPECIFIED WHETHER OLD OR CURRENT TEAR, INITIAL ENCOUNTER: ICD-10-CM

## 2022-08-03 DIAGNOSIS — M25.561 CHRONIC PAIN OF RIGHT KNEE: ICD-10-CM

## 2022-08-03 DIAGNOSIS — G89.29 CHRONIC PAIN OF RIGHT KNEE: ICD-10-CM

## 2022-08-03 DIAGNOSIS — M47.816 OSTEOARTHRITIS OF LUMBAR SPINE, UNSPECIFIED SPINAL OSTEOARTHRITIS COMPLICATION STATUS: ICD-10-CM

## 2022-08-03 DIAGNOSIS — G89.4 CHRONIC PAIN SYNDROME: ICD-10-CM

## 2022-08-03 DIAGNOSIS — S83.511A RUPTURE OF ANTERIOR CRUCIATE LIGAMENT OF RIGHT KNEE, INITIAL ENCOUNTER: ICD-10-CM

## 2022-08-03 PROCEDURE — 99214 OFFICE O/P EST MOD 30 MIN: CPT | Performed by: FAMILY MEDICINE

## 2022-08-03 RX ORDER — TRAMADOL HYDROCHLORIDE 50 MG/1
TABLET ORAL
Qty: 240 TABLET | Refills: 0 | Status: SHIPPED | OUTPATIENT
Start: 2022-08-11 | End: 2022-09-10

## 2022-08-03 RX ORDER — HYDROCODONE BITARTRATE AND ACETAMINOPHEN 10; 325 MG/1; MG/1
1 TABLET ORAL EVERY 8 HOURS PRN
Qty: 90 TABLET | Refills: 0 | Status: SHIPPED | OUTPATIENT
Start: 2022-08-21 | End: 2022-09-20

## 2022-08-22 RX ORDER — PREDNISONE 10 MG/1
TABLET ORAL
Qty: 11 TABLET | Refills: 0 | Status: SHIPPED | OUTPATIENT
Start: 2022-08-22

## 2022-09-06 DIAGNOSIS — S83.511A RUPTURE OF ANTERIOR CRUCIATE LIGAMENT OF RIGHT KNEE, INITIAL ENCOUNTER: ICD-10-CM

## 2022-09-06 DIAGNOSIS — G89.4 CHRONIC PAIN SYNDROME: ICD-10-CM

## 2022-09-06 DIAGNOSIS — G89.29 CHRONIC PAIN OF RIGHT KNEE: ICD-10-CM

## 2022-09-06 DIAGNOSIS — M25.561 CHRONIC PAIN OF RIGHT KNEE: ICD-10-CM

## 2022-09-06 DIAGNOSIS — S83.241A TEAR OF MEDIAL MENISCUS OF RIGHT KNEE, UNSPECIFIED TEAR TYPE, UNSPECIFIED WHETHER OLD OR CURRENT TEAR, INITIAL ENCOUNTER: ICD-10-CM

## 2022-09-06 DIAGNOSIS — M47.816 OSTEOARTHRITIS OF LUMBAR SPINE, UNSPECIFIED SPINAL OSTEOARTHRITIS COMPLICATION STATUS: ICD-10-CM

## 2022-09-07 RX ORDER — TRAMADOL HYDROCHLORIDE 50 MG/1
TABLET ORAL
Qty: 240 TABLET | Refills: 0 | Status: SHIPPED | OUTPATIENT
Start: 2022-09-07 | End: 2022-10-07

## 2022-09-16 NOTE — TELEPHONE ENCOUNTER
Requesting Norco 10/325mg  LOV: 8/3/22  RTC: 3 months  Last Relevant Labs: 9/24/19  Filled: 8/21/22 #90 with 0 refills    No future appointments.     Rx pended and routed for approval/denial

## 2022-09-18 RX ORDER — HYDROCODONE BITARTRATE AND ACETAMINOPHEN 10; 325 MG/1; MG/1
1 TABLET ORAL EVERY 8 HOURS PRN
Qty: 90 TABLET | Refills: 0 | Status: SHIPPED | OUTPATIENT
Start: 2022-09-18 | End: 2022-10-18

## 2022-09-21 RX ORDER — PREDNISONE 10 MG/1
TABLET ORAL
Qty: 11 TABLET | Refills: 0 | Status: SHIPPED | OUTPATIENT
Start: 2022-09-21

## 2022-10-05 DIAGNOSIS — G89.4 CHRONIC PAIN SYNDROME: ICD-10-CM

## 2022-10-05 DIAGNOSIS — M47.816 OSTEOARTHRITIS OF LUMBAR SPINE, UNSPECIFIED SPINAL OSTEOARTHRITIS COMPLICATION STATUS: ICD-10-CM

## 2022-10-05 DIAGNOSIS — S83.511A RUPTURE OF ANTERIOR CRUCIATE LIGAMENT OF RIGHT KNEE, INITIAL ENCOUNTER: ICD-10-CM

## 2022-10-05 DIAGNOSIS — G89.29 CHRONIC PAIN OF RIGHT KNEE: ICD-10-CM

## 2022-10-05 DIAGNOSIS — M25.561 CHRONIC PAIN OF RIGHT KNEE: ICD-10-CM

## 2022-10-05 DIAGNOSIS — S83.241A TEAR OF MEDIAL MENISCUS OF RIGHT KNEE, UNSPECIFIED TEAR TYPE, UNSPECIFIED WHETHER OLD OR CURRENT TEAR, INITIAL ENCOUNTER: ICD-10-CM

## 2022-10-05 RX ORDER — FEXOFENADINE HCL 180 MG/1
TABLET ORAL
COMMUNITY

## 2022-10-05 RX ORDER — TRAMADOL HYDROCHLORIDE 50 MG/1
TABLET ORAL
Qty: 240 TABLET | Refills: 0 | Status: SHIPPED | OUTPATIENT
Start: 2022-10-05 | End: 2022-11-04

## 2022-10-05 RX ORDER — IBUPROFEN 100 MG/1
TABLET, CHEWABLE ORAL
COMMUNITY

## 2022-10-05 RX ORDER — OXYCODONE HYDROCHLORIDE AND ACETAMINOPHEN 5; 325 MG/1; MG/1
1 TABLET ORAL AS DIRECTED
COMMUNITY
Start: 2021-05-19

## 2022-10-16 RX ORDER — HYDROCODONE BITARTRATE AND ACETAMINOPHEN 10; 325 MG/1; MG/1
1 TABLET ORAL EVERY 8 HOURS PRN
Qty: 90 TABLET | Refills: 0 | Status: SHIPPED | OUTPATIENT
Start: 2022-10-16 | End: 2022-11-15

## 2022-11-03 ENCOUNTER — TELEMEDICINE (OUTPATIENT)
Dept: FAMILY MEDICINE CLINIC | Facility: CLINIC | Age: 52
End: 2022-11-03
Payer: COMMERCIAL

## 2022-11-03 DIAGNOSIS — S83.511A RUPTURE OF ANTERIOR CRUCIATE LIGAMENT OF RIGHT KNEE, INITIAL ENCOUNTER: ICD-10-CM

## 2022-11-03 DIAGNOSIS — S83.241A TEAR OF MEDIAL MENISCUS OF RIGHT KNEE, UNSPECIFIED TEAR TYPE, UNSPECIFIED WHETHER OLD OR CURRENT TEAR, INITIAL ENCOUNTER: ICD-10-CM

## 2022-11-03 DIAGNOSIS — G89.4 CHRONIC PAIN SYNDROME: ICD-10-CM

## 2022-11-03 DIAGNOSIS — L50.9 HIVES: Primary | ICD-10-CM

## 2022-11-03 DIAGNOSIS — M47.816 OSTEOARTHRITIS OF LUMBAR SPINE, UNSPECIFIED SPINAL OSTEOARTHRITIS COMPLICATION STATUS: ICD-10-CM

## 2022-11-03 DIAGNOSIS — M25.561 CHRONIC PAIN OF RIGHT KNEE: ICD-10-CM

## 2022-11-03 DIAGNOSIS — G89.29 CHRONIC PAIN OF RIGHT KNEE: ICD-10-CM

## 2022-11-03 RX ORDER — TRAMADOL HYDROCHLORIDE 50 MG/1
TABLET ORAL
Qty: 240 TABLET | Refills: 0 | Status: SHIPPED | OUTPATIENT
Start: 2022-11-03 | End: 2022-12-03

## 2022-11-03 RX ORDER — PREDNISONE 10 MG/1
TABLET ORAL
Qty: 11 TABLET | Refills: 0 | Status: SHIPPED | OUTPATIENT
Start: 2022-11-03

## 2022-11-10 RX ORDER — HYDROCODONE BITARTRATE AND ACETAMINOPHEN 10; 325 MG/1; MG/1
1 TABLET ORAL EVERY 8 HOURS PRN
Qty: 90 TABLET | Refills: 0 | Status: SHIPPED | OUTPATIENT
Start: 2022-11-10 | End: 2022-12-10

## 2022-11-29 DIAGNOSIS — G89.4 CHRONIC PAIN SYNDROME: ICD-10-CM

## 2022-11-29 DIAGNOSIS — S83.241A TEAR OF MEDIAL MENISCUS OF RIGHT KNEE, UNSPECIFIED TEAR TYPE, UNSPECIFIED WHETHER OLD OR CURRENT TEAR, INITIAL ENCOUNTER: ICD-10-CM

## 2022-11-29 DIAGNOSIS — M47.816 OSTEOARTHRITIS OF LUMBAR SPINE, UNSPECIFIED SPINAL OSTEOARTHRITIS COMPLICATION STATUS: ICD-10-CM

## 2022-11-29 DIAGNOSIS — G89.29 CHRONIC PAIN OF RIGHT KNEE: ICD-10-CM

## 2022-11-29 DIAGNOSIS — S83.511A RUPTURE OF ANTERIOR CRUCIATE LIGAMENT OF RIGHT KNEE, INITIAL ENCOUNTER: ICD-10-CM

## 2022-11-29 DIAGNOSIS — M25.561 CHRONIC PAIN OF RIGHT KNEE: ICD-10-CM

## 2022-11-29 RX ORDER — TRAMADOL HYDROCHLORIDE 50 MG/1
TABLET ORAL
Qty: 240 TABLET | Refills: 0 | Status: SHIPPED | OUTPATIENT
Start: 2022-11-29

## 2022-12-05 RX ORDER — HYDROCODONE BITARTRATE AND ACETAMINOPHEN 10; 325 MG/1; MG/1
1 TABLET ORAL EVERY 8 HOURS PRN
Qty: 90 TABLET | Refills: 0 | Status: SHIPPED | OUTPATIENT
Start: 2022-12-05 | End: 2023-01-04

## 2022-12-27 DIAGNOSIS — G89.4 CHRONIC PAIN SYNDROME: ICD-10-CM

## 2022-12-27 DIAGNOSIS — S83.511A RUPTURE OF ANTERIOR CRUCIATE LIGAMENT OF RIGHT KNEE, INITIAL ENCOUNTER: ICD-10-CM

## 2022-12-27 DIAGNOSIS — M25.561 CHRONIC PAIN OF RIGHT KNEE: ICD-10-CM

## 2022-12-27 DIAGNOSIS — M47.816 OSTEOARTHRITIS OF LUMBAR SPINE, UNSPECIFIED SPINAL OSTEOARTHRITIS COMPLICATION STATUS: ICD-10-CM

## 2022-12-27 DIAGNOSIS — S83.241A TEAR OF MEDIAL MENISCUS OF RIGHT KNEE, UNSPECIFIED TEAR TYPE, UNSPECIFIED WHETHER OLD OR CURRENT TEAR, INITIAL ENCOUNTER: ICD-10-CM

## 2022-12-27 DIAGNOSIS — G89.29 CHRONIC PAIN OF RIGHT KNEE: ICD-10-CM

## 2022-12-27 RX ORDER — TRAMADOL HYDROCHLORIDE 50 MG/1
TABLET ORAL
Qty: 240 TABLET | Refills: 0 | Status: SHIPPED | OUTPATIENT
Start: 2022-12-27

## 2023-01-04 RX ORDER — HYDROCODONE BITARTRATE AND ACETAMINOPHEN 10; 325 MG/1; MG/1
1 TABLET ORAL EVERY 8 HOURS PRN
Qty: 90 TABLET | Refills: 0 | Status: SHIPPED | OUTPATIENT
Start: 2023-01-04 | End: 2023-02-03

## 2023-01-04 NOTE — TELEPHONE ENCOUNTER
Pt requesting refill of HYDROcodone-acetaminophen ([address])  MG Oral Tab for 1/9/23.   Future Appointments   Date Time Provider Miguel Wendy   2/1/2023  1:30 PM Mayur Goodwin MD [facility] [address] [facility] [address]

## 2023-01-24 ENCOUNTER — TELEPHONE (OUTPATIENT)
Dept: FAMILY MEDICINE CLINIC | Facility: CLINIC | Age: 53
End: 2023-01-24

## 2023-01-24 DIAGNOSIS — G89.29 CHRONIC PAIN OF RIGHT KNEE: ICD-10-CM

## 2023-01-24 DIAGNOSIS — G89.4 CHRONIC PAIN SYNDROME: ICD-10-CM

## 2023-01-24 DIAGNOSIS — S83.241A TEAR OF MEDIAL MENISCUS OF RIGHT KNEE, UNSPECIFIED TEAR TYPE, UNSPECIFIED WHETHER OLD OR CURRENT TEAR, INITIAL ENCOUNTER: ICD-10-CM

## 2023-01-24 DIAGNOSIS — M47.816 OSTEOARTHRITIS OF LUMBAR SPINE, UNSPECIFIED SPINAL OSTEOARTHRITIS COMPLICATION STATUS: ICD-10-CM

## 2023-01-24 DIAGNOSIS — M25.561 CHRONIC PAIN OF RIGHT KNEE: ICD-10-CM

## 2023-01-24 DIAGNOSIS — S83.511A RUPTURE OF ANTERIOR CRUCIATE LIGAMENT OF RIGHT KNEE, INITIAL ENCOUNTER: ICD-10-CM

## 2023-01-24 RX ORDER — TRAMADOL HYDROCHLORIDE 50 MG/1
TABLET ORAL
Qty: 240 TABLET | Refills: 0 | Status: SHIPPED | OUTPATIENT
Start: 2023-01-24

## 2023-01-24 NOTE — TELEPHONE ENCOUNTER
Recd forms from [place] from [employer] [place] [employer] for re-certification of leave that physician has completed. Asked that they be faxed to [phone] for pt. Copy sent to scan and copy placed in blue accordion folder at .

## 2023-01-26 ENCOUNTER — TELEPHONE (OUTPATIENT)
Dept: FAMILY MEDICINE CLINIC | Facility: CLINIC | Age: 53
End: 2023-01-26

## 2023-01-26 NOTE — TELEPHONE ENCOUNTER
Dr. Christel Triplett is calling to ask if Meication can be released early. Fill date should be 01/28/2023 with directions. Last picked up on 12/30/2022. Please advise as patient is calling the pharmacy wanting to pick it up today or tomorrow. Please advise Rupali Boss with [facility] would like a call back to approve.   [phone]

## 2023-01-30 RX ORDER — PREDNISONE 10 MG/1
TABLET ORAL
Qty: 11 TABLET | Refills: 0 | Status: SHIPPED | OUTPATIENT
Start: 2023-01-30

## 2023-02-01 ENCOUNTER — OFFICE VISIT (OUTPATIENT)
Dept: FAMILY MEDICINE CLINIC | Facility: CLINIC | Age: 53
End: 2023-02-01
Payer: COMMERCIAL

## 2023-02-01 VITALS
RESPIRATION RATE: 16 BRPM | DIASTOLIC BLOOD PRESSURE: 100 MMHG | OXYGEN SATURATION: 98 % | BODY MASS INDEX: 36.14 KG/M2 | TEMPERATURE: 98 F | SYSTOLIC BLOOD PRESSURE: 174 MMHG | WEIGHT: 244 LBS | HEIGHT: 69 IN | HEART RATE: 88 BPM

## 2023-02-01 DIAGNOSIS — Z00.00 WELLNESS EXAMINATION: Primary | ICD-10-CM

## 2023-02-01 DIAGNOSIS — Z12.31 ENCOUNTER FOR SCREENING MAMMOGRAM FOR MALIGNANT NEOPLASM OF BREAST: ICD-10-CM

## 2023-02-01 PROBLEM — T78.3XXA ANGIOEDEMA: Status: ACTIVE | Noted: 2017-08-08

## 2023-02-01 PROBLEM — L29.9 PRURITIC DISORDER: Status: ACTIVE | Noted: 2017-08-08

## 2023-02-01 PROBLEM — D68.51 FACTOR V LEIDEN (HCC): Status: ACTIVE | Noted: 2021-03-03

## 2023-02-01 PROBLEM — G89.29 CHRONIC LOW BACK PAIN: Status: ACTIVE | Noted: 2018-05-29

## 2023-02-01 PROBLEM — R07.9 CHEST PAIN: Status: ACTIVE | Noted: 2020-02-19

## 2023-02-01 PROBLEM — S83.231A COMPLEX TEAR OF MEDIAL MENISCUS, CURRENT INJURY, RIGHT KNEE, INITIAL ENCOUNTER: Status: ACTIVE | Noted: 2021-05-10

## 2023-02-01 PROBLEM — M94.20 CHONDROMALACIA: Status: ACTIVE | Noted: 2023-02-01

## 2023-02-01 PROBLEM — L50.3 DERMATOGRAPHIC URTICARIA: Status: ACTIVE | Noted: 2017-08-08

## 2023-02-01 PROBLEM — M54.50 CHRONIC LOW BACK PAIN: Status: ACTIVE | Noted: 2018-05-29

## 2023-02-01 PROCEDURE — 3080F DIAST BP >= 90 MM HG: CPT | Performed by: FAMILY MEDICINE

## 2023-02-01 PROCEDURE — 99396 PREV VISIT EST AGE 40-64: CPT | Performed by: FAMILY MEDICINE

## 2023-02-01 PROCEDURE — 3077F SYST BP >= 140 MM HG: CPT | Performed by: FAMILY MEDICINE

## 2023-02-01 PROCEDURE — 3008F BODY MASS INDEX DOCD: CPT | Performed by: FAMILY MEDICINE

## 2023-02-01 RX ORDER — PREDNISONE 1 MG/1
5 TABLET ORAL DAILY
Qty: 7 TABLET | Refills: 1 | Status: SHIPPED | OUTPATIENT
Start: 2023-02-01 | End: 2023-02-08

## 2023-02-01 NOTE — PATIENT INSTRUCTIONS
Thank you for choosing Silvia Fletcher MD at [place] [address]  To Do: Flora Mcadams  1. Please see age appropriate health prevention below    [facility] is located in [address]. Monday, Tuesday & Friday - 8 a.m. to 4 p.m. Wednesday, Thursday - 7 a.m. to 3 p.m. The lab is closed daily from 12 p.m.-12:30 p.m. Saturday lab hours by appointment. Call [phone] to schedule the appointment. Please signup for App Partner, which is electronic access to your record if you have not done so. All your results will post on there. [email or web address]   You can NOW use App Partner to book your appointments with us, or consider using open access scheduling which is through the edward website [email or web address] and type in Silvia Fletcher MD and follow the links for \"Schedule Online Now\"    To schedule Imaging or tests at [facility] Scheduling [phone], Go to [facility] A ER Building (For example: CT scans, X rays, Ultrasound, MRI)  Cardiac Testing in ER building Building A second floor Cardiac Testing [phone] (For example: Holter Monitor, Cardiac Stress tests,Event Monitor, or 2D Echocardiograms)  [facility] call [phone] usually in Bldg A  Walk in Clinic in [place] at [facility]. [address] Mon-Fri at 8am-7:30 p.m., and Sat/Sun 9:00a. m.-4:30 p.m. Also at [address]  Call [phone] for info     Please call our office about any questions regarding your treatment/medicines/tests as a result of today's visit. For your safety, read the entire package insert of all medicines prescribed to you and be aware of all of the risks of treatment even beyond those discussed today. All therapies have potential risk of harm or side effects or medication interactions.   It is your duty and for your safety to discuss with the pharmacist and our office with questions, and to notify us and stop treatment if problems arise, but know that our intention is that the benefits outweigh those potential risks and we strive to make you healthier and to improve your quality of life. Referrals, and Radiology Information:    If your insurance requires a referral to a specialist, please allow 5 business days to process your referral request.    If Jaja Carrizales MD orders a CT or MRI, it may take up to 10 business days to receive approval from your insurance company. Once our office has called informing you that the insurance company approved your testing, please call Central Scheduling at [phone]  Please allow our office 5 business days to contact you regarding any testing results. Refill policies:   Allow 3 business days for refills; controlled substances may take longer and must be picked up from the office in person. Narcotic medications can only be filled in 30 day increments and must be refilled at an office visit only. If your prescription is due for a refill, you may be due for a follow-up appointment. We cannot refill your maintenance medications at a preventative wellness visit. To best provide you care, patients receiving maintenance medications need to be seen at least twice a year.

## 2023-02-02 ENCOUNTER — TELEPHONE (OUTPATIENT)
Dept: FAMILY MEDICINE CLINIC | Facility: CLINIC | Age: 53
End: 2023-02-02

## 2023-02-02 NOTE — TELEPHONE ENCOUNTER
Faxed signed [employer] Requisition to [employer] at [phone] along with face sheet and patients insurance. Requisition sent to scan.

## 2023-02-06 RX ORDER — HYDROCODONE BITARTRATE AND ACETAMINOPHEN 10; 325 MG/1; MG/1
1 TABLET ORAL EVERY 8 HOURS PRN
Qty: 90 TABLET | Refills: 0 | Status: SHIPPED | OUTPATIENT
Start: 2023-02-06 | End: 2023-03-08

## 2023-02-09 RX ORDER — PREDNISONE 1 MG/1
TABLET ORAL
Qty: 30 TABLET | Refills: 0 | Status: SHIPPED | OUTPATIENT
Start: 2023-02-09

## 2023-02-09 RX ORDER — PREDNISONE 1 MG/1
TABLET ORAL
Qty: 90 TABLET | Refills: 0 | Status: SHIPPED | OUTPATIENT
Start: 2023-02-09

## 2023-02-23 DIAGNOSIS — G89.29 CHRONIC PAIN OF RIGHT KNEE: ICD-10-CM

## 2023-02-23 DIAGNOSIS — S83.511A RUPTURE OF ANTERIOR CRUCIATE LIGAMENT OF RIGHT KNEE, INITIAL ENCOUNTER: ICD-10-CM

## 2023-02-23 DIAGNOSIS — S83.241A TEAR OF MEDIAL MENISCUS OF RIGHT KNEE, UNSPECIFIED TEAR TYPE, UNSPECIFIED WHETHER OLD OR CURRENT TEAR, INITIAL ENCOUNTER: ICD-10-CM

## 2023-02-23 DIAGNOSIS — M47.816 OSTEOARTHRITIS OF LUMBAR SPINE, UNSPECIFIED SPINAL OSTEOARTHRITIS COMPLICATION STATUS: ICD-10-CM

## 2023-02-23 DIAGNOSIS — M25.561 CHRONIC PAIN OF RIGHT KNEE: ICD-10-CM

## 2023-02-23 DIAGNOSIS — G89.4 CHRONIC PAIN SYNDROME: ICD-10-CM

## 2023-02-23 RX ORDER — TRAMADOL HYDROCHLORIDE 50 MG/1
TABLET ORAL
Qty: 240 TABLET | Refills: 0 | Status: SHIPPED | OUTPATIENT
Start: 2023-02-23

## 2023-03-01 RX ORDER — HYDROCODONE BITARTRATE AND ACETAMINOPHEN 10; 325 MG/1; MG/1
1 TABLET ORAL EVERY 8 HOURS PRN
Qty: 90 TABLET | Refills: 0 | Status: SHIPPED | OUTPATIENT
Start: 2023-03-01 | End: 2023-03-31

## 2023-03-23 DIAGNOSIS — M25.561 CHRONIC PAIN OF RIGHT KNEE: ICD-10-CM

## 2023-03-23 DIAGNOSIS — G89.29 CHRONIC PAIN OF RIGHT KNEE: ICD-10-CM

## 2023-03-23 DIAGNOSIS — G89.4 CHRONIC PAIN SYNDROME: ICD-10-CM

## 2023-03-23 DIAGNOSIS — S83.241A TEAR OF MEDIAL MENISCUS OF RIGHT KNEE, UNSPECIFIED TEAR TYPE, UNSPECIFIED WHETHER OLD OR CURRENT TEAR, INITIAL ENCOUNTER: ICD-10-CM

## 2023-03-23 DIAGNOSIS — M47.816 OSTEOARTHRITIS OF LUMBAR SPINE, UNSPECIFIED SPINAL OSTEOARTHRITIS COMPLICATION STATUS: ICD-10-CM

## 2023-03-23 DIAGNOSIS — S83.511A RUPTURE OF ANTERIOR CRUCIATE LIGAMENT OF RIGHT KNEE, INITIAL ENCOUNTER: ICD-10-CM

## 2023-03-24 RX ORDER — PREDNISONE 1 MG/1
TABLET ORAL
Qty: 90 TABLET | Refills: 0 | Status: SHIPPED | OUTPATIENT
Start: 2023-03-24

## 2023-03-24 RX ORDER — TRAMADOL HYDROCHLORIDE 50 MG/1
TABLET ORAL
Qty: 240 TABLET | Refills: 0 | Status: SHIPPED | OUTPATIENT
Start: 2023-03-24

## 2023-04-03 RX ORDER — HYDROCODONE BITARTRATE AND ACETAMINOPHEN 10; 325 MG/1; MG/1
1 TABLET ORAL EVERY 8 HOURS PRN
Qty: 90 TABLET | Refills: 0 | Status: SHIPPED | OUTPATIENT
Start: 2023-04-03 | End: 2023-05-03

## 2023-04-19 DIAGNOSIS — S83.511A RUPTURE OF ANTERIOR CRUCIATE LIGAMENT OF RIGHT KNEE, INITIAL ENCOUNTER: ICD-10-CM

## 2023-04-19 DIAGNOSIS — G89.29 CHRONIC PAIN OF RIGHT KNEE: ICD-10-CM

## 2023-04-19 DIAGNOSIS — S83.241A TEAR OF MEDIAL MENISCUS OF RIGHT KNEE, UNSPECIFIED TEAR TYPE, UNSPECIFIED WHETHER OLD OR CURRENT TEAR, INITIAL ENCOUNTER: ICD-10-CM

## 2023-04-19 DIAGNOSIS — M25.561 CHRONIC PAIN OF RIGHT KNEE: ICD-10-CM

## 2023-04-19 DIAGNOSIS — G89.4 CHRONIC PAIN SYNDROME: ICD-10-CM

## 2023-04-19 DIAGNOSIS — M47.816 OSTEOARTHRITIS OF LUMBAR SPINE, UNSPECIFIED SPINAL OSTEOARTHRITIS COMPLICATION STATUS: ICD-10-CM

## 2023-04-19 RX ORDER — TRAMADOL HYDROCHLORIDE 50 MG/1
TABLET ORAL
Qty: 240 TABLET | Refills: 0 | Status: SHIPPED | OUTPATIENT
Start: 2023-04-19

## 2023-04-24 RX ORDER — CYCLOBENZAPRINE HCL 10 MG
TABLET ORAL
Qty: 30 TABLET | Refills: 5 | Status: SHIPPED | OUTPATIENT
Start: 2023-04-24

## 2023-04-27 ENCOUNTER — TELEPHONE (OUTPATIENT)
Dept: FAMILY MEDICINE CLINIC | Facility: CLINIC | Age: 53
End: 2023-04-27

## 2023-04-27 NOTE — TELEPHONE ENCOUNTER
Future Appointments   Date Time Provider Miguel Wendy   5/2/2023  1:00 PM Nataliia Bazan MD [facility] [address]

## 2023-05-02 ENCOUNTER — OFFICE VISIT (OUTPATIENT)
Dept: FAMILY MEDICINE CLINIC | Facility: CLINIC | Age: 53
End: 2023-05-02
Payer: COMMERCIAL

## 2023-05-02 VITALS
DIASTOLIC BLOOD PRESSURE: 100 MMHG | HEART RATE: 90 BPM | OXYGEN SATURATION: 99 % | WEIGHT: 246 LBS | BODY MASS INDEX: 36.43 KG/M2 | TEMPERATURE: 99 F | RESPIRATION RATE: 16 BRPM | SYSTOLIC BLOOD PRESSURE: 168 MMHG | HEIGHT: 69 IN

## 2023-05-02 DIAGNOSIS — M25.561 RECURRENT PAIN OF RIGHT KNEE: ICD-10-CM

## 2023-05-02 DIAGNOSIS — I10 PRIMARY HYPERTENSION: ICD-10-CM

## 2023-05-02 DIAGNOSIS — T78.3XXD ANGIOEDEMA, SUBSEQUENT ENCOUNTER: ICD-10-CM

## 2023-05-02 DIAGNOSIS — M47.816 OSTEOARTHRITIS OF LUMBAR SPINE, UNSPECIFIED SPINAL OSTEOARTHRITIS COMPLICATION STATUS: Primary | ICD-10-CM

## 2023-05-02 PROCEDURE — 99215 OFFICE O/P EST HI 40 MIN: CPT | Performed by: FAMILY MEDICINE

## 2023-05-02 PROCEDURE — 3077F SYST BP >= 140 MM HG: CPT | Performed by: FAMILY MEDICINE

## 2023-05-02 PROCEDURE — 3008F BODY MASS INDEX DOCD: CPT | Performed by: FAMILY MEDICINE

## 2023-05-02 PROCEDURE — 3080F DIAST BP >= 90 MM HG: CPT | Performed by: FAMILY MEDICINE

## 2023-05-02 RX ORDER — HYDROCHLOROTHIAZIDE 25 MG/1
25 TABLET ORAL DAILY
Qty: 90 TABLET | Refills: 1 | Status: SHIPPED | OUTPATIENT
Start: 2023-05-02 | End: 2023-05-02

## 2023-05-02 RX ORDER — HYDROCODONE BITARTRATE AND ACETAMINOPHEN 10; 325 MG/1; MG/1
1 TABLET ORAL EVERY 8 HOURS PRN
Qty: 90 TABLET | Refills: 0 | Status: SHIPPED | OUTPATIENT
Start: 2023-05-02 | End: 2023-06-01

## 2023-05-02 RX ORDER — HYDROCHLOROTHIAZIDE 25 MG/1
25 TABLET ORAL DAILY
Qty: 90 TABLET | Refills: 1 | Status: SHIPPED | OUTPATIENT
Start: 2023-05-02

## 2023-05-02 RX ORDER — PREDNISONE 1 MG/1
TABLET ORAL
Qty: 90 TABLET | Refills: 0 | Status: SHIPPED | OUTPATIENT
Start: 2023-05-02 | End: 2023-05-02

## 2023-05-02 RX ORDER — PREDNISONE 1 MG/1
TABLET ORAL
Qty: 90 TABLET | Refills: 0 | Status: SHIPPED | OUTPATIENT
Start: 2023-05-02

## 2023-05-02 NOTE — PATIENT INSTRUCTIONS
Thank you for choosing Jose Raul Sloan MD at [place] [address]  To Do: Joao Tee  1. Please take meds as directed. [facility] is located in [address]. Monday, Tuesday & Friday - 8 a.m. to 4 p.m. Wednesday, Thursday - 7 a.m. to 3 p.m. The lab is closed daily from 12 p.m.-12:30 p.m. Saturday lab hours by appointment. Call [phone] to schedule the appointment. Please signup for Qloud, which is electronic access to your record if you have not done so. All your results will post on there. [email or web address]   You can NOW use Qloud to book your appointments with us, or consider using open access scheduling which is through the edward website [email or web address] and type in Jose Raul Sloan MD and follow the links for \"Schedule Online Now\"    To schedule Imaging or tests at [facility] Scheduling [phone], Go to [facility] A ER Building (For example: CT scans, X rays, Ultrasound, MRI)  Cardiac Testing in ER building Building A second floor Cardiac Testing [phone] (For example: Holter Monitor, Cardiac Stress tests,Event Monitor, or 2D Echocardiograms)  [facility] call [phone] usually in dg A  Walk in Clinic in [place] at [facility]. [address] Mon-Fri at 8am-7:30 p.m., and Sat/Sun 9:00a. m.-4:30 p.m. Also at [address]  Call [phone] for info     Please call our office about any questions regarding your treatment/medicines/tests as a result of today's visit. For your safety, read the entire package insert of all medicines prescribed to you and be aware of all of the risks of treatment even beyond those discussed today. All therapies have potential risk of harm or side effects or medication interactions.   It is your duty and for your safety to discuss with the pharmacist and our office with questions, and to notify us and stop treatment if problems arise, but know that our intention is that the benefits outweigh those potential risks and we strive to make you healthier and to improve your quality of life. Referrals, and Radiology Information:    If your insurance requires a referral to a specialist, please allow 5 business days to process your referral request.    If Jaden French MD orders a CT or MRI, it may take up to 10 business days to receive approval from your insurance company. Once our office has called informing you that the insurance company approved your testing, please call Central Scheduling at [phone]  Please allow our office 5 business days to contact you regarding any testing results. Refill policies:   Allow 3 business days for refills; controlled substances may take longer and must be picked up from the office in person. Narcotic medications can only be filled in 30 day increments and must be refilled at an office visit only. If your prescription is due for a refill, you may be due for a follow-up appointment. We cannot refill your maintenance medications at a preventative wellness visit. To best provide you care, patients receiving maintenance medications need to be seen at least twice a year.

## 2023-05-16 ENCOUNTER — TELEMEDICINE (OUTPATIENT)
Dept: FAMILY MEDICINE CLINIC | Facility: CLINIC | Age: 53
End: 2023-05-16
Payer: COMMERCIAL

## 2023-05-16 DIAGNOSIS — R51.9 NONINTRACTABLE HEADACHE, UNSPECIFIED CHRONICITY PATTERN, UNSPECIFIED HEADACHE TYPE: ICD-10-CM

## 2023-05-16 DIAGNOSIS — I10 PRIMARY HYPERTENSION: Primary | ICD-10-CM

## 2023-05-16 PROCEDURE — 99214 OFFICE O/P EST MOD 30 MIN: CPT | Performed by: FAMILY MEDICINE

## 2023-05-16 RX ORDER — HYDROCODONE BITARTRATE AND ACETAMINOPHEN 10; 325 MG/1; MG/1
1-2 TABLET ORAL EVERY 8 HOURS PRN
Qty: 90 TABLET | Refills: 0 | Status: SHIPPED | OUTPATIENT
Start: 2023-05-16 | End: 2023-06-15

## 2023-05-16 RX ORDER — METOPROLOL SUCCINATE 25 MG/1
25 TABLET, EXTENDED RELEASE ORAL DAILY
Qty: 30 TABLET | Refills: 1 | Status: SHIPPED | OUTPATIENT
Start: 2023-05-16

## 2023-05-16 NOTE — PATIENT INSTRUCTIONS
Thank you for choosing Harry Duran MD at [place] [address]  To Do: Oanh Thomas  1. High Blood pressure  What Is a Normal Blood Pressure? The Joint National Committee on Prevention, Detection, Evaluation, and Treatment of High Blood Pressure has classified blood pressure measurements into several categories:  Normal blood pressure is systolic pressure less than 846 and diastolic pressure less than 80 mmHg. \"Prehypertension\" is systolic pressure of 643-013 or diastolic pressure of 86-55 mmHg. Stage 1 Hypertension is blood pressure greater than systolic pressure of 490-109 or diastolic pressure of 05-62 mmHg or greater. Stage 2 Hypertension is systolic pressure of 904 or greater or diastolic pressure of 842 or greater. What Health Problems Are Associated With High Blood Pressure? Several potentially serious health conditions are linked to high blood pressure, including: Atherosclerosis: a disease of the arteries caused by a buildup of plaque, or fatty material, on the inside walls of the blood vessels; hypertension contributes to this buildup by putting added stress and force on the artery walls. Heart Disease: Heart failure (the heart is not strong enough to pump blood adequately), ischemic heart disease (the heart tissue doesn't get enough blood), and hypertensive hypertrophic cardiomyopathy (thickened, abnormally functioning heart muscle) are all associated with high blood pressure. Kidney Disease: Hypertension can damage the blood vessels and filters in the kidneys, so that the kidneys cannot excrete waste properly. Stroke: Hypertension can lead to stroke, either by contributing to the process of atherosclerosis (which can lead to blockages and/or clots), or by weakening the blood vessel wall and causing it to rupture. Eye Disease: Hypertension can damage the very small blood vessels in the retina.   Bleeding from the aorta, the large blood vessel that supplies blood to the abdomen, pelvis, and legs   Heart failure   Poor blood supply to the legs  Erectile Dysfunction  Problems with your vision    Overview  \"Blood pressure\" is the force of blood pushing against the walls of the arteries as the heart pumps blood. If this pressure rises and stays high over time, it can damage the body in many ways. About 1 in 3 adults in the [place] has HBP. The condition itself usually has no signs or symptoms. You can have it for years without knowing it. During this time, though, HBP can damage your heart, blood vessels, kidneys, and other parts of your body. Knowing your blood pressure numbers is important, even when you're feeling fine. If your blood pressure is normal, you can work with your health care team to keep it that way. If your blood pressure is too high, treatment may help prevent damage to your body's organs. By [facility] staff   DASH stands for Dietary Approaches to Stop Hypertension. The DASH diet is a lifelong approach to healthy eating that's designed to help treat or prevent high blood pressure (hypertension). The DASH diet encourages you to reduce the sodium in your diet and eat a variety of foods rich in nutrients that help lower blood pressure, such as potassium, calcium and magnesium. By following the DASH diet, you may be able to reduce your blood pressure by a few points in just two weeks. Over time, your blood pressure could drop by eight to 14 points, which can make a significant difference in your health risks. DASH DIET  The low-salt Dietary Approaches to Stop Hypertension (DASH) diet is proven to help lower blood pressure. Its effects on blood pressure are sometimes seen within a few weeks. This diet is not only rich in important nutrients and fiber, but it also includes foods that contain far more potassium (4,700 milligrams (mg)/day), calcium (1,250 mg/day), and magnesium (500 mg/day) and much less sodium (salt) than the typical American diet.   Limit sodium to no more than 2,300 mg a day (eating only 1,500 mg a day is an even better goal). Reduce saturated fat to no more than 6% of daily calories and total fat to 27% of daily calories. Low-fat dairy products appear to be especially beneficial for lowering systolic blood pressure. When choosing fats, select monounsaturated oils, such as olive or canola oils. Choose whole grains over white flour or pasta products. Choose fresh fruits and vegetables every day. Many of these foods are rich in potassium, fiber, or both. Eat nuts, seeds, or legumes (dried beans or peas) daily. Choose modest amounts of protein (no more than 18% of total daily calories). Fish, skinless poultry, and soy products are the best protein sources. Other daily nutrient goals in the DASH diet include limiting carbohydrates to 55% of daily calories and dietary cholesterol to 150 mg. Try to get at least 30 grams (g) of daily fiber. Grains (6 to 8 servings a day)  Grains include bread, cereal, rice and pasta. Examples of one serving of grains include 1 slice whole-wheat bread, 1 ounce (oz.) dry cereal, or 1/2 cup cooked cereal, rice or pasta. Focus on whole grains because they have more fiber and nutrients than do refined grains. For instance, use brown rice instead of white rice, whole-wheat pasta instead of regular pasta and whole-grain bread instead of white bread. Look for products labeled \"100 percent whole grain\" or \"100 percent whole wheat. \"   Grains are naturally low in fat, so avoid spreading on butter or adding cream and cheese sauces. Vegetables (4 to 5 servings a day)  Tomatoes, carrots, broccoli, sweet potatoes, greens and other vegetables are full of fiber, vitamins, and such minerals as potassium and magnesium. Examples of one serving include 1 cup raw leafy green vegetables or 1/2 cup cut-up raw or cooked vegetables.    Don't think of vegetables only as side dishes -- a hearty blend of vegetables served over brown rice or whole-wheat noodles can serve as the main dish for a meal.   Fresh or frozen vegetables are both good choices. When buying frozen and canned vegetables, choose those labeled as low sodium or without added salt. To increase the number of servings you fit in daily, be creative. In a stir-lim, for instance, cut the amount of meat in half and double up on the vegetables. Fruits (4 to 5 servings a day)  Many fruits need little preparation to become a healthy part of a meal or snack. Like vegetables, they're packed with fiber, potassium and magnesium and are typically low in fat -- exceptions include avocados and coconuts. Examples of one serving include 1 medium fruit or 1/2 cup fresh, frozen or canned fruit. Have a piece of fruit with meals and one as a snack, then round out your day with a dessert of fresh fruits topped with a splash of low-fat yogurt. Leave on edible peels whenever possible. The peels of apples, pears and most fruits with pits add interesting texture to recipes and contain healthy nutrients and fiber. Remember that citrus fruits and juice, such as grapefruit, can interact with certain medications, so check with your doctor or pharmacist to see if they're OK for you. Dairy (2 to 3 servings a day)  Milk, yogurt, cheese and other dairy products are major sources of calcium, vitamin D and protein. But the key is to make sure that you choose dairy products that are low-fat or fat-free because otherwise they can be a major source of fat. Examples of one serving include 1 cup skim or 1% milk, 1 cup yogurt or 1 1/2 oz. cheese. Low-fat or fat-free frozen yogurt can help you boost the amount of dairy products you eat while offering a sweet treat. Add fruit for a healthy twist.   If you have trouble digesting dairy products, choose lactose-free products or consider taking an over-the-counter product that contains the enzyme lactase, which can reduce or prevent the symptoms of lactose intolerance. Go easy on regular and even fat-free cheeses because they are typically high in sodium. Lean meat, poultry and fish (6 or fewer servings a day)  Meat can be a rich source of protein, B vitamins, iron and zinc. But because even lean varieties contain fat and cholesterol, don't make them a mainstay of your diet -- cut back typical meat portions by one-third or one-half and pile on the vegetables instead. Examples of one serving include 1 oz. cooked skinless poultry, seafood or lean meat, 1 egg, or 1 oz. water-packed, no-salt-added canned tuna. Trim away skin and fat from meat and then broil, grill, roast or poach instead of frying. Eat heart-healthy fish, such as salmon, herring and tuna. These types of fish are high in omega-3 fatty acids, which can help lower your total cholesterol. Nuts, seeds and legumes (4 to 5 servings a week)   Almonds, sunflower seeds, kidney beans, peas, lentils and other foods in this family are good sources of magnesium, potassium and protein. They're also full of fiber and phytochemicals, which are plant compounds that may protect against some cancers and cardiovascular disease. Serving sizes are small and are intended to be consumed weekly because these foods are high in calories. Examples of one serving include 1/3 cup (1 1/2 oz.) nuts, 2 tablespoons seeds or 1/2 cup cooked beans or peas. Nuts sometimes get a bad rap because of their fat content, but they contain healthy types of fat -- monounsaturated fat and omega-3 fatty acids. They're high in calories, however, so eat them in moderation. Try adding them to stir-fries, salads or cereals. Soybean-based products, such as tofu and tempeh, can be a good alternative to meat because they contain all of the amino acids your body needs to make a complete protein, just like meat. They also contain isoflavones, a type of natural plant compound (phytochemical) that has been shown to have some health benefits.    Fats and oils (2 to 3 servings a day)  Fat helps your body absorb essential vitamins and helps your body's immune system. But too much fat increases your risk of heart disease, diabetes and obesity. The DASH diet strives for a healthy balance by providing 30 percent or less of daily calories from fat, with a focus on the healthier unsaturated fats. Examples of one serving include 1 teaspoon soft margarine, 1 tablespoon low-fat mayonnaise or 2 tablespoons light salad dressing. Saturated fat and trans fat are the main dietary culprits in raising your blood cholesterol and increasing your risk of coronary artery disease. DASH helps keep your daily saturated fat to less than 10 percent of your total calories by limiting use of meat, butter, cheese, whole milk, cream and eggs in your diet, along with foods made from lard, solid shortenings, and palm and coconut oils. Avoid trans fat, commonly found in such processed foods as crackers, baked goods and fried items. Read food labels on margarine and salad dressing so that you can choose those that are lowest in saturated fat and free of trans fat. Sweets (5 or fewer a week)  You don't have to banish sweets entirely while following the DASH diet -- just go easy on them. Examples of one serving include 1 tablespoon sugar, jelly or jam, 1/2 cup sorbet or 1 cup (8 oz.) lemonade. When you eat sweets, choose those that are fat-free or low-fat, such as sorbets, fruit ices, jelly beans, hard candy, baljinder crackers or low-fat cookies. Artificial sweeteners such as aspartame (NutraSweet, Equal) and sucralose (Splenda) may help satisfy your sweet tooth while sparing the sugar. But remember that you still must use them sensibly. It's OK to swap a diet cola for a regular cola, but not in place of a more nutritious beverage such as low-fat milk or even plain water. Cut back on added sugar, which has no nutritional value but can pack on calories.    DASH diet: Alcohol and caffeine  Drinking too much alcohol can increase blood pressure. The DASH diet recommends that men limit alcohol to two or fewer drinks a day and women one or less. The DASH diet doesn't address caffeine consumption. The influence of caffeine on blood pressure remains unclear. But caffeine can cause your blood pressure to rise at least temporarily. If you already have high blood pressure or if you think caffeine is affecting your blood pressure, talk to your doctor about your caffeine consumption. The DASH diet is not designed to promote weight loss, but it can be used as part of an overall weight-loss strategy. The DASH diet is based on a diet of about 2,000 calories a day. If you're trying to lose weight, though, you may want to eat around 1,600 a day. You may need to adjust your serving goals based on your health or individual circumstances -- something your health care team can help you decide. Tips to cut back on sodium  The foods at the core of the DASH diet are naturally low in sodium. So just by following the DASH diet, you're likely to reduce your sodium intake. You also can cut back on sodium in your diet by:   Using sodium-free spices or flavorings with your food instead of salt   Not adding salt when cooking rice, pasta or hot cereal   Rinsing canned foods to remove some of the sodium   Buying foods labeled \"no salt added,\" \"sodium-free,\" \"low sodium\" or \"very low sodium\"   One teaspoon of table salt has about 2,300 mg of sodium, and 2/3 teaspoon of table salt has about 1,500 mg of sodium. When you read food labels, you may be surprised at just how much sodium some processed foods contain. Even low-fat soups, canned vegetables, ready-to-eat cereals and sliced turkey from the local deli -- all foods you may have considered healthy -- often have lots of sodium. You may not notice a difference in taste when you choose low-sodium food and beverages.  If things seem too bland, gradually introduce low-sodium foods and cut back on table salt until you reach your sodium goal. That'll give your palate time to adjust. It can take several weeks for your taste buds to get used to less salty foods. [facility] is located in [address]. Monday, Tuesday & Friday - 8 a.m. to 4 p.m. Wednesday, Thursday - 7 a.m. to 3 p.m. The lab is closed daily from 12 p.m.-12:30 p.m. Saturday lab hours by appointment. Call [phone] to schedule the appointment. Please signup for NineSixFive, which is electronic access to your record if you have not done so. All your results will post on there. [email or web address]   You can NOW use NineSixFive to book your appointments with us, or consider using open access scheduling which is through the edward website [email or web address] and type in Leah Frank MD and follow the links for \"Schedule Online Now\"    To schedule Imaging or tests at [facility] Scheduling [phone], Go to [facility] A ER Building (For example: CT scans, X rays, Ultrasound, MRI)  Cardiac Testing in ER building Building A second floor Cardiac Testing [phone] (For example: Holter Monitor, Cardiac Stress tests,Event Monitor, or 2D Echocardiograms)  [facility] call [phone] usually in dg A  Walk in Clinic in [place] at [facility]. [address] Mon-Fri at 8am-7:30 p.m., and Sat/Sun 9:00a. m.-4:30 p.m. Also at [address]  Call [phone] for info     Please call our office about any questions regarding your treatment/medicines/tests as a result of today's visit. For your safety, read the entire package insert of all medicines prescribed to you and be aware of all of the risks of treatment even beyond those discussed today. All therapies have potential risk of harm or side effects or medication interactions.   It is your duty and for your safety to discuss with the pharmacist and our office with questions, and to notify us and stop treatment if problems arise, but know that our intention is that the benefits outweigh those potential risks and we strive to make you healthier and to improve your quality of life. Referrals, and Radiology Information:    If your insurance requires a referral to a specialist, please allow 5 business days to process your referral request.    If Phil Simon MD orders a CT or MRI, it may take up to 10 business days to receive approval from your insurance company. Once our office has called informing you that the insurance company approved your testing, please call Central Scheduling at [phone]  Please allow our office 5 business days to contact you regarding any testing results. Refill policies:   Allow 3 business days for refills; controlled substances may take longer and must be picked up from the office in person. Narcotic medications can only be filled in 30 day increments and must be refilled at an office visit only. If your prescription is due for a refill, you may be due for a follow-up appointment. We cannot refill your maintenance medications at a preventative wellness visit. To best provide you care, patients receiving maintenance medications need to be seen at least twice a year.

## 2023-05-18 DIAGNOSIS — S83.241A TEAR OF MEDIAL MENISCUS OF RIGHT KNEE, UNSPECIFIED TEAR TYPE, UNSPECIFIED WHETHER OLD OR CURRENT TEAR, INITIAL ENCOUNTER: ICD-10-CM

## 2023-05-18 DIAGNOSIS — G89.4 CHRONIC PAIN SYNDROME: ICD-10-CM

## 2023-05-18 DIAGNOSIS — S83.511A RUPTURE OF ANTERIOR CRUCIATE LIGAMENT OF RIGHT KNEE, INITIAL ENCOUNTER: ICD-10-CM

## 2023-05-18 DIAGNOSIS — G89.29 CHRONIC PAIN OF RIGHT KNEE: ICD-10-CM

## 2023-05-18 DIAGNOSIS — M25.561 CHRONIC PAIN OF RIGHT KNEE: ICD-10-CM

## 2023-05-18 DIAGNOSIS — M47.816 OSTEOARTHRITIS OF LUMBAR SPINE, UNSPECIFIED SPINAL OSTEOARTHRITIS COMPLICATION STATUS: ICD-10-CM

## 2023-05-18 RX ORDER — TRAMADOL HYDROCHLORIDE 50 MG/1
TABLET ORAL
Qty: 240 TABLET | Refills: 0 | Status: SHIPPED | OUTPATIENT
Start: 2023-05-18

## 2023-05-22 ENCOUNTER — TELEPHONE (OUTPATIENT)
Dept: FAMILY MEDICINE CLINIC | Facility: CLINIC | Age: 53
End: 2023-05-22

## 2023-05-31 ENCOUNTER — TELEMEDICINE (OUTPATIENT)
Dept: FAMILY MEDICINE CLINIC | Facility: CLINIC | Age: 53
End: 2023-05-31
Payer: COMMERCIAL

## 2023-05-31 DIAGNOSIS — J02.9 SORE THROAT: Primary | ICD-10-CM

## 2023-05-31 DIAGNOSIS — I10 PRIMARY HYPERTENSION: ICD-10-CM

## 2023-05-31 PROCEDURE — 99214 OFFICE O/P EST MOD 30 MIN: CPT | Performed by: FAMILY MEDICINE

## 2023-05-31 RX ORDER — METOPROLOL SUCCINATE 50 MG/1
50 TABLET, EXTENDED RELEASE ORAL DAILY
Qty: 90 TABLET | Refills: 1 | Status: SHIPPED | OUTPATIENT
Start: 2023-05-31

## 2023-05-31 RX ORDER — AZITHROMYCIN 250 MG/1
TABLET, FILM COATED ORAL
Qty: 6 TABLET | Refills: 0 | Status: SHIPPED | OUTPATIENT
Start: 2023-05-31 | End: 2023-06-05

## 2023-05-31 RX ORDER — HYDROCODONE BITARTRATE AND ACETAMINOPHEN 10; 325 MG/1; MG/1
1-2 TABLET ORAL EVERY 8 HOURS PRN
Qty: 90 TABLET | Refills: 0 | Status: SHIPPED | OUTPATIENT
Start: 2023-05-31 | End: 2023-06-30

## 2023-05-31 NOTE — PATIENT INSTRUCTIONS
Thank you for choosing Alan Fontaine MD at [place] [address]  To Do: Nidhi Tamez  1. Please take meds as directed. [facility] is located in [address]. Monday, Tuesday & Friday - 8 a.m. to 4 p.m. Wednesday, Thursday - 7 a.m. to 3 p.m. The lab is closed daily from 12 p.m.-12:30 p.m. Saturday lab hours by appointment. Call [phone] to schedule the appointment. Please signup for Simalaya, which is electronic access to your record if you have not done so. All your results will post on there. [email or web address]   You can NOW use Simalaya to book your appointments with us, or consider using open access scheduling which is through the edward website [email or web address] and type in Alan Fontaine MD and follow the links for \"Schedule Online Now\"    To schedule Imaging or tests at [facility] Scheduling [phone], Go to [facility] A ER Building (For example: CT scans, X rays, Ultrasound, MRI)  Cardiac Testing in ER building Building A second floor Cardiac Testing [phone] (For example: Holter Monitor, Cardiac Stress tests,Event Monitor, or 2D Echocardiograms)  [facility] call [phone] usually in [facility] A  Walk in Clinic in [facility] at [facility]. [address] Mon-Fri at 8am-7:30 p.m., and Sat/Sun 9:00a. m.-4:30 p.m. Also at [address] [facility]  Call [phone] for info     Please call our office about any questions regarding your treatment/medicines/tests as a result of today's visit. For your safety, read the entire package insert of all medicines prescribed to you and be aware of all of the risks of treatment even beyond those discussed today. All therapies have potential risk of harm or side effects or medication interactions.   It is your duty and for your safety to discuss with the pharmacist and our office with questions, and to notify us and stop treatment if problems arise, but know that our intention is that the benefits outweigh those potential risks and we strive to make you healthier and to improve your quality of life. Referrals, and Radiology Information:    If your insurance requires a referral to a specialist, please allow 5 business days to process your referral request.    If Didi Jimenez MD orders a CT or MRI, it may take up to 10 business days to receive approval from your insurance company. Once our office has called informing you that the insurance company approved your testing, please call Central Scheduling at [phone]  Please allow our office 5 business days to contact you regarding any testing results. Refill policies:   Allow 3 business days for refills; controlled substances may take longer and must be picked up from the office in person. Narcotic medications can only be filled in 30 day increments and must be refilled at an office visit only. If your prescription is due for a refill, you may be due for a follow-up appointment. We cannot refill your maintenance medications at a preventative wellness visit. To best provide you care, patients receiving maintenance medications need to be seen at least twice a year.

## 2023-05-31 NOTE — PROGRESS NOTES
Subjective:   Patient ID: Maine Mckeon is a 47year old female. HPI  Ms. Axel Barcenas is a pleasant 31-year-old female presents for video visit today for sore throat and congestion for the past several days. No fever no chest pain or shortness of breath. She also notices that her blood pressure remains Elevated especially when she sits. Blood pressure 1 been elevated between 150s to 160s. She has been taking metoprolol 25 mg extended release. I had reviewed past medical and family histories together with allergy and medication lists documented. History/Other:   Review of Systems   Constitutional:  Negative for fatigue and fever. HENT:  Positive for congestion and sore throat. Negative for trouble swallowing. Respiratory:  Positive for cough. Negative for shortness of breath. Cardiovascular:  Negative for chest pain. Gastrointestinal:  Negative for abdominal pain, diarrhea, nausea and vomiting. Current Outpatient Medications   Medication Sig Dispense Refill    HYDROcodone-acetaminophen (NORCO)  MG Oral Tab Take 1-2 tablets by mouth every 8 (eight) hours as needed for Pain. Ok to  today 90 tablet 0    azithromycin (ZITHROMAX Z-HERRERA) 250 MG Oral Tab Take 2 tablets (500 mg total) by mouth daily for 1 day, THEN 1 tablet (250 mg total) daily for 4 days. 6 tablet 0    metoprolol succinate ER 50 MG Oral Tablet 24 Hr Take 1 tablet (50 mg total) by mouth daily. 90 tablet 1    TRAMADOL 50 MG Oral Tab TAKE 1 OR 2 TABLETS ( MG TOTAL) BY MOUTH EVERY 4 TO 6 HOURS AS NEEDED FOR PAIN. THIS AMOUNT SHOULD LAST HER AT LEAST 30 DAYS MAY TAKE 1 DAY EARLIER 240 tablet 0    metoprolol succinate ER 25 MG Oral Tablet 24 Hr Take 1 tablet (25 mg total) by mouth daily. 30 tablet 1    predniSONE 1 MG Oral Tab TAKE 4 TABLETS BY MOUTH DAILY FOR 7 DAYS, THEN 3 TABLETS DAILY FOR 7 DAYS, THEN 2 TABLETS DAILY FOR 14 DAYS.  90 tablet 0    mupirocin 2 % External Ointment Apply 1 Application topically 3 (three) times daily. 15 g 0    CYCLOBENZAPRINE 10 MG Oral Tab TAKE 1 TABLET BY MOUTH NIGHTLY AS NEEDED FOR MUSCLE SPASMS 30 tablet 5    PREDNISONE 5 MG Oral Tab TAKE ONE TABLET EACH MORNING 30 tablet 0    predniSONE 10 MG Oral Tab TAKE 1 TABLET (10 MG TOTAL) BY MOUTH 2 (TWO) TIMES DAILY FOR 3 DAYS, THEN 1 TABLET (10 MG TOTAL) DAILY FOR 3 DAYS, THEN 1/2 TABLET (5 MG TOTAL) DAILY FOR 3 DAYS. 11 tablet 0    Ibuprofen 100 MG Oral Chew Tab       LORAZEPAM 1 MG Oral Tab TAKE 1 TABLET (1 MG TOTAL) BY MOUTH 2 (TWO) TIMES DAILY AS NEEDED FOR ANXIETY. 30 tablet 0    predniSONE 50 MG Oral Tab Take 1 tablet (50 mg total) by mouth as needed. For allergy flare-up      Albuterol Sulfate  (90 Base) MCG/ACT Inhalation Aero Soln Inhale 2 puffs into the lungs every 6 (six) hours as needed for Wheezing. 3 each 1    aspirin  MG Oral Tab EC TAKE ONE TABLET BY MOUTH ONE TIME DAILY POST OPERATIVELY      Fexofenadine HCl 180 MG Oral Tab Take 180 mg by mouth daily. Naloxone HCl 4 MG/0.1ML Nasal Liquid 4 mg by Nasal route as needed. If patient remains unresponsive, repeat dose in other nostril 2-5 minutes after first dose. 1 kit 0    famoTIDine 20 MG Oral Tab Take 1 tablet (20 mg total) by mouth 2 (two) times daily. Ketotifen Fumarate (KETOTIFEN HYDROGEN FUMARATE) Does not apply Powder 2 mg. psyllium 28 % Oral Powd Pack Take 1 packet by mouth as needed. cetirizine 10 MG Oral Tab Take 1-2 tablets (10-20 mg total) by mouth daily. caffeine 200 MG Oral Tab Take 1 tablet (200 mg total) by mouth daily. EPINEPHrine 0.3 MG/0.3ML Injection Solution Auto-injector Inject 0.3 mL (1 each total) as directed one time.        Allergies:  Hydrochlorothiazide     SWELLING  Latex                   ANAPHYLAXIS  Latex                   ANAPHYLAXIS, RASH  Morphine                SWELLING    Comment:Legs swelling  Phentermine             HIVES, SWELLING  Lorazepam               OTHER (SEE COMMENTS)    Comment:Pt  said she has an adverse reaction to             Ativan last time she had it, no sure of reaction  Penicillin G            RASH    Objective:   Physical Exam  Constitutional:       General: She is not in acute distress. Neurological:      Mental Status: She is alert. Psychiatric:         Mood and Affect: Mood normal.         Assessment & Plan:   Sore throat  (primary encounter diagnosis)  -Keep hydrated  Take Tylenol as needed for fever pain  - We will go and treat with Z-Herrera  Primary hypertension  -We will increase metoprolol to 50 mg extended release daily  - Keep monitoring blood pressure    Call or come in sooner if symptoms worsen or persist  - She did request for early refill for Norco which I sent to her pharmacy. This note was prepared using [employer] [place] [employer] voice recognition dictation software. As a result errors may occur. When identified these errors have been corrected. While every attempt is made to correct errors during dictation discrepancies may still exist          No orders of the defined types were placed in this encounter. Meds This Visit:  Requested Prescriptions     Signed Prescriptions Disp Refills    HYDROcodone-acetaminophen (NORCO)  MG Oral Tab 90 tablet 0     Sig: Take 1-2 tablets by mouth every 8 (eight) hours as needed for Pain. Ok to  today    azithromycin (ZITHROMAX Z-HERRERA) 250 MG Oral Tab 6 tablet 0     Sig: Take 2 tablets (500 mg total) by mouth daily for 1 day, THEN 1 tablet (250 mg total) daily for 4 days. metoprolol succinate ER 50 MG Oral Tablet 24 Hr 90 tablet 1     Sig: Take 1 tablet (50 mg total) by mouth daily.        Imaging & Referrals:  None

## 2023-06-15 ENCOUNTER — TELEMEDICINE (OUTPATIENT)
Dept: FAMILY MEDICINE CLINIC | Facility: CLINIC | Age: 53
End: 2023-06-15
Payer: COMMERCIAL

## 2023-06-15 DIAGNOSIS — J01.90 ACUTE SINUSITIS, RECURRENCE NOT SPECIFIED, UNSPECIFIED LOCATION: Primary | ICD-10-CM

## 2023-06-15 DIAGNOSIS — M47.816 OSTEOARTHRITIS OF LUMBAR SPINE, UNSPECIFIED SPINAL OSTEOARTHRITIS COMPLICATION STATUS: ICD-10-CM

## 2023-06-15 PROCEDURE — 99214 OFFICE O/P EST MOD 30 MIN: CPT | Performed by: FAMILY MEDICINE

## 2023-06-15 RX ORDER — PREDNISONE 10 MG/1
TABLET ORAL
Qty: 11 TABLET | Refills: 0 | Status: SHIPPED | OUTPATIENT
Start: 2023-06-15

## 2023-06-15 RX ORDER — HYDROCODONE BITARTRATE AND ACETAMINOPHEN 10; 325 MG/1; MG/1
1 TABLET ORAL EVERY 8 HOURS PRN
Qty: 90 TABLET | Refills: 0 | Status: SHIPPED | OUTPATIENT
Start: 2023-06-15 | End: 2023-07-15

## 2023-06-15 RX ORDER — AZITHROMYCIN 250 MG/1
TABLET, FILM COATED ORAL
Qty: 6 TABLET | Refills: 0 | Status: SHIPPED | OUTPATIENT
Start: 2023-06-15 | End: 2023-06-20

## 2023-06-15 NOTE — PATIENT INSTRUCTIONS
Thank you for choosing Jose Raul Sloan MD at [place] [address]  To Do: Joao Tee  1. Please take meds as directed. [facility] is located in [address]. Monday, Tuesday & Friday - 8 a.m. to 4 p.m. Wednesday, Thursday - 7 a.m. to 3 p.m. The lab is closed daily from 12 p.m.-12:30 p.m. Saturday lab hours by appointment. Call [phone] to schedule the appointment. Please signup for GateGuru, which is electronic access to your record if you have not done so. All your results will post on there. [email or web address]   You can NOW use GateGuru to book your appointments with us, or consider using open access scheduling which is through the edward website [email or web address] and type in Jose Raul Sloan MD and follow the links for \"Schedule Online Now\"    To schedule Imaging or tests at [facility] Scheduling [phone], Go to [facility] A ER Building (For example: CT scans, X rays, Ultrasound, MRI)  Cardiac Testing in ER building Building A second floor Cardiac Testing [phone] (For example: Holter Monitor, Cardiac Stress tests,Event Monitor, or 2D Echocardiograms)  [facility] call [phone] usually in [facility] A  Walk in Clinic in [place] at [facility]. [address] Mon-Fri at 8am-7:30 p.m., and Sat/Sun 9:00a. m.-4:30 p.m. Also at [address]  Call [phone] for info     Please call our office about any questions regarding your treatment/medicines/tests as a result of today's visit. For your safety, read the entire package insert of all medicines prescribed to you and be aware of all of the risks of treatment even beyond those discussed today. All therapies have potential risk of harm or side effects or medication interactions.   It is your duty and for your safety to discuss with the pharmacist and our office with questions, and to notify us and stop treatment if problems arise, but know that our intention is that the benefits outweigh those potential risks and we strive to make you healthier and to improve your quality of life. Referrals, and Radiology Information:    If your insurance requires a referral to a specialist, please allow 5 business days to process your referral request.    If Phil Simon MD orders a CT or MRI, it may take up to 10 business days to receive approval from your insurance company. Once our office has called informing you that the insurance company approved your testing, please call Central Scheduling at [phone]  Please allow our office 5 business days to contact you regarding any testing results. Refill policies:   Allow 3 business days for refills; controlled substances may take longer and must be picked up from the office in person. Narcotic medications can only be filled in 30 day increments and must be refilled at an office visit only. If your prescription is due for a refill, you may be due for a follow-up appointment. We cannot refill your maintenance medications at a preventative wellness visit. To best provide you care, patients receiving maintenance medications need to be seen at least twice a year.

## 2023-06-19 DIAGNOSIS — S83.511A RUPTURE OF ANTERIOR CRUCIATE LIGAMENT OF RIGHT KNEE, INITIAL ENCOUNTER: ICD-10-CM

## 2023-06-19 DIAGNOSIS — G89.4 CHRONIC PAIN SYNDROME: ICD-10-CM

## 2023-06-19 DIAGNOSIS — M47.816 OSTEOARTHRITIS OF LUMBAR SPINE, UNSPECIFIED SPINAL OSTEOARTHRITIS COMPLICATION STATUS: ICD-10-CM

## 2023-06-19 DIAGNOSIS — S83.241A TEAR OF MEDIAL MENISCUS OF RIGHT KNEE, UNSPECIFIED TEAR TYPE, UNSPECIFIED WHETHER OLD OR CURRENT TEAR, INITIAL ENCOUNTER: ICD-10-CM

## 2023-06-19 DIAGNOSIS — M25.561 CHRONIC PAIN OF RIGHT KNEE: ICD-10-CM

## 2023-06-19 DIAGNOSIS — G89.29 CHRONIC PAIN OF RIGHT KNEE: ICD-10-CM

## 2023-06-19 RX ORDER — TRAMADOL HYDROCHLORIDE 50 MG/1
TABLET ORAL
Qty: 240 TABLET | Refills: 0 | Status: SHIPPED | OUTPATIENT
Start: 2023-06-19

## 2023-07-11 RX ORDER — HYDROCODONE BITARTRATE AND ACETAMINOPHEN 10; 325 MG/1; MG/1
1 TABLET ORAL EVERY 8 HOURS PRN
Qty: 90 TABLET | Refills: 0 | Status: SHIPPED | OUTPATIENT
Start: 2023-07-11 | End: 2023-08-10

## 2023-07-11 NOTE — TELEPHONE ENCOUNTER
- Pt is requesting a refill. Future Appointments   Date Time Provider Miguel Nguyen   8/2/2023 10:00 AM Demarcus Casey MD [facility] [address]       Disp Refills Start End    HYDROcodone-acetaminophen (NORCO)  MG Oral Tab 90 tablet 0 6/15/2023 7/15/2023    Sig - Route: Take 1 tablet by mouth every 8 (eight) hours as needed for Pain.  Ok to  today - Oral    Sent to pharmacy as: HYDROcodone-Acetaminophen  MG Oral Tablet    Earliest Fill Date: 6/15/2023      [id number] - [place], [address] AT [address] [address] [address], [phone], [phone]

## 2023-07-17 ENCOUNTER — TELEPHONE (OUTPATIENT)
Dept: FAMILY MEDICINE CLINIC | Facility: CLINIC | Age: 53
End: 2023-07-17

## 2023-07-17 NOTE — TELEPHONE ENCOUNTER
Received FMLA  form.  signed and completed. Form faxed to [phone]. Copy sent to scan. Original placed in my blue copy accordion folder if needed.

## 2023-07-18 DIAGNOSIS — S83.241A TEAR OF MEDIAL MENISCUS OF RIGHT KNEE, UNSPECIFIED TEAR TYPE, UNSPECIFIED WHETHER OLD OR CURRENT TEAR, INITIAL ENCOUNTER: ICD-10-CM

## 2023-07-18 DIAGNOSIS — M25.561 CHRONIC PAIN OF RIGHT KNEE: ICD-10-CM

## 2023-07-18 DIAGNOSIS — M47.816 OSTEOARTHRITIS OF LUMBAR SPINE, UNSPECIFIED SPINAL OSTEOARTHRITIS COMPLICATION STATUS: ICD-10-CM

## 2023-07-18 DIAGNOSIS — G89.4 CHRONIC PAIN SYNDROME: ICD-10-CM

## 2023-07-18 DIAGNOSIS — S83.511A RUPTURE OF ANTERIOR CRUCIATE LIGAMENT OF RIGHT KNEE, INITIAL ENCOUNTER: ICD-10-CM

## 2023-07-18 DIAGNOSIS — G89.29 CHRONIC PAIN OF RIGHT KNEE: ICD-10-CM

## 2023-07-18 RX ORDER — TRAMADOL HYDROCHLORIDE 50 MG/1
TABLET ORAL
Qty: 240 TABLET | Refills: 0 | Status: SHIPPED | OUTPATIENT
Start: 2023-07-18

## 2023-07-18 NOTE — TELEPHONE ENCOUNTER
Patient is requesting a refill on: Tramadol 50 mg # 240  Last LOV: Acute only telemedicine visits.  Last In Office visit 5/2/23  Last Refill: 6/21/23      Non- protocol Medication  RX pended and routed to provider for approval

## 2023-07-26 DIAGNOSIS — E78.00 ELEVATED LDL CHOLESTEROL LEVEL: Primary | ICD-10-CM

## 2023-07-26 LAB
ABSOLUTE BASOPHILS: 31 CELLS/UL (ref 0–200)
ABSOLUTE EOSINOPHILS: 172 CELLS/UL (ref 15–500)
ABSOLUTE LYMPHOCYTES: 1856 CELLS/UL (ref 850–3900)
ABSOLUTE MONOCYTES: 429 CELLS/UL (ref 200–950)
ABSOLUTE NEUTROPHILS: 5312 CELLS/UL (ref 1500–7800)
ALBUMIN/GLOBULIN RATIO: 1.6 (CALC) (ref 1–2.5)
ALBUMIN: 4.4 G/DL (ref 3.6–5.1)
ALKALINE PHOSPHATASE: 85 U/L (ref 37–153)
ALT: 16 U/L (ref 6–29)
AST: 14 U/L (ref 10–35)
BASOPHILS: 0.4 %
BILIRUBIN, TOTAL: 0.6 MG/DL (ref 0.2–1.2)
BUN: 15 MG/DL (ref 7–25)
CALCIUM: 9.9 MG/DL (ref 8.6–10.4)
CARBON DIOXIDE: 25 MMOL/L (ref 20–32)
CHLORIDE: 103 MMOL/L (ref 98–110)
CHOL/HDLC RATIO: 3.8 (CALC)
CHOLESTEROL, TOTAL: 272 MG/DL
CREATININE: 0.72 MG/DL (ref 0.5–1.03)
EGFR: 101 ML/MIN/1.73M2
EOSINOPHILS: 2.2 %
GLOBULIN: 2.8 G/DL (CALC) (ref 1.9–3.7)
GLUCOSE: 92 MG/DL (ref 65–139)
HDL CHOLESTEROL: 72 MG/DL
HEMATOCRIT: 43.1 % (ref 35–45)
HEMOGLOBIN: 14.5 G/DL (ref 11.7–15.5)
LDL-CHOLESTEROL: 158 MG/DL (CALC)
LYMPHOCYTES: 23.8 %
MCH: 30.2 PG (ref 27–33)
MCHC: 33.6 G/DL (ref 32–36)
MCV: 89.8 FL (ref 80–100)
MONOCYTES: 5.5 %
MPV: 11.2 FL (ref 7.5–12.5)
NEUTROPHILS: 68.1 %
NON-HDL CHOLESTEROL: 200 MG/DL (CALC)
PLATELET COUNT: 298 THOUSAND/UL (ref 140–400)
POTASSIUM: 4.4 MMOL/L (ref 3.5–5.3)
PROTEIN, TOTAL: 7.2 G/DL (ref 6.1–8.1)
RDW: 12.1 % (ref 11–15)
RED BLOOD CELL COUNT: 4.8 MILLION/UL (ref 3.8–5.1)
SODIUM: 137 MMOL/L (ref 135–146)
T4, FREE: 1.1 NG/DL (ref 0.8–1.8)
TRIGLYCERIDES: 246 MG/DL
TSH: 1.6 MIU/L
WHITE BLOOD CELL COUNT: 7.8 THOUSAND/UL (ref 3.8–10.8)

## 2023-07-26 RX ORDER — ROSUVASTATIN CALCIUM 10 MG/1
10 TABLET, COATED ORAL NIGHTLY
Qty: 90 TABLET | Refills: 0 | Status: SHIPPED | OUTPATIENT
Start: 2023-07-26

## 2023-08-02 ENCOUNTER — TELEPHONE (OUTPATIENT)
Dept: FAMILY MEDICINE CLINIC | Facility: CLINIC | Age: 53
End: 2023-08-02

## 2023-08-02 ENCOUNTER — OFFICE VISIT (OUTPATIENT)
Dept: FAMILY MEDICINE CLINIC | Facility: CLINIC | Age: 53
End: 2023-08-02
Payer: COMMERCIAL

## 2023-08-02 VITALS
DIASTOLIC BLOOD PRESSURE: 78 MMHG | HEART RATE: 94 BPM | TEMPERATURE: 98 F | WEIGHT: 250 LBS | HEIGHT: 69 IN | RESPIRATION RATE: 16 BRPM | SYSTOLIC BLOOD PRESSURE: 138 MMHG | BODY MASS INDEX: 37.03 KG/M2 | OXYGEN SATURATION: 98 %

## 2023-08-02 DIAGNOSIS — M47.816 OSTEOARTHRITIS OF LUMBAR SPINE, UNSPECIFIED SPINAL OSTEOARTHRITIS COMPLICATION STATUS: Primary | ICD-10-CM

## 2023-08-02 DIAGNOSIS — Z12.31 ENCOUNTER FOR SCREENING MAMMOGRAM FOR MALIGNANT NEOPLASM OF BREAST: ICD-10-CM

## 2023-08-02 DIAGNOSIS — I10 PRIMARY HYPERTENSION: ICD-10-CM

## 2023-08-02 DIAGNOSIS — M47.816 OSTEOARTHRITIS OF LUMBAR SPINE, UNSPECIFIED SPINAL OSTEOARTHRITIS COMPLICATION STATUS: ICD-10-CM

## 2023-08-02 DIAGNOSIS — E78.5 DYSLIPIDEMIA: ICD-10-CM

## 2023-08-02 PROCEDURE — 99214 OFFICE O/P EST MOD 30 MIN: CPT | Performed by: FAMILY MEDICINE

## 2023-08-02 PROCEDURE — 3008F BODY MASS INDEX DOCD: CPT | Performed by: FAMILY MEDICINE

## 2023-08-02 PROCEDURE — 3078F DIAST BP <80 MM HG: CPT | Performed by: FAMILY MEDICINE

## 2023-08-02 PROCEDURE — 3075F SYST BP GE 130 - 139MM HG: CPT | Performed by: FAMILY MEDICINE

## 2023-08-02 RX ORDER — HYDROCODONE BITARTRATE AND ACETAMINOPHEN 10; 325 MG/1; MG/1
1-2 TABLET ORAL EVERY 8 HOURS PRN
Qty: 90 TABLET | Refills: 0 | Status: SHIPPED | OUTPATIENT
Start: 2023-09-02 | End: 2023-08-02

## 2023-08-02 RX ORDER — HYDROCODONE BITARTRATE AND ACETAMINOPHEN 10; 325 MG/1; MG/1
1-2 TABLET ORAL EVERY 8 HOURS PRN
Qty: 90 TABLET | Refills: 0 | Status: SHIPPED | OUTPATIENT
Start: 2023-08-02

## 2023-08-02 RX ORDER — HYDROCODONE BITARTRATE AND ACETAMINOPHEN 10; 325 MG/1; MG/1
1-2 TABLET ORAL EVERY 8 HOURS PRN
Qty: 90 TABLET | Refills: 0 | Status: SHIPPED | OUTPATIENT
Start: 2023-08-02 | End: 2023-08-02

## 2023-08-02 RX ORDER — HYDROCODONE BITARTRATE AND ACETAMINOPHEN 10; 325 MG/1; MG/1
1-2 TABLET ORAL EVERY 8 HOURS PRN
Qty: 90 TABLET | Refills: 0 | Status: CANCELLED | OUTPATIENT
Start: 2023-08-02 | End: 2023-09-01

## 2023-08-02 RX ORDER — HYDROCODONE BITARTRATE AND ACETAMINOPHEN 10; 325 MG/1; MG/1
1-2 TABLET ORAL EVERY 8 HOURS PRN
Qty: 90 TABLET | Refills: 0 | Status: SHIPPED | OUTPATIENT
Start: 2023-10-03 | End: 2023-08-02

## 2023-08-02 NOTE — TELEPHONE ENCOUNTER
Heide Franklin Pt is calling to request change to rx as pharmacy will not fill until removed from rx. Pharmacy would like The quantity dispensed should be 30 days removed from script for today. Then the pharmacy will fill. Also it is ok on next script to keep that on rx. Pharmacy will fill today once that is taken off rx. Patient also knows this should last 30 days. Sig:   Take 1-2 tablets by mouth every 8 (eight) hours as needed for Pain. Route:   Oral     Note to Pharmacy:   The quantity dispensed should last 30 days. Luigi Franz is responsible for safekeeping of all of her unfilled prescriptions as well as her previously dispensed medications. PRN Reason(s):   Pain     Earliest Fill Date:   8/2/2023       If you have any questions call.   [address]. [phone]

## 2023-08-02 NOTE — TELEPHONE ENCOUNTER
Pharmacy called regarding pt's Rx for Norco. Pt picked up Rx on 7/13/23 for #90 (30 day supply). Pharmacist needs to know the exact date for dose increase in order for them to calculate when pt can  Rx. They will not dispense any earlier than 2 days without date of dose increase.

## 2023-08-02 NOTE — PROGRESS NOTES
Subjective:   Patient ID: Lizzy Asher is a 47year old female. HPI  Ms. Deirdre Hussein is a pleasant 28-year-old female with history of chronic pain syndrome secondary to lumbar degenerative joint disease, status post right knee arthroscopy for torn meniscus and history of hives here today for her follow-up appointment. She is required to have her Norco changed to 1-2 tablets. She has been having more back and knee pain as of recent-they have a large dog that they have been taking care of and they have to carry it. She also takes tramadol for pain control. Recently she had 1 episode of rectal bleeding and had questions if she could use her Cologuard. Currently she does not have rectal bleeding. She also reports that she has been having back pain which would radiate to her right lower quadrant. She has had pelvic surgery a few years ago. She thinks that there is something sticking out of her vaginal area. I had reviewed past medical and family histories together with allergy and medication lists documented. History/Other:   Review of Systems  Constitutional: Negative for fatigue and fever. HENT: Negative for sore throat and trouble swallowing. Respiratory: Negative for cough. Gastrointestinal: Negative for abdominal pain, diarrhea, nausea and vomiting. Musculoskeletal: Positive for arthralgias, back pain and joint swelling. Current Outpatient Medications   Medication Sig Dispense Refill    HYDROcodone-acetaminophen  MG Oral Tab Take 1-2 tablets by mouth every 8 (eight) hours as needed for Pain. 90 tablet 0    [START ON 9/2/2023] HYDROcodone-acetaminophen  MG Oral Tab Take 1-2 tablets by mouth every 8 (eight) hours as needed for Pain. 90 tablet 0    [START ON 10/3/2023] HYDROcodone-acetaminophen  MG Oral Tab Take 1-2 tablets by mouth every 8 (eight) hours as needed for Pain. 90 tablet 0    rosuvastatin 10 MG Oral Tab Take 1 tablet (10 mg total) by mouth nightly.  [address] tablet 0    traMADol 50 MG Oral Tab Take 1-2 tablets ( mg total) by mouth every 4 to 6 hours as needed for Pain. 240 tablet 0    HYDROcodone-acetaminophen (NORCO)  MG Oral Tab Take 1 tablet by mouth every 8 (eight) hours as needed for Pain. Ok to  today 90 tablet 0    predniSONE 10 MG Oral Tab TAKE 1 TABLET (10 MG TOTAL) BY MOUTH 2 (TWO) TIMES DAILY FOR 3 DAYS, THEN 1 TABLET (10 MG TOTAL) DAILY FOR 3 DAYS, THEN 1/2 TABLET (5 MG TOTAL) DAILY FOR 3 DAYS. 11 tablet 0    metoprolol succinate ER 50 MG Oral Tablet 24 Hr Take 1 tablet (50 mg total) by mouth daily. 90 tablet 1    metoprolol succinate ER 25 MG Oral Tablet 24 Hr Take 1 tablet (25 mg total) by mouth daily. 30 tablet 1    predniSONE 1 MG Oral Tab TAKE 4 TABLETS BY MOUTH DAILY FOR 7 DAYS, THEN 3 TABLETS DAILY FOR 7 DAYS, THEN 2 TABLETS DAILY FOR 14 DAYS. 90 tablet 0    mupirocin 2 % External Ointment Apply 1 Application topically 3 (three) times daily. 15 g 0    CYCLOBENZAPRINE 10 MG Oral Tab TAKE 1 TABLET BY MOUTH NIGHTLY AS NEEDED FOR MUSCLE SPASMS 30 tablet 5    PREDNISONE 5 MG Oral Tab TAKE ONE TABLET EACH MORNING 30 tablet 0    Ibuprofen 100 MG Oral Chew Tab       LORAZEPAM 1 MG Oral Tab TAKE 1 TABLET (1 MG TOTAL) BY MOUTH 2 (TWO) TIMES DAILY AS NEEDED FOR ANXIETY. 30 tablet 0    predniSONE 50 MG Oral Tab Take 1 tablet (50 mg total) by mouth as needed. For allergy flare-up      Albuterol Sulfate  (90 Base) MCG/ACT Inhalation Aero Soln Inhale 2 puffs into the lungs every 6 (six) hours as needed for Wheezing. 3 each 1    aspirin  MG Oral Tab EC TAKE ONE TABLET BY MOUTH ONE TIME DAILY POST OPERATIVELY      Fexofenadine HCl 180 MG Oral Tab Take 1 tablet (180 mg total) by mouth daily. Naloxone HCl 4 MG/0.1ML Nasal Liquid 4 mg by Nasal route as needed. If patient remains unresponsive, repeat dose in other nostril 2-5 minutes after first dose.  1 kit 0    famoTIDine 20 MG Oral Tab Take 1 tablet (20 mg total) by mouth 2 (two) times daily. Ketotifen Fumarate (KETOTIFEN HYDROGEN FUMARATE) Does not apply Powder 2 mg. psyllium 28 % Oral Powd Pack Take 1 packet by mouth as needed. cetirizine 10 MG Oral Tab Take 1-2 tablets (10-20 mg total) by mouth daily. caffeine 200 MG Oral Tab Take 1 tablet (200 mg total) by mouth daily. EPINEPHrine 0.3 MG/0.3ML Injection Solution Auto-injector Inject 0.3 mL (1 each total) as directed one time. Allergies:  Hydrochlorothiazide     SWELLING  Latex                   ANAPHYLAXIS  Latex                   ANAPHYLAXIS, RASH  Morphine                SWELLING    Comment:Legs swelling  Phentermine             HIVES, SWELLING  Lorazepam               OTHER (SEE COMMENTS)    Comment:Pt  said she has an adverse reaction to             Ativan last time she had it, no sure of reaction  Penicillin G            RASH    Objective:   Physical Exam  Vitals reviewed. Constitutional:       General: She is not in acute distress. HENT:      Mouth/Throat:      Mouth: Mucous membranes are moist.      Pharynx: Oropharynx is clear. Eyes:      General: No scleral icterus. Conjunctiva/sclera: Conjunctivae normal.   Cardiovascular:      Rate and Rhythm: Normal rate and regular rhythm. Heart sounds: Normal heart sounds. No murmur heard. Pulmonary:      Effort: Pulmonary effort is normal. No respiratory distress. Breath sounds: Normal breath sounds. No wheezing or rales. Abdominal:      General: Bowel sounds are normal.      Palpations: Abdomen is soft. Musculoskeletal:      Cervical back: Neck supple. Right lower leg: No edema. Left lower leg: No edema. Lymphadenopathy:      Cervical: No cervical adenopathy. Skin:     General: Skin is warm. Neurological:      Mental Status: She is alert.    Psychiatric:         Mood and Affect: Mood normal.         Behavior: Behavior normal.      Assessment & Plan:   Osteoarthritis of lumbar spine, unspecified spinal osteoarthritis complication status  (primary encounter diagnosis)  -Will adjust Norco to 1-2 tablets as directed but explained to her that perhaps it might be best if she just uses either Norco or tramadol for pain control and will make proper adjustments from there. She will think about this at this point. Encounter for screening mammogram for malignant neoplasm of breast  Dyslipidemia  -Will hold off from Crestor for now as she plans on making lifestyle changes soon; will monitor at this point  Primary hypertension  -Well-controlled symptom continue current meds    I did ask her to see which urologist or gynecologist will be able to see her while living in [facility]. She will reach out to us regarding this soon. Follow-up after 3 months or as needed      This note was prepared using [employer] voice recognition dictation software. As a result errors may occur. When identified these errors have been corrected. While every attempt is made to correct errors during dictation discrepancies may still exist          No orders of the defined types were placed in this encounter. Meds This Visit:  Requested Prescriptions     Signed Prescriptions Disp Refills    HYDROcodone-acetaminophen  MG Oral Tab 90 tablet 0     Sig: Take 1-2 tablets by mouth every 8 (eight) hours as needed for Pain. HYDROcodone-acetaminophen  MG Oral Tab 90 tablet 0     Sig: Take 1-2 tablets by mouth every 8 (eight) hours as needed for Pain. HYDROcodone-acetaminophen  MG Oral Tab 90 tablet 0     Sig: Take 1-2 tablets by mouth every 8 (eight) hours as needed for Pain.        Imaging & Referrals:  [facility] SCREENING [facility]ILAT (CPT=77067)

## 2023-08-02 NOTE — PATIENT INSTRUCTIONS
Thank you for choosing Jaja Carrizales MD at [place] [address]  To Do: Danica Sophia  1. Please take meds as directed. [facility] is located in [address]. Monday, Tuesday & Friday - 8 a.m. to 4 p.m. Wednesday, Thursday - 7 a.m. to 3 p.m. The lab is closed daily from 12 p.m.-12:30 p.m. Saturday lab hours by appointment. Call [phone] to schedule the appointment. Please signup for [employer], which is electronic access to your record if you have not done so. All your results will post on there. [email or web address]   You can NOW use [employer] to book your appointments with us, or consider using open access scheduling which is through the edward website [email or web address] and type in Jaja Carrizales MD and follow the links for \"Schedule Online Now\"    To schedule Imaging or tests at [facility] Scheduling [phone], Go to [facility] A ER Building (For example: CT scans, X rays, Ultrasound, MRI)  Cardiac Testing in ER building Building A second floor Cardiac Testing [phone] (For example: Holter Monitor, Cardiac Stress tests,Event Monitor, or 2D Echocardiograms)  [facility] call [phone] usually in Bldg A  Walk in Clinic in [place] at [facility]. [address] Mon-Fri at 8am-7:30 p.m., and Sat/Sun 9:00a. m.-4:30 p.m. Also at [address]  Call [phone] for info     Please call our office about any questions regarding your treatment/medicines/tests as a result of today's visit. For your safety, read the entire package insert of all medicines prescribed to you and be aware of all of the risks of treatment even beyond those discussed today. All therapies have potential risk of harm or side effects or medication interactions.   It is your duty and for your safety to discuss with the pharmacist and our office with questions, and to notify us and stop treatment if problems arise, but know that our intention is that the benefits outweigh those potential risks and we strive to make you healthier and to improve your quality of life. Referrals, and Radiology Information:    If your insurance requires a referral to a specialist, please allow 5 business days to process your referral request.    If Veda Smart MD orders a CT or MRI, it may take up to 10 business days to receive approval from your insurance company. Once our office has called informing you that the insurance company approved your testing, please call Central Scheduling at [phone]  Please allow our office 5 business days to contact you regarding any testing results. Refill policies:   Allow 3 business days for refills; controlled substances may take longer and must be picked up from the office in person. Narcotic medications can only be filled in 30 day increments and must be refilled at an office visit only. If your prescription is due for a refill, you may be due for a follow-up appointment. We cannot refill your maintenance medications at a preventative wellness visit. To best provide you care, patients receiving maintenance medications need to be seen at least twice a year.

## 2023-08-02 NOTE — TELEPHONE ENCOUNTER
[facility]    8/2/23  2:26 PM  Note  Pharmacy called regarding pt's Rx for Norco. Pt picked up Rx on 7/13/23 for #90 (30 day supply). Pharmacist needs to know the exact date for dose increase in order for them to calculate when pt can  Rx. They will not dispense any earlier than 2 days without date of dose increase.

## 2023-08-02 NOTE — TELEPHONE ENCOUNTER
[facility] called and wanted to know when Dr. Daniela Hahn did an dise increase on Norco, I sent to call to Elizabeth Zavala.

## 2023-08-02 NOTE — TELEPHONE ENCOUNTER
Per Dr. Yoshi Ferraro, dose increase date is today as that is when pt was seen and dosage was discussed.

## 2023-08-14 DIAGNOSIS — M25.561 CHRONIC PAIN OF RIGHT KNEE: ICD-10-CM

## 2023-08-14 DIAGNOSIS — M47.816 OSTEOARTHRITIS OF LUMBAR SPINE, UNSPECIFIED SPINAL OSTEOARTHRITIS COMPLICATION STATUS: ICD-10-CM

## 2023-08-14 DIAGNOSIS — G89.4 CHRONIC PAIN SYNDROME: ICD-10-CM

## 2023-08-14 DIAGNOSIS — S83.241A TEAR OF MEDIAL MENISCUS OF RIGHT KNEE, UNSPECIFIED TEAR TYPE, UNSPECIFIED WHETHER OLD OR CURRENT TEAR, INITIAL ENCOUNTER: ICD-10-CM

## 2023-08-14 DIAGNOSIS — G89.29 CHRONIC PAIN OF RIGHT KNEE: ICD-10-CM

## 2023-08-14 DIAGNOSIS — S83.511A RUPTURE OF ANTERIOR CRUCIATE LIGAMENT OF RIGHT KNEE, INITIAL ENCOUNTER: ICD-10-CM

## 2023-08-14 RX ORDER — TRAMADOL HYDROCHLORIDE 50 MG/1
TABLET ORAL
Qty: 240 TABLET | Refills: 0 | Status: SHIPPED | OUTPATIENT
Start: 2023-08-14

## 2023-08-29 DIAGNOSIS — M47.816 OSTEOARTHRITIS OF LUMBAR SPINE, UNSPECIFIED SPINAL OSTEOARTHRITIS COMPLICATION STATUS: ICD-10-CM

## 2023-08-29 RX ORDER — HYDROCODONE BITARTRATE AND ACETAMINOPHEN 10; 325 MG/1; MG/1
1-2 TABLET ORAL EVERY 8 HOURS PRN
Qty: 90 TABLET | Refills: 0 | Status: SHIPPED | OUTPATIENT
Start: 2023-08-29

## 2023-09-10 DIAGNOSIS — M47.816 OSTEOARTHRITIS OF LUMBAR SPINE, UNSPECIFIED SPINAL OSTEOARTHRITIS COMPLICATION STATUS: ICD-10-CM

## 2023-09-10 DIAGNOSIS — S83.241A TEAR OF MEDIAL MENISCUS OF RIGHT KNEE, UNSPECIFIED TEAR TYPE, UNSPECIFIED WHETHER OLD OR CURRENT TEAR, INITIAL ENCOUNTER: ICD-10-CM

## 2023-09-10 DIAGNOSIS — G89.4 CHRONIC PAIN SYNDROME: ICD-10-CM

## 2023-09-10 DIAGNOSIS — G89.29 CHRONIC PAIN OF RIGHT KNEE: ICD-10-CM

## 2023-09-10 DIAGNOSIS — M25.561 CHRONIC PAIN OF RIGHT KNEE: ICD-10-CM

## 2023-09-10 DIAGNOSIS — S83.511A RUPTURE OF ANTERIOR CRUCIATE LIGAMENT OF RIGHT KNEE, INITIAL ENCOUNTER: ICD-10-CM

## 2023-09-10 RX ORDER — TRAMADOL HYDROCHLORIDE 50 MG/1
TABLET ORAL
Qty: 240 TABLET | Refills: 0 | Status: SHIPPED | OUTPATIENT
Start: 2023-09-10 | End: 2023-10-10

## 2023-09-20 ENCOUNTER — TELEMEDICINE (OUTPATIENT)
Dept: FAMILY MEDICINE CLINIC | Facility: CLINIC | Age: 53
End: 2023-09-20
Payer: COMMERCIAL

## 2023-09-20 DIAGNOSIS — M47.816 OSTEOARTHRITIS OF LUMBAR SPINE, UNSPECIFIED SPINAL OSTEOARTHRITIS COMPLICATION STATUS: ICD-10-CM

## 2023-09-20 DIAGNOSIS — M47.816 OSTEOARTHRITIS OF LUMBAR SPINE, UNSPECIFIED SPINAL OSTEOARTHRITIS COMPLICATION STATUS: Primary | ICD-10-CM

## 2023-09-20 DIAGNOSIS — L50.9 HIVES: ICD-10-CM

## 2023-09-20 PROCEDURE — 99213 OFFICE O/P EST LOW 20 MIN: CPT | Performed by: FAMILY MEDICINE

## 2023-09-20 RX ORDER — PREDNISONE 10 MG/1
TABLET ORAL
Qty: 11 TABLET | Refills: 0 | Status: SHIPPED | OUTPATIENT
Start: 2023-09-20

## 2023-09-20 RX ORDER — HYDROCODONE BITARTRATE AND ACETAMINOPHEN 10; 325 MG/1; MG/1
1-2 TABLET ORAL EVERY 8 HOURS PRN
Qty: 90 TABLET | Refills: 0 | Status: SHIPPED | OUTPATIENT
Start: 2023-09-20 | End: 2023-09-20

## 2023-09-20 RX ORDER — PREDNISONE 5 MG/1
5 TABLET ORAL EVERY MORNING
Qty: 30 TABLET | Refills: 0 | Status: SHIPPED | OUTPATIENT
Start: 2023-09-20

## 2023-09-20 RX ORDER — HYDROCODONE BITARTRATE AND ACETAMINOPHEN 10; 325 MG/1; MG/1
1-2 TABLET ORAL EVERY 8 HOURS PRN
Qty: 90 TABLET | Refills: 0 | Status: SHIPPED | OUTPATIENT
Start: 2023-09-20

## 2023-09-20 NOTE — PROGRESS NOTES
Subjective:   Patient ID: Curtis Garcia is a 47year old female. HPI  Ms. Massiel Anderson is a pleasant 50-year-old female with history of chronic pain syndrome secondary to lumbar degenerative joint disease, status post right knee arthroscopy for torn meniscus and history of hives presenting for video visit follow-up today for medication refills. She takes Norco and tramadol for pain management. She will just need Norco at this point to be sent to her pharmacy. She is scheduled to see her new allergist in [facility] on October 16 for management of recurrent hives. She is currently taking her prednisone with no need refills for this. No fever no cough no shortness of breath no nausea no vomiting no abdominal pain. I had reviewed past medical and family histories together with allergy and medication lists documented. History/Other:   Review of Systems  Constitutional: Negative for fatigue and fever. HENT: Negative for sore throat and trouble swallowing. Respiratory: Negative for cough. Gastrointestinal: Negative for abdominal pain, diarrhea, nausea and vomiting. Musculoskeletal: Positive for arthralgias, back pain and joint swelling. Current Outpatient Medications   Medication Sig Dispense Refill    HYDROCODONE-ACETAMINOPHEN  MG Oral Tab Take 1-2 tablets by mouth every 8 (eight) hours as needed for Pain. 90 tablet 0    predniSONE 10 MG Oral Tab TAKE 1 TABLET (10 MG TOTAL) BY MOUTH 2 (TWO) TIMES DAILY FOR 3 DAYS, THEN 1 TABLET (10 MG TOTAL) DAILY FOR 3 DAYS, THEN 1/2 TABLET (5 MG TOTAL) DAILY FOR 3 DAYS. 11 tablet 0    predniSONE 5 MG Oral Tab Take 1 tablet (5 mg total) by mouth every morning. 30 tablet 0    traMADol 50 MG Oral Tab Take 1-2 tablets ( mg total) by mouth every 4 to 6 hours as needed for Pain. 240 tablet 0    rosuvastatin 10 MG Oral Tab Take 1 tablet (10 mg total) by mouth nightly.  90 tablet 0    metoprolol succinate ER 50 MG Oral Tablet 24 Hr Take 1 tablet (50 mg total) by mouth daily. 90 tablet 1    metoprolol succinate ER 25 MG Oral Tablet 24 Hr Take 1 tablet (25 mg total) by mouth daily. 30 tablet 1    predniSONE 1 MG Oral Tab TAKE 4 TABLETS BY MOUTH DAILY FOR 7 DAYS, THEN 3 TABLETS DAILY FOR 7 DAYS, THEN 2 TABLETS DAILY FOR 14 DAYS. 90 tablet 0    mupirocin 2 % External Ointment Apply 1 Application topically 3 (three) times daily. 15 g 0    CYCLOBENZAPRINE 10 MG Oral Tab TAKE 1 TABLET BY MOUTH NIGHTLY AS NEEDED FOR MUSCLE SPASMS 30 tablet 5    Ibuprofen 100 MG Oral Chew Tab       LORAZEPAM 1 MG Oral Tab TAKE 1 TABLET (1 MG TOTAL) BY MOUTH 2 (TWO) TIMES DAILY AS NEEDED FOR ANXIETY. 30 tablet 0    predniSONE 50 MG Oral Tab Take 1 tablet (50 mg total) by mouth as needed. For allergy flare-up      Albuterol Sulfate  (90 Base) MCG/ACT Inhalation Aero Soln Inhale 2 puffs into the lungs every 6 (six) hours as needed for Wheezing. 3 each 1    aspirin  MG Oral Tab EC TAKE ONE TABLET BY MOUTH ONE TIME DAILY POST OPERATIVELY      Fexofenadine HCl 180 MG Oral Tab Take 1 tablet (180 mg total) by mouth daily. Naloxone HCl 4 MG/0.1ML Nasal Liquid 4 mg by Nasal route as needed. If patient remains unresponsive, repeat dose in other nostril 2-5 minutes after first dose. 1 kit 0    famoTIDine 20 MG Oral Tab Take 1 tablet (20 mg total) by mouth 2 (two) times daily. Ketotifen Fumarate (KETOTIFEN HYDROGEN FUMARATE) Does not apply Powder 2 mg. psyllium 28 % Oral Powd Pack Take 1 packet by mouth as needed. cetirizine 10 MG Oral Tab Take 1-2 tablets (10-20 mg total) by mouth daily. caffeine 200 MG Oral Tab Take 1 tablet (200 mg total) by mouth daily. EPINEPHrine 0.3 MG/0.3ML Injection Solution Auto-injector Inject 0.3 mL (1 each total) as directed one time.        Allergies:  Hydrochlorothiazide     SWELLING  Latex                   ANAPHYLAXIS  Latex                   ANAPHYLAXIS, RASH  Morphine                SWELLING    Comment:Legs swelling  Phentermine             HIVES, SWELLING  Lorazepam               OTHER (SEE COMMENTS)    Comment:Pt  said she has an adverse reaction to             Ativan last time she had it, no sure of reaction  Penicillin G            RASH    Objective:   Physical Exam  Constitutional:       General: She is not in acute distress. Neurological:      Mental Status: She is alert. Assessment & Plan:   Osteoarthritis of lumbar spine, unspecified spinal osteoarthritis complication status  (primary encounter diagnosis)  -Stable  - Current medication regimen has been effective  - We will refill [place] and sent to her pharmacy  Hives  -Continue with prednisone  - Prednisone 10 mg and 5 mg have been sent to her pharmacy but await recommendations from her allergist at [facility] as scheduled or as needed      This note was prepared using [employer] [facility] voice recognition dictation software. As a result errors may occur. When identified these errors have been corrected. While every attempt is made to correct errors during dictation discrepancies may still exist          No orders of the defined types were placed in this encounter. Meds This Visit:  Requested Prescriptions     Signed Prescriptions Disp Refills    HYDROCODONE-ACETAMINOPHEN  MG Oral Tab 90 tablet 0     Sig: Take 1-2 tablets by mouth every 8 (eight) hours as needed for Pain. predniSONE 10 MG Oral Tab 11 tablet 0     Sig: TAKE 1 TABLET (10 MG TOTAL) BY MOUTH 2 (TWO) TIMES DAILY FOR 3 DAYS, THEN 1 TABLET (10 MG TOTAL) DAILY FOR 3 DAYS, THEN 1/2 TABLET (5 MG TOTAL) DAILY FOR 3 DAYS. predniSONE 5 MG Oral Tab 30 tablet 0     Sig: Take 1 tablet (5 mg total) by mouth every morning.        Imaging & Referrals:  None

## 2023-09-20 NOTE — TELEPHONE ENCOUNTER
Pt requesting HYDROCODONE-ACETAMINOPHEN  MG Oral Tab   Be sent to [facility] [address] [facility] [place] [facility] [address] [phone], [address] [facility] [place] [address] Phone: [phone] Fax: [phone]     Previous pharmacy is out of stock. This [facility] [employer] just got [facility] new shipment.

## 2023-10-10 DIAGNOSIS — G89.29 CHRONIC PAIN OF RIGHT KNEE: ICD-10-CM

## 2023-10-10 DIAGNOSIS — M47.816 OSTEOARTHRITIS OF LUMBAR SPINE, UNSPECIFIED SPINAL OSTEOARTHRITIS COMPLICATION STATUS: ICD-10-CM

## 2023-10-10 DIAGNOSIS — G89.4 CHRONIC PAIN SYNDROME: ICD-10-CM

## 2023-10-10 DIAGNOSIS — S83.241A TEAR OF MEDIAL MENISCUS OF RIGHT KNEE, UNSPECIFIED TEAR TYPE, UNSPECIFIED WHETHER OLD OR CURRENT TEAR, INITIAL ENCOUNTER: ICD-10-CM

## 2023-10-10 DIAGNOSIS — M25.561 CHRONIC PAIN OF RIGHT KNEE: ICD-10-CM

## 2023-10-10 DIAGNOSIS — S83.511A RUPTURE OF ANTERIOR CRUCIATE LIGAMENT OF RIGHT KNEE, INITIAL ENCOUNTER: ICD-10-CM

## 2023-10-10 RX ORDER — TRAMADOL HYDROCHLORIDE 50 MG/1
TABLET ORAL
Qty: 240 TABLET | Refills: 0 | Status: SHIPPED | OUTPATIENT
Start: 2023-10-10

## 2023-10-10 RX ORDER — PREDNISONE 1 MG/1
TABLET ORAL
Qty: 90 TABLET | Refills: 0 | Status: SHIPPED | OUTPATIENT
Start: 2023-10-10

## 2023-10-17 ENCOUNTER — TELEPHONE (OUTPATIENT)
Dept: FAMILY MEDICINE CLINIC | Facility: CLINIC | Age: 53
End: 2023-10-17

## 2023-10-17 ENCOUNTER — TELEMEDICINE (OUTPATIENT)
Dept: FAMILY MEDICINE CLINIC | Facility: CLINIC | Age: 53
End: 2023-10-17
Payer: COMMERCIAL

## 2023-10-17 DIAGNOSIS — M19.90 ARTHRITIS: ICD-10-CM

## 2023-10-17 DIAGNOSIS — M25.561 CHRONIC PAIN OF RIGHT KNEE: Primary | ICD-10-CM

## 2023-10-17 DIAGNOSIS — M47.816 OSTEOARTHRITIS OF LUMBAR SPINE, UNSPECIFIED SPINAL OSTEOARTHRITIS COMPLICATION STATUS: ICD-10-CM

## 2023-10-17 DIAGNOSIS — G89.29 CHRONIC PAIN OF RIGHT KNEE: Primary | ICD-10-CM

## 2023-10-17 PROCEDURE — 99213 OFFICE O/P EST LOW 20 MIN: CPT | Performed by: FAMILY MEDICINE

## 2023-10-17 RX ORDER — OXYCODONE AND ACETAMINOPHEN 10; 325 MG/1; MG/1
1 TABLET ORAL EVERY 8 HOURS PRN
Qty: 90 TABLET | Refills: 0 | Status: SHIPPED | OUTPATIENT
Start: 2023-10-17

## 2023-10-17 NOTE — PROGRESS NOTES
Subjective:   Patient ID: Martita Nuno is a 47year old female. HPI  Ms. Tree Noguera is a very pleasant 52-year-old female presenting for video visit today. She has chronic pain secondary to lumbar DJD. She also has been having chronic right knee pain. This had worsened over the past several months. The knee is giving out. He had surgery for this a few years ago for meniscus tear. I did order MRI of the right knee for her but had . She also reports that her allergies seem to have been better. Of note she takes Norco and tramadol but Norco is in short supply nationwide. It was advised by her current pharmacy to switch to Percocet temporarily. I had reviewed past medical and family histories together with allergy and medication lists documented. History/Other:   Review of Systems   Constitutional:  Negative for fever. Musculoskeletal:  Positive for arthralgias. Current Outpatient Medications   Medication Sig Dispense Refill    oxyCODONE-acetaminophen  MG Oral Tab Take 1 tablet by mouth every 8 (eight) hours as needed for Pain. 90 tablet 0    traMADol 50 MG Oral Tab Take 1-2 tablets ( mg total) by mouth every 4 to 6 hours as needed for Pain. 240 tablet 0    predniSONE 1 MG Oral Tab TAKE 4 TABLETS BY MOUTH DAILY FOR 7 DAYS, THEN 3 TABLETS DAILY FOR 7 DAYS, THEN 2 TABLETS DAILY FOR 14 DAYS. 90 tablet 0    predniSONE 10 MG Oral Tab TAKE 1 TABLET (10 MG TOTAL) BY MOUTH 2 (TWO) TIMES DAILY FOR 3 DAYS, THEN 1 TABLET (10 MG TOTAL) DAILY FOR 3 DAYS, THEN 1/2 TABLET (5 MG TOTAL) DAILY FOR 3 DAYS. 11 tablet 0    predniSONE 5 MG Oral Tab Take 1 tablet (5 mg total) by mouth every morning. 30 tablet 0    HYDROCODONE-ACETAMINOPHEN  MG Oral Tab Take 1-2 tablets by mouth every 8 (eight) hours as needed for Pain. 90 tablet 0    rosuvastatin 10 MG Oral Tab Take 1 tablet (10 mg total) by mouth nightly.  90 tablet 0    metoprolol succinate ER 50 MG Oral Tablet 24 Hr Take 1 tablet (50 mg total) by mouth daily. 90 tablet 1    metoprolol succinate ER 25 MG Oral Tablet 24 Hr Take 1 tablet (25 mg total) by mouth daily. 30 tablet 1    mupirocin 2 % External Ointment Apply 1 Application topically 3 (three) times daily. 15 g 0    CYCLOBENZAPRINE 10 MG Oral Tab TAKE 1 TABLET BY MOUTH NIGHTLY AS NEEDED FOR MUSCLE SPASMS 30 tablet 5    Ibuprofen 100 MG Oral Chew Tab       LORAZEPAM 1 MG Oral Tab TAKE 1 TABLET (1 MG TOTAL) BY MOUTH 2 (TWO) TIMES DAILY AS NEEDED FOR ANXIETY. 30 tablet 0    predniSONE 50 MG Oral Tab Take 1 tablet (50 mg total) by mouth as needed. For allergy flare-up      Albuterol Sulfate  (90 Base) MCG/ACT Inhalation Aero Soln Inhale 2 puffs into the lungs every 6 (six) hours as needed for Wheezing. 3 each 1    aspirin  MG Oral Tab EC TAKE ONE TABLET BY MOUTH ONE TIME DAILY POST OPERATIVELY      Fexofenadine HCl 180 MG Oral Tab Take 1 tablet (180 mg total) by mouth daily. Naloxone HCl 4 MG/0.1ML Nasal Liquid 4 mg by Nasal route as needed. If patient remains unresponsive, repeat dose in other nostril 2-5 minutes after first dose. 1 kit 0    famoTIDine 20 MG Oral Tab Take 1 tablet (20 mg total) by mouth 2 (two) times daily. Ketotifen Fumarate (KETOTIFEN HYDROGEN FUMARATE) Does not apply Powder 2 mg. psyllium 28 % Oral Powd Pack Take 1 packet by mouth as needed. cetirizine 10 MG Oral Tab Take 1-2 tablets (10-20 mg total) by mouth daily. caffeine 200 MG Oral Tab Take 1 tablet (200 mg total) by mouth daily. EPINEPHrine 0.3 MG/0.3ML Injection Solution Auto-injector Inject 0.3 mL (1 each total) as directed one time.        Allergies:  Hydrochlorothiazide     SWELLING  Latex                   ANAPHYLAXIS  Latex                   ANAPHYLAXIS, RASH  Morphine                SWELLING    Comment:Legs swelling  Phentermine             HIVES, SWELLING  Lorazepam               OTHER (SEE COMMENTS)    Comment:Pt  said she has an adverse reaction to Ativan last time she had it, no sure of reaction  Penicillin G            RASH    Objective:   Physical Exam  Constitutional:       General: She is not in acute distress. Neurological:      Mental Status: She is alert. Assessment & Plan:   Chronic pain of right knee  (primary encounter diagnosis)  Arthritis  Osteoarthritis of lumbar spine, unspecified spinal osteoarthritis complication status  -With very order MRI of the right knee and will fax order to [address] [facility]  - Will send for Percocet  mg 1 tablet every 8 hours for now for the next 30 days.  - Keep appointment scheduled    This note was prepared using [employer] voice recognition dictation software. As a result errors may occur. When identified these errors have been corrected. While every attempt is made to correct errors during dictation discrepancies may still exist          No orders of the defined types were placed in this encounter. Meds This Visit:  Requested Prescriptions     Signed Prescriptions Disp Refills    oxyCODONE-acetaminophen  MG Oral Tab 90 tablet 0     Sig: Take 1 tablet by mouth every 8 (eight) hours as needed for Pain.        Imaging & Referrals:  MRI KNEE, RIGHT ([id number])

## 2023-10-18 ENCOUNTER — TELEPHONE (OUTPATIENT)
Dept: FAMILY MEDICINE CLINIC | Facility: CLINIC | Age: 53
End: 2023-10-18

## 2023-10-18 RX ORDER — CLOTRIMAZOLE 10 MG/1
10 LOZENGE ORAL; TOPICAL
Qty: 10 EACH | Refills: 0 | OUTPATIENT
Start: 2023-10-18

## 2023-10-18 NOTE — TELEPHONE ENCOUNTER
Dr.Tomacruz- Brandt with [facility] is calling to request a new rx for Clotrimazole 10 mg lozenge.       [facility] Katheryn Morrell [address] [phone], [phone]

## 2023-10-19 NOTE — TELEPHONE ENCOUNTER
Medication(s) to Refill:   Requested Prescriptions     Pending Prescriptions Disp Refills    mupirocin 2 % External Ointment 15 g 0     Sig: Apply 1 Application topically 3 (three) times daily.          Reason for Medication Refill being sent to Provider / Reason Protocol Failed:  [] 90 day refill has already been granted  [] Blood Pressure out of range  [] Labs Abnormal/over due  [] Medication not previously prescribed by Provider  [x] Non-Protocol Medication  [] Controlled Substance   [] Due for appointment- no future appointment scheduled  [] No Follow up specified      Last Time Medication was Filled:  5/2/23      Last Office Visit with PCP: 10/17/23    When Patient was Due Back to the Office:  -  (from when PCP last addressed condition)    Future Appointments:  Future Appointments   Date Time Provider Miguel Nguyen   11/1/2023  3:00 PM Kalpesh Kelley MD [facility] [address]         Last Blood Pressures:  BP Readings from Last 2 Encounters:  08/02/23 : 138/78  05/02/23 : (!) 168/100        Action taken:  [] Refill approved per protocol  [x] Routing to provider for approval

## 2023-10-24 ENCOUNTER — TELEMEDICINE (OUTPATIENT)
Dept: FAMILY MEDICINE CLINIC | Facility: CLINIC | Age: 53
End: 2023-10-24

## 2023-10-24 DIAGNOSIS — B37.0 ORAL THRUSH: Primary | ICD-10-CM

## 2023-10-24 PROCEDURE — 99213 OFFICE O/P EST LOW 20 MIN: CPT | Performed by: FAMILY MEDICINE

## 2023-10-24 RX ORDER — FLUCONAZOLE 150 MG/1
150 TABLET ORAL ONCE
Qty: 1 TABLET | Refills: 3 | Status: SHIPPED | OUTPATIENT
Start: 2023-10-24 | End: 2023-10-24

## 2023-10-24 NOTE — PROGRESS NOTES
Subjective:   Patient ID: Curtis Garcia is a 47year old female. HPI  Ms. Massiel Anderson is a pleasant 67-year-old female presenting for video visit today for thrush which she has had in the past.  She is currently on prednisone to help with her hives. Diflucan usually would help with this. I had reviewed past medical and family histories together with allergy and medication lists documented. History/Other:   Review of Systems   Constitutional:  Negative for fever. HENT:  Negative for trouble swallowing. Respiratory:  Negative for cough and shortness of breath. Current Outpatient Medications   Medication Sig Dispense Refill    fluconazole (DIFLUCAN) 150 MG Oral Tab Take 1 tablet (150 mg total) by mouth once for 1 dose. 1 tablet 3    mupirocin 2 % External Ointment Apply 1 Application topically 3 (three) times daily. 15 g 0    oxyCODONE-acetaminophen  MG Oral Tab Take 1 tablet by mouth every 8 (eight) hours as needed for Pain. 90 tablet 0    traMADol 50 MG Oral Tab Take 1-2 tablets ( mg total) by mouth every 4 to 6 hours as needed for Pain. 240 tablet 0    predniSONE 1 MG Oral Tab TAKE 4 TABLETS BY MOUTH DAILY FOR 7 DAYS, THEN 3 TABLETS DAILY FOR 7 DAYS, THEN 2 TABLETS DAILY FOR 14 DAYS. 90 tablet 0    predniSONE 10 MG Oral Tab TAKE 1 TABLET (10 MG TOTAL) BY MOUTH 2 (TWO) TIMES DAILY FOR 3 DAYS, THEN 1 TABLET (10 MG TOTAL) DAILY FOR 3 DAYS, THEN 1/2 TABLET (5 MG TOTAL) DAILY FOR 3 DAYS. 11 tablet 0    predniSONE 5 MG Oral Tab Take 1 tablet (5 mg total) by mouth every morning. 30 tablet 0    HYDROCODONE-ACETAMINOPHEN  MG Oral Tab Take 1-2 tablets by mouth every 8 (eight) hours as needed for Pain. 90 tablet 0    rosuvastatin 10 MG Oral Tab Take 1 tablet (10 mg total) by mouth nightly. 90 tablet 0    metoprolol succinate ER 50 MG Oral Tablet 24 Hr Take 1 tablet (50 mg total) by mouth daily.  90 tablet 1    metoprolol succinate ER 25 MG Oral Tablet 24 Hr Take 1 tablet (25 mg total) by mouth daily. 30 tablet 1    CYCLOBENZAPRINE 10 MG Oral Tab TAKE 1 TABLET BY MOUTH NIGHTLY AS NEEDED FOR MUSCLE SPASMS 30 tablet 5    Ibuprofen 100 MG Oral Chew Tab       LORAZEPAM 1 MG Oral Tab TAKE 1 TABLET (1 MG TOTAL) BY MOUTH 2 (TWO) TIMES DAILY AS NEEDED FOR ANXIETY. 30 tablet 0    predniSONE 50 MG Oral Tab Take 1 tablet (50 mg total) by mouth as needed. For allergy flare-up      Albuterol Sulfate  (90 Base) MCG/ACT Inhalation Aero Soln Inhale 2 puffs into the lungs every 6 (six) hours as needed for Wheezing. 3 each 1    aspirin  MG Oral Tab EC TAKE ONE TABLET BY MOUTH ONE TIME DAILY POST OPERATIVELY      Fexofenadine HCl 180 MG Oral Tab Take 1 tablet (180 mg total) by mouth daily. Naloxone HCl 4 MG/0.1ML Nasal Liquid 4 mg by Nasal route as needed. If patient remains unresponsive, repeat dose in other nostril 2-5 minutes after first dose. 1 kit 0    famoTIDine 20 MG Oral Tab Take 1 tablet (20 mg total) by mouth 2 (two) times daily. Ketotifen Fumarate (KETOTIFEN HYDROGEN FUMARATE) Does not apply Powder 2 mg. psyllium 28 % Oral Powd Pack Take 1 packet by mouth as needed. cetirizine 10 MG Oral Tab Take 1-2 tablets (10-20 mg total) by mouth daily. caffeine 200 MG Oral Tab Take 1 tablet (200 mg total) by mouth daily. EPINEPHrine 0.3 MG/0.3ML Injection Solution Auto-injector Inject 0.3 mL (1 each total) as directed one time. Allergies:  Hydrochlorothiazide     SWELLING  Latex                   ANAPHYLAXIS  Latex                   ANAPHYLAXIS, RASH  Morphine                SWELLING    Comment:Legs swelling  Phentermine             HIVES, SWELLING  Lorazepam               OTHER (SEE COMMENTS)    Comment:Pt  said she has an adverse reaction to             Ativan last time she had it, no sure of reaction  Penicillin G            RASH    Objective:   Physical Exam  Constitutional:       General: She is not in acute distress.   Neurological:      Mental Status: She is alert.         Assessment & Plan:   Oral thrush  (primary encounter diagnosis)  -We will go ahead and send for Diflucan 150 mg 1 tablet once. I have added refills for this. - Call or come in sooner if symptoms worsen or persist      This note was prepared using [employer] voice recognition dictation software. As a result errors may occur. When identified these errors have been corrected. While every attempt is made to correct errors during dictation discrepancies may still exist          No orders of the defined types were placed in this encounter. Meds This Visit:  Requested Prescriptions     Signed Prescriptions Disp Refills    fluconazole (DIFLUCAN) 150 MG Oral Tab 1 tablet 3     Sig: Take 1 tablet (150 mg total) by mouth once for 1 dose.        Imaging & Referrals:  None

## 2023-11-01 ENCOUNTER — OFFICE VISIT (OUTPATIENT)
Dept: FAMILY MEDICINE CLINIC | Facility: CLINIC | Age: 53
End: 2023-11-01
Payer: COMMERCIAL

## 2023-11-01 VITALS
SYSTOLIC BLOOD PRESSURE: 140 MMHG | HEIGHT: 69 IN | BODY MASS INDEX: 38.06 KG/M2 | WEIGHT: 257 LBS | DIASTOLIC BLOOD PRESSURE: 88 MMHG | OXYGEN SATURATION: 98 % | RESPIRATION RATE: 16 BRPM | HEART RATE: 92 BPM | TEMPERATURE: 98 F

## 2023-11-01 DIAGNOSIS — M25.561 CHRONIC PAIN OF RIGHT KNEE: ICD-10-CM

## 2023-11-01 DIAGNOSIS — G89.29 CHRONIC PAIN OF RIGHT KNEE: ICD-10-CM

## 2023-11-01 DIAGNOSIS — M47.816 OSTEOARTHRITIS OF LUMBAR SPINE, UNSPECIFIED SPINAL OSTEOARTHRITIS COMPLICATION STATUS: Primary | ICD-10-CM

## 2023-11-01 PROCEDURE — 3008F BODY MASS INDEX DOCD: CPT | Performed by: FAMILY MEDICINE

## 2023-11-01 PROCEDURE — 3079F DIAST BP 80-89 MM HG: CPT | Performed by: FAMILY MEDICINE

## 2023-11-01 PROCEDURE — 99214 OFFICE O/P EST MOD 30 MIN: CPT | Performed by: FAMILY MEDICINE

## 2023-11-01 PROCEDURE — 3077F SYST BP >= 140 MM HG: CPT | Performed by: FAMILY MEDICINE

## 2023-11-01 RX ORDER — PREDNISONE 5 MG/1
5 TABLET ORAL EVERY MORNING
Qty: 30 TABLET | Refills: 0 | Status: SHIPPED | OUTPATIENT
Start: 2023-11-01

## 2023-11-01 NOTE — PROGRESS NOTES
Subjective:   Patient ID: Flora Mcadams is a 47year old female. HPI  Ms. Pratibha Bazan is a pleasant 46-year-old female with history of chronic pain syndrome secondary to lumbar degenerative joint disease, status post right knee arthroscopy for torn meniscus and history of hives here for follow-up appointment for chronic pain management. Previously Bhavik Dominique was in short supply and had to switch to Percocet which has been helpful with her pain. She has had this in the past and did not have any side effects. She would like to continue this for now. We did schedule an MRI of the right knee for her and will do this in the next few days as her  recently had a back injury and needed to take care of her . I had reviewed past medical and family histories together with allergy and medication lists documented. History/Other:   Review of Systems  Constitutional: Negative for fatigue and fever. HENT: Negative for sore throat and trouble swallowing. Respiratory: Negative for cough. Gastrointestinal: Negative for abdominal pain, diarrhea, nausea and vomiting. Musculoskeletal: Positive for arthralgias, back pain and joint swelling. Current Outpatient Medications   Medication Sig Dispense Refill    predniSONE 5 MG Oral Tab Take 1 tablet (5 mg total) by mouth every morning. 30 tablet 0    mupirocin 2 % External Ointment Apply 1 Application topically 3 (three) times daily. 15 g 0    oxyCODONE-acetaminophen  MG Oral Tab Take 1 tablet by mouth every 8 (eight) hours as needed for Pain. 90 tablet 0    traMADol 50 MG Oral Tab Take 1-2 tablets ( mg total) by mouth every 4 to 6 hours as needed for Pain. 240 tablet 0    HYDROCODONE-ACETAMINOPHEN  MG Oral Tab Take 1-2 tablets by mouth every 8 (eight) hours as needed for Pain. 90 tablet 0    rosuvastatin 10 MG Oral Tab Take 1 tablet (10 mg total) by mouth nightly.  90 tablet 0    metoprolol succinate ER 50 MG Oral Tablet 24 Hr Take 1 tablet (50 mg total) by mouth daily. 90 tablet 1    metoprolol succinate ER 25 MG Oral Tablet 24 Hr Take 1 tablet (25 mg total) by mouth daily. 30 tablet 1    CYCLOBENZAPRINE 10 MG Oral Tab TAKE 1 TABLET BY MOUTH NIGHTLY AS NEEDED FOR MUSCLE SPASMS 30 tablet 5    Ibuprofen 100 MG Oral Chew Tab       LORAZEPAM 1 MG Oral Tab TAKE 1 TABLET (1 MG TOTAL) BY MOUTH 2 (TWO) TIMES DAILY AS NEEDED FOR ANXIETY. 30 tablet 0    Albuterol Sulfate  (90 Base) MCG/ACT Inhalation Aero Soln Inhale 2 puffs into the lungs every 6 (six) hours as needed for Wheezing. 3 each 1    aspirin  MG Oral Tab EC TAKE ONE TABLET BY MOUTH ONE TIME DAILY POST OPERATIVELY      Fexofenadine HCl 180 MG Oral Tab Take 1 tablet (180 mg total) by mouth daily. Naloxone HCl 4 MG/0.1ML Nasal Liquid 4 mg by Nasal route as needed. If patient remains unresponsive, repeat dose in other nostril 2-5 minutes after first dose. 1 kit 0    famoTIDine 20 MG Oral Tab Take 1 tablet (20 mg total) by mouth 2 (two) times daily. Ketotifen Fumarate (KETOTIFEN HYDROGEN FUMARATE) Does not apply Powder 2 mg. psyllium 28 % Oral Powd Pack Take 1 packet by mouth as needed. cetirizine 10 MG Oral Tab Take 1-2 tablets (10-20 mg total) by mouth daily. caffeine 200 MG Oral Tab Take 1 tablet (200 mg total) by mouth daily. EPINEPHrine 0.3 MG/0.3ML Injection Solution Auto-injector Inject 0.3 mL (1 each total) as directed one time. predniSONE 1 MG Oral Tab TAKE 4 TABLETS BY MOUTH DAILY FOR 7 DAYS, THEN 3 TABLETS DAILY FOR 7 DAYS, THEN 2 TABLETS DAILY FOR 14 DAYS. (Patient not taking: Reported on 11/1/2023) 90 tablet 0    predniSONE 10 MG Oral Tab TAKE 1 TABLET (10 MG TOTAL) BY MOUTH 2 (TWO) TIMES DAILY FOR 3 DAYS, THEN 1 TABLET (10 MG TOTAL) DAILY FOR 3 DAYS, THEN 1/2 TABLET (5 MG TOTAL) DAILY FOR 3 DAYS. (Patient not taking: Reported on 11/1/2023) 11 tablet 0    predniSONE 50 MG Oral Tab Take 1 tablet (50 mg total) by mouth as needed.  For allergy flare-up (Patient not taking: Reported on 11/1/2023)       Allergies:  Hydrochlorothiazide     SWELLING  Latex                   ANAPHYLAXIS  Latex                   ANAPHYLAXIS, RASH  Morphine                SWELLING    Comment:Legs swelling  Phentermine             HIVES, SWELLING  Lorazepam               OTHER (SEE COMMENTS)    Comment:Pt  said she has an adverse reaction to             Ativan last time she had it, no sure of reaction  Penicillin G            RASH    Objective:   Physical Exam  Vitals reviewed. Constitutional:       General: She is not in acute distress. HENT:      Mouth/Throat:      Mouth: Mucous membranes are moist.      Pharynx: Oropharynx is clear. Eyes:      General: No scleral icterus. Conjunctiva/sclera: Conjunctivae normal.   Cardiovascular:      Rate and Rhythm: Normal rate and regular rhythm. Heart sounds: Normal heart sounds. No murmur heard. Pulmonary:      Effort: Pulmonary effort is normal. No respiratory distress. Breath sounds: Normal breath sounds. No wheezing or rales. Abdominal:      General: Bowel sounds are normal.      Palpations: Abdomen is soft. Musculoskeletal:      Cervical back: Neck supple. Right knee: No swelling, bony tenderness or crepitus. Normal range of motion. No tenderness. Normal meniscus and normal patellar mobility. Right lower leg: No edema. Lymphadenopathy:      Cervical: No cervical adenopathy. Skin:     General: Skin is warm. Neurological:      Mental Status: She is alert. Assessment & Plan:   Osteoarthritis of lumbar spine, unspecified spinal osteoarthritis complication status  (primary encounter diagnosis)  -Continue with current pain regimen which include tramadol and Percocet at this point. Chronic pain of right knee  -Await MRI results  - Likely she will need to see orthopedist  - Continue current pain regimen which will be refilled when they do the corresponding pharmacies.     Prednisone refilled today for ongoing hives which has been helpful. This note was prepared using [employer] voice recognition dictation software. As a result errors may occur. When identified these errors have been corrected. While every attempt is made to correct errors during dictation discrepancies may still exist          No orders of the defined types were placed in this encounter. Meds This Visit:  Requested Prescriptions     Signed Prescriptions Disp Refills    predniSONE 5 MG Oral Tab 30 tablet 0     Sig: Take 1 tablet (5 mg total) by mouth every morning.        Imaging & Referrals:  None

## 2023-11-01 NOTE — PATIENT INSTRUCTIONS
Thank you for choosing Jasmyne Lara MD at [place] [address]  To Do: Ary Viri  1. Please take meds as directed. [facility] is located in [address]. Monday, Tuesday & Friday - 8 a.m. to 4 p.m. Wednesday, Thursday - 7 a.m. to 3 p.m. The lab is closed daily from 12 p.m.-12:30 p.m. Saturday lab hours by appointment. Call [phone] to schedule the appointment. Please signup for [employer], which is electronic access to your record if you have not done so. All your results will post on there. [email or web address]   You can NOW use Bon[employer] to book your appointments with us, or consider using open access scheduling which is through the edward website [email or web address]" and type in Jasmyne Lara MD and follow the links for \"Schedule Online Now\"    To schedule Imaging or tests at [facility] Scheduling [phone], Go to [place] A ER Building (For example: CT scans, X rays, Ultrasound, MRI)  Cardiac Testing in ER building Building A second floor Cardiac Testing [phone] (For example: Holter Monitor, Cardiac Stress tests,Event Monitor, or 2D Echocardiograms)  [facility] call [phone] usually in Bldg A  Walk in Clinic in [facility] at [facility]. [address] Mon-Fri at 8am-7:30 p.m., and Sat/Sun 9:00a. m.-4:30 p.m. Also at [address]  Call [phone] for info     Please call our office about any questions regarding your treatment/medicines/tests as a result of today's visit. For your safety, read the entire package insert of all medicines prescribed to you and be aware of all of the risks of treatment even beyond those discussed today. All therapies have potential risk of harm or side effects or medication interactions.   It is your duty and for your safety to discuss with the pharmacist and our office with questions, and to notify us and stop treatment if problems arise, but know that our intention is that the benefits outweigh those potential risks and we strive to make you healthier and to improve your quality of life. Referrals, and Radiology Information:    If your insurance requires a referral to a specialist, please allow 5 business days to process your referral request.    If Jennifer Cruz MD orders a CT or MRI, it may take up to 10 business days to receive approval from your insurance company. Once our office has called informing you that the insurance company approved your testing, please call Central Scheduling at [phone]  Please allow our office 5 business days to contact you regarding any testing results. Refill policies:   Allow 3 business days for refills; controlled substances may take longer and must be picked up from the office in person. Narcotic medications can only be filled in 30 day increments and must be refilled at an office visit only. If your prescription is due for a refill, you may be due for a follow-up appointment. We cannot refill your maintenance medications at a preventative wellness visit. To best provide you care, patients receiving maintenance medications need to be seen at least twice a year.

## 2023-11-07 DIAGNOSIS — G89.4 CHRONIC PAIN SYNDROME: ICD-10-CM

## 2023-11-07 DIAGNOSIS — M47.816 OSTEOARTHRITIS OF LUMBAR SPINE, UNSPECIFIED SPINAL OSTEOARTHRITIS COMPLICATION STATUS: ICD-10-CM

## 2023-11-07 DIAGNOSIS — S83.511A RUPTURE OF ANTERIOR CRUCIATE LIGAMENT OF RIGHT KNEE, INITIAL ENCOUNTER: ICD-10-CM

## 2023-11-07 DIAGNOSIS — G89.29 CHRONIC PAIN OF RIGHT KNEE: ICD-10-CM

## 2023-11-07 DIAGNOSIS — M25.561 CHRONIC PAIN OF RIGHT KNEE: ICD-10-CM

## 2023-11-07 DIAGNOSIS — S83.241A TEAR OF MEDIAL MENISCUS OF RIGHT KNEE, UNSPECIFIED TEAR TYPE, UNSPECIFIED WHETHER OLD OR CURRENT TEAR, INITIAL ENCOUNTER: ICD-10-CM

## 2023-11-07 RX ORDER — TRAMADOL HYDROCHLORIDE 50 MG/1
TABLET ORAL
Qty: 240 TABLET | Refills: 0 | Status: SHIPPED | OUTPATIENT
Start: 2023-11-07

## 2023-11-13 DIAGNOSIS — M19.90 ARTHRITIS: ICD-10-CM

## 2023-11-13 DIAGNOSIS — G89.29 CHRONIC PAIN OF RIGHT KNEE: ICD-10-CM

## 2023-11-13 DIAGNOSIS — M47.816 OSTEOARTHRITIS OF LUMBAR SPINE, UNSPECIFIED SPINAL OSTEOARTHRITIS COMPLICATION STATUS: ICD-10-CM

## 2023-11-13 DIAGNOSIS — M25.561 CHRONIC PAIN OF RIGHT KNEE: ICD-10-CM

## 2023-11-13 RX ORDER — OXYCODONE AND ACETAMINOPHEN 10; 325 MG/1; MG/1
1 TABLET ORAL EVERY 8 HOURS PRN
Qty: 90 TABLET | Refills: 0 | Status: SHIPPED | OUTPATIENT
Start: 2023-11-13

## 2023-11-14 ENCOUNTER — TELEPHONE (OUTPATIENT)
Dept: FAMILY MEDICINE CLINIC | Facility: CLINIC | Age: 53
End: 2023-11-14

## 2023-11-14 DIAGNOSIS — M47.816 OSTEOARTHRITIS OF LUMBAR SPINE, UNSPECIFIED SPINAL OSTEOARTHRITIS COMPLICATION STATUS: ICD-10-CM

## 2023-11-14 DIAGNOSIS — G89.29 CHRONIC PAIN OF RIGHT KNEE: ICD-10-CM

## 2023-11-14 DIAGNOSIS — M25.561 CHRONIC PAIN OF RIGHT KNEE: ICD-10-CM

## 2023-11-14 DIAGNOSIS — M19.90 ARTHRITIS: ICD-10-CM

## 2023-11-14 RX ORDER — OXYCODONE AND ACETAMINOPHEN 10; 325 MG/1; MG/1
1 TABLET ORAL EVERY 8 HOURS PRN
Qty: 90 TABLET | Refills: 0 | Status: SHIPPED | OUTPATIENT
Start: 2023-11-14

## 2023-11-14 NOTE — TELEPHONE ENCOUNTER
- Pt is calling to have medication sent to another pharmacy as pharmacy is out of the medication. Please re send to [facility]. oxyCODONE-acetaminophen  MG Oral Tab 90 tablet 0 11/13/2023     Sig - Route:  Take 1 tablet by mouth every 8 (eight) hours as needed for Pain. - Oral    Sent to pharmacy as: oxyCODONE-Acetaminophen  MG Oral Tablet (Percocet)    Earliest Fill Date: 11/13/2023    E-Prescribing Status: Receipt confirmed by pharmacy (11/13/2023 12:07 PM CST)    Prior authorization: Closed - Other      [address], [address] [facility] AT [facility] [address] [place] [address], [phone], [phone]

## 2023-12-04 DIAGNOSIS — G89.4 CHRONIC PAIN SYNDROME: ICD-10-CM

## 2023-12-04 DIAGNOSIS — S83.241A TEAR OF MEDIAL MENISCUS OF RIGHT KNEE, UNSPECIFIED TEAR TYPE, UNSPECIFIED WHETHER OLD OR CURRENT TEAR, INITIAL ENCOUNTER: ICD-10-CM

## 2023-12-04 DIAGNOSIS — S83.511A RUPTURE OF ANTERIOR CRUCIATE LIGAMENT OF RIGHT KNEE, INITIAL ENCOUNTER: ICD-10-CM

## 2023-12-04 DIAGNOSIS — M47.816 OSTEOARTHRITIS OF LUMBAR SPINE, UNSPECIFIED SPINAL OSTEOARTHRITIS COMPLICATION STATUS: ICD-10-CM

## 2023-12-04 DIAGNOSIS — G89.29 CHRONIC PAIN OF RIGHT KNEE: ICD-10-CM

## 2023-12-04 DIAGNOSIS — M25.561 CHRONIC PAIN OF RIGHT KNEE: ICD-10-CM

## 2023-12-04 RX ORDER — TRAMADOL HYDROCHLORIDE 50 MG/1
TABLET ORAL
Qty: 240 TABLET | Refills: 0 | Status: SHIPPED | OUTPATIENT
Start: 2023-12-04

## 2023-12-06 ENCOUNTER — TELEPHONE (OUTPATIENT)
Dept: FAMILY MEDICINE CLINIC | Facility: CLINIC | Age: 53
End: 2023-12-06

## 2023-12-06 NOTE — TELEPHONE ENCOUNTER
Received request from  [facility]/CoverMyMeds that a Prior Authorization is needed for  Tramadol HCI  Key# [id number]  Paperwork placed in MA folder.

## 2023-12-11 DIAGNOSIS — M25.561 CHRONIC PAIN OF RIGHT KNEE: ICD-10-CM

## 2023-12-11 DIAGNOSIS — M47.816 OSTEOARTHRITIS OF LUMBAR SPINE, UNSPECIFIED SPINAL OSTEOARTHRITIS COMPLICATION STATUS: ICD-10-CM

## 2023-12-11 DIAGNOSIS — G89.29 CHRONIC PAIN OF RIGHT KNEE: ICD-10-CM

## 2023-12-11 DIAGNOSIS — M19.90 ARTHRITIS: ICD-10-CM

## 2023-12-11 RX ORDER — OXYCODONE AND ACETAMINOPHEN 10; 325 MG/1; MG/1
1 TABLET ORAL EVERY 8 HOURS PRN
Qty: 90 TABLET | Refills: 0 | Status: SHIPPED | OUTPATIENT
Start: 2023-12-11

## 2023-12-11 RX ORDER — OXYCODONE AND ACETAMINOPHEN 10; 325 MG/1; MG/1
1 TABLET ORAL EVERY 8 HOURS PRN
Qty: 90 TABLET | Refills: 0 | Status: SHIPPED | OUTPATIENT
Start: 2023-12-11 | End: 2023-12-11

## 2023-12-11 NOTE — TELEPHONE ENCOUNTER
Pt will need the [facility] [address] in [place], with phone [phone]. This is the only pharmacy near her that has the medication. Would like the refill today.

## 2023-12-11 NOTE — TELEPHONE ENCOUNTER
Requesting Oxycodone 10/325mg  LOV: 11/1/23  RTC: 3 months  Last Relevant Labs: 7/25/23  Filled: 11/14/23 #90 with 0 refills    Future Appointments   Date Time Provider Miguel Nguyen   1/31/2024 10:30 AM Alexus Walekr MD [facility] [address] [facility] [address]     Per [facility], last dispensed 11/14/23 #90 (30 day supply)    Rx pended and routed for approval/denial

## 2023-12-11 NOTE — TELEPHONE ENCOUNTER
- Pt is requesting refill. Pt will scheduled 3 month follow up. Disp Refills Start End    oxyCODONE-acetaminophen  MG Oral Tab 90 tablet 0 11/14/2023     Sig - Route:  Take 1 tablet by mouth every 8 (eight) hours as needed for Pain. - Oral    Sent to pharmacy as: oxyCODONE-Acetaminophen  MG Oral Tablet (Percocet)    Earliest Fill Date: 11/14/2023    E-Prescribing Status: Receipt confirmed by pharmacy (11/14/2023 11:13 AM CST)        [id number] - [place], [address] AT [address] [facility], [phone], [phone]

## 2023-12-26 DIAGNOSIS — S83.241A TEAR OF MEDIAL MENISCUS OF RIGHT KNEE, UNSPECIFIED TEAR TYPE, UNSPECIFIED WHETHER OLD OR CURRENT TEAR, INITIAL ENCOUNTER: ICD-10-CM

## 2023-12-26 DIAGNOSIS — S83.511A RUPTURE OF ANTERIOR CRUCIATE LIGAMENT OF RIGHT KNEE, INITIAL ENCOUNTER: ICD-10-CM

## 2023-12-26 DIAGNOSIS — G89.4 CHRONIC PAIN SYNDROME: ICD-10-CM

## 2023-12-26 DIAGNOSIS — G89.29 CHRONIC PAIN OF RIGHT KNEE: ICD-10-CM

## 2023-12-26 DIAGNOSIS — M47.816 OSTEOARTHRITIS OF LUMBAR SPINE, UNSPECIFIED SPINAL OSTEOARTHRITIS COMPLICATION STATUS: ICD-10-CM

## 2023-12-26 DIAGNOSIS — M25.561 CHRONIC PAIN OF RIGHT KNEE: ICD-10-CM

## 2023-12-26 RX ORDER — TRAMADOL HYDROCHLORIDE 50 MG/1
TABLET ORAL
Qty: 240 TABLET | Refills: 0 | Status: SHIPPED | OUTPATIENT
Start: 2023-12-26

## 2023-12-26 RX ORDER — PREDNISONE 1 MG/1
TABLET ORAL
Qty: 90 TABLET | Refills: 0 | Status: SHIPPED | OUTPATIENT
Start: 2023-12-26

## 2024-01-02 ENCOUNTER — TELEPHONE (OUTPATIENT)
Dept: FAMILY MEDICINE CLINIC | Facility: CLINIC | Age: 54
End: 2024-01-02

## 2024-01-08 DIAGNOSIS — M47.816 OSTEOARTHRITIS OF LUMBAR SPINE, UNSPECIFIED SPINAL OSTEOARTHRITIS COMPLICATION STATUS: ICD-10-CM

## 2024-01-08 DIAGNOSIS — G89.29 CHRONIC PAIN OF RIGHT KNEE: ICD-10-CM

## 2024-01-08 DIAGNOSIS — M25.561 CHRONIC PAIN OF RIGHT KNEE: ICD-10-CM

## 2024-01-08 DIAGNOSIS — M19.90 ARTHRITIS: ICD-10-CM

## 2024-01-08 RX ORDER — OXYCODONE AND ACETAMINOPHEN 10; 325 MG/1; MG/1
1 TABLET ORAL EVERY 8 HOURS PRN
Qty: 90 TABLET | Refills: 0 | Status: SHIPPED | OUTPATIENT
Start: 2024-01-08

## 2024-01-08 NOTE — TELEPHONE ENCOUNTER
Requesting oxycodone 10/325mg  Last OV: 11/1/23  RTC: 3 months  Last Rx'd 12/11/23 #90 with 0 refills    Future Appointments   Date Time Provider Department Center   1/31/2024 10:30 AM Cole Garvin MD [facility] [address] [facility] [address]       Non-protocol med:  Rx pended and routed for approval/denial

## 2024-01-08 NOTE — TELEPHONE ENCOUNTER
Pt called and said she found a pharmacy that has her Percocet.  [facility] [address] [place] [address], [phone].  She is completely out of her meds.

## 2024-01-22 ENCOUNTER — TELEPHONE (OUTPATIENT)
Dept: FAMILY MEDICINE CLINIC | Facility: CLINIC | Age: 54
End: 2024-01-22

## 2024-01-22 RX ORDER — PREDNISONE 5 MG/1
5 TABLET ORAL EVERY MORNING
Qty: 30 TABLET | Refills: 0 | Status: SHIPPED | OUTPATIENT
Start: 2024-01-22

## 2024-01-22 NOTE — TELEPHONE ENCOUNTER
Is patient supposed to be on both doses of Prednisone?   Z Plasty Text: The lesion was extirpated to the level of the fat with a #15 scalpel blade.  Given the location of the defect, shape of the defect and the proximity to free margins a Z-plasty was deemed most appropriate for repair.  Using a sterile surgical marker, the appropriate transposition arms of the Z-plasty were drawn incorporating the defect and placing the expected incisions within the relaxed skin tension lines where possible.    The area thus outlined was incised deep to adipose tissue with a #15 scalpel blade.  The skin margins were undermined to an appropriate distance in all directions utilizing iris scissors.  The opposing transposition arms were then transposed into place in opposite direction and anchored with interrupted buried subcutaneous sutures.

## 2024-01-22 NOTE — TELEPHONE ENCOUNTER
Pt called and said she needs a refill on:    predniSONE 1 MG Oral Tab     predniSONE 5 MG Oral Tab     Sent to [facility] - [place], [place] - [address] [phone], [phone]

## 2024-01-23 ENCOUNTER — TELEPHONE (OUTPATIENT)
Dept: FAMILY MEDICINE CLINIC | Facility: CLINIC | Age: 54
End: 2024-01-23

## 2024-01-23 NOTE — TELEPHONE ENCOUNTER
Recd ppw from pt from [facility] to be completed by physician-pt needs this completed for leave of absence for her spouse. Fax to [phone] when complete. Placed in physician folder.  Future Appointments   Date Time Provider Department Center   1/31/2024 10:30 AM Cole Garvin MD [facility] [address] [facility] [address]

## 2024-01-30 DIAGNOSIS — M47.816 OSTEOARTHRITIS OF LUMBAR SPINE, UNSPECIFIED SPINAL OSTEOARTHRITIS COMPLICATION STATUS: ICD-10-CM

## 2024-01-30 DIAGNOSIS — G89.29 CHRONIC PAIN OF RIGHT KNEE: ICD-10-CM

## 2024-01-30 DIAGNOSIS — S83.511A RUPTURE OF ANTERIOR CRUCIATE LIGAMENT OF RIGHT KNEE, INITIAL ENCOUNTER: ICD-10-CM

## 2024-01-30 DIAGNOSIS — G89.4 CHRONIC PAIN SYNDROME: ICD-10-CM

## 2024-01-30 DIAGNOSIS — M25.561 CHRONIC PAIN OF RIGHT KNEE: ICD-10-CM

## 2024-01-30 DIAGNOSIS — S83.241A TEAR OF MEDIAL MENISCUS OF RIGHT KNEE, UNSPECIFIED TEAR TYPE, UNSPECIFIED WHETHER OLD OR CURRENT TEAR, INITIAL ENCOUNTER: ICD-10-CM

## 2024-01-30 RX ORDER — TRAMADOL HYDROCHLORIDE 50 MG/1
TABLET ORAL
Qty: 240 TABLET | Refills: 0 | Status: SHIPPED | OUTPATIENT
Start: 2024-01-30

## 2024-01-31 ENCOUNTER — OFFICE VISIT (OUTPATIENT)
Dept: FAMILY MEDICINE CLINIC | Facility: CLINIC | Age: 54
End: 2024-01-31
Payer: COMMERCIAL

## 2024-01-31 ENCOUNTER — TELEPHONE (OUTPATIENT)
Dept: FAMILY MEDICINE CLINIC | Facility: CLINIC | Age: 54
End: 2024-01-31

## 2024-01-31 VITALS
OXYGEN SATURATION: 98 % | HEART RATE: 96 BPM | RESPIRATION RATE: 16 BRPM | HEIGHT: 69 IN | BODY MASS INDEX: 38.36 KG/M2 | SYSTOLIC BLOOD PRESSURE: 166 MMHG | TEMPERATURE: 98 F | DIASTOLIC BLOOD PRESSURE: 94 MMHG | WEIGHT: 259 LBS

## 2024-01-31 DIAGNOSIS — F41.9 ANXIETY: ICD-10-CM

## 2024-01-31 DIAGNOSIS — M47.816 OSTEOARTHRITIS OF LUMBAR SPINE, UNSPECIFIED SPINAL OSTEOARTHRITIS COMPLICATION STATUS: Primary | ICD-10-CM

## 2024-01-31 DIAGNOSIS — L50.9 HIVES: ICD-10-CM

## 2024-01-31 DIAGNOSIS — G89.4 CHRONIC PAIN SYNDROME: ICD-10-CM

## 2024-01-31 PROCEDURE — 3008F BODY MASS INDEX DOCD: CPT | Performed by: FAMILY MEDICINE

## 2024-01-31 PROCEDURE — 3077F SYST BP >= 140 MM HG: CPT | Performed by: FAMILY MEDICINE

## 2024-01-31 PROCEDURE — 3080F DIAST BP >= 90 MM HG: CPT | Performed by: FAMILY MEDICINE

## 2024-01-31 PROCEDURE — 99214 OFFICE O/P EST MOD 30 MIN: CPT | Performed by: FAMILY MEDICINE

## 2024-01-31 RX ORDER — EPINEPHRINE 0.3 MG/.3ML
0.3 INJECTION SUBCUTANEOUS ONCE
Qty: 1 EACH | Refills: 0 | Status: SHIPPED | OUTPATIENT
Start: 2024-01-31 | End: 2024-01-31

## 2024-01-31 RX ORDER — OXYCODONE AND ACETAMINOPHEN 10; 325 MG/1; MG/1
1-2 TABLET ORAL EVERY 8 HOURS PRN
Qty: 90 TABLET | Refills: 0 | Status: SHIPPED | OUTPATIENT
Start: 2024-02-27

## 2024-01-31 RX ORDER — LORAZEPAM 1 MG/1
1 TABLET ORAL 2 TIMES DAILY PRN
Qty: 30 TABLET | Refills: 0 | Status: SHIPPED | OUTPATIENT
Start: 2024-01-31

## 2024-01-31 RX ORDER — OXYCODONE AND ACETAMINOPHEN 10; 325 MG/1; MG/1
1-2 TABLET ORAL EVERY 8 HOURS PRN
Qty: 90 TABLET | Refills: 0 | Status: SHIPPED | OUTPATIENT
Start: 2024-01-31

## 2024-01-31 RX ORDER — OXYCODONE AND ACETAMINOPHEN 10; 325 MG/1; MG/1
1-2 TABLET ORAL EVERY 8 HOURS PRN
Qty: 90 TABLET | Refills: 0 | Status: SHIPPED | OUTPATIENT
Start: 2024-03-26

## 2024-01-31 NOTE — TELEPHONE ENCOUNTER
Available Formulary Alternatives: epinephrine (except NDCs [id number]-BW and [id number]), AUVI-Q     Please advise on medication change    Thanks!

## 2024-01-31 NOTE — PATIENT INSTRUCTIONS
Thank you for choosing Cole Garvin MD at [facility]  To Do: Leonie Wood  1. Please take meds as directed.    [facility] is located in [address].  Monday, Tuesday & Friday - 8 a.m. to 4 p.m.  Wednesday, Thursday - 7 a.m. to 3 p.m.  The lab is closed daily from 12 p.m.-12:30 p.m.  Saturday lab hours by appointment. Call [phone] to schedule the appointment.   Please signup for [employer], which is electronic access to your record if you have not done so.  All your results will post on there.  [email or web address]   You can NOW use [employer] to book your appointments with us, or consider using open access scheduling which is through the [facility] website [email or web address] and type in Cole Garvin MD and follow the links for \"Schedule Online Now\"    To schedule Imaging or tests at [facility] call Central Scheduling [phone], Go to [facility] A ER Building (For example: CT scans, X rays, Ultrasound, MRI)  Cardiac Testing in ER building Building A second floor Cardiac Testing [phone] (For example: Holter Monitor, Cardiac Stress tests,Event Monitor, or 2D Echocardiograms)  [facility] call [phone] usually in [facility] A  Walk in Clinic in [place] at [address] Mon-Fri at 8am-7:30 p.m., and Sat/Sun 9:00a.m.-4:30 p.m.  Also at [address] [place] [address]  Call [phone] for info     Please call our office about any questions regarding your treatment/medicines/tests as a result of today's visit.  For your safety, read the entire package insert of all medicines prescribed to you and be aware of all of the risks of treatment even beyond those discussed today.  All therapies have potential risk of harm or side effects or medication interactions.  It is your duty and for your safety to discuss with the pharmacist and our office with questions, and to notify us and stop treatment if problems arise, but know that our intention is that the benefits outweigh  those potential risks and we strive to make you healthier and to improve your quality of life.    Referrals, and Radiology Information:    If your insurance requires a referral to a specialist, please allow 5 business days to process your referral request.    If Cole Garvin MD orders a CT or MRI, it may take up to 10 business days to receive approval from your insurance company. Once our office has called informing you that the insurance company approved your testing, please call Central Scheduling at [phone]  Please allow our office 5 business days to contact you regarding any testing results.    Refill policies:   Allow 3 business days for refills; controlled substances may take longer and must be picked up from the office in person.  Narcotic medications can only be filled in 30 day increments and must be refilled at an office visit only.  If your prescription is due for a refill, you may be due for a follow-up appointment.  We cannot refill your maintenance medications at a preventative wellness visit.  To best provide you care, patients receiving maintenance medications need to be seen at least twice a year.

## 2024-02-07 ENCOUNTER — TELEMEDICINE (OUTPATIENT)
Dept: FAMILY MEDICINE CLINIC | Facility: CLINIC | Age: 54
End: 2024-02-07
Payer: COMMERCIAL

## 2024-02-07 ENCOUNTER — TELEPHONE (OUTPATIENT)
Dept: FAMILY MEDICINE CLINIC | Facility: CLINIC | Age: 54
End: 2024-02-07

## 2024-02-07 DIAGNOSIS — K08.89 PAIN, DENTAL: Primary | ICD-10-CM

## 2024-02-07 PROCEDURE — 99213 OFFICE O/P EST LOW 20 MIN: CPT | Performed by: FAMILY MEDICINE

## 2024-02-07 RX ORDER — CLINDAMYCIN HYDROCHLORIDE 300 MG/1
300 CAPSULE ORAL 3 TIMES DAILY
Qty: 21 CAPSULE | Refills: 0 | Status: SHIPPED | OUTPATIENT
Start: 2024-02-07 | End: 2024-02-14

## 2024-02-07 RX ORDER — ALBUTEROL SULFATE 90 UG/1
2 AEROSOL, METERED RESPIRATORY (INHALATION) EVERY 6 HOURS PRN
Qty: 3 EACH | Refills: 1 | Status: SHIPPED | OUTPATIENT
Start: 2024-02-07

## 2024-02-07 RX ORDER — FLUCONAZOLE 150 MG/1
150 TABLET ORAL ONCE
Qty: 1 TABLET | Refills: 0 | Status: SHIPPED | OUTPATIENT
Start: 2024-02-07 | End: 2024-02-07

## 2024-02-07 RX ORDER — PREDNISONE 5 MG/1
5 TABLET ORAL EVERY MORNING
Qty: 30 TABLET | Refills: 0 | Status: SHIPPED | OUTPATIENT
Start: 2024-02-07

## 2024-02-07 NOTE — PROGRESS NOTES
Subjective:   Patient ID: Leonie Wood is a 53 year old female.    HPI  Ms. Wood is a pleasant 54-year-old female presenting for video visit today for left upper dental pain for the past several days.  She has seen her dentist and will be having a root canal by the end of this month.  She has had pain there and thinks that this might be infected.  Her dentist did not give her any antibiotics at the time when she had seen the dentist.  No fever no cough no difficulty swallowing no ear pain. I had reviewed past medical and family histories together with allergy and medication lists documented.    History/Other:   Review of Systems   Constitutional:  Negative for fever.   Gastrointestinal:  Negative for abdominal pain, nausea and vomiting.     Current Outpatient Medications   Medication Sig Dispense Refill    clindamycin 300 MG Oral Cap Take 1 capsule (300 mg total) by mouth 3 (three) times daily for 7 days. 21 capsule 0    fluconazole (DIFLUCAN) 150 MG Oral Tab Take 1 tablet (150 mg total) by mouth once for 1 dose. 1 tablet 0    albuterol 108 (90 Base) MCG/ACT Inhalation Aero Soln Inhale 2 puffs into the lungs every 6 (six) hours as needed for Wheezing. 3 each 1    oxyCODONE-acetaminophen  MG Oral Tab Take 1-2 tablets by mouth every 8 (eight) hours as needed for Pain. 90 tablet 0    [START ON 2/27/2024] oxyCODONE-acetaminophen  MG Oral Tab Take 1-2 tablets by mouth every 8 (eight) hours as needed for Pain. 90 tablet 0    [START ON 3/26/2024] oxyCODONE-acetaminophen  MG Oral Tab Take 1-2 tablets by mouth every 8 (eight) hours as needed for Pain. 90 tablet 0    LORazepam 1 MG Oral Tab Take 1 tablet (1 mg total) by mouth 2 (two) times daily as needed for Anxiety. 30 tablet 0    traMADol 50 MG Oral Tab Take 1-2 tablets ( mg total) by mouth every 4 to 6 hours as needed for Pain. 240 tablet 0    predniSONE 5 MG Oral Tab Take 1 tablet (5 mg total) by mouth every morning. 30 tablet 0     oxyCODONE-acetaminophen  MG Oral Tab Take 1 tablet by mouth every 8 (eight) hours as needed for Pain. 90 tablet 0    predniSONE 1 MG Oral Tab TAKE 4 TABLETS BY MOUTH DAILY FOR 7 DAYS, THEN 3 TABLETS DAILY FOR 7 DAYS, THEN 2 TABLETS DAILY FOR 14 DAYS. 90 tablet 0    mupirocin 2 % External Ointment Apply 1 Application topically 3 (three) times daily. 15 g 0    predniSONE 10 MG Oral Tab TAKE 1 TABLET (10 MG TOTAL) BY MOUTH 2 (TWO) TIMES DAILY FOR 3 DAYS, THEN 1 TABLET (10 MG TOTAL) DAILY FOR 3 DAYS, THEN 1/2 TABLET (5 MG TOTAL) DAILY FOR 3 DAYS. 11 tablet 0    rosuvastatin 10 MG Oral Tab Take 1 tablet (10 mg total) by mouth nightly. 90 tablet 0    metoprolol succinate ER 50 MG Oral Tablet 24 Hr Take 1 tablet (50 mg total) by mouth daily. (Patient not taking: Reported on 1/31/2024) 90 tablet 1    metoprolol succinate ER 25 MG Oral Tablet 24 Hr Take 1 tablet (25 mg total) by mouth daily. (Patient not taking: Reported on 1/31/2024) 30 tablet 1    CYCLOBENZAPRINE 10 MG Oral Tab TAKE 1 TABLET BY MOUTH NIGHTLY AS NEEDED FOR MUSCLE SPASMS 30 tablet 5    Ibuprofen 100 MG Oral Chew Tab       predniSONE 50 MG Oral Tab Take 1 tablet (50 mg total) by mouth as needed. For allergy flare-up      aspirin  MG Oral Tab EC TAKE ONE TABLET BY MOUTH ONE TIME DAILY POST OPERATIVELY      Fexofenadine HCl 180 MG Oral Tab Take 1 tablet (180 mg total) by mouth daily.      Naloxone HCl 4 MG/0.1ML Nasal Liquid 4 mg by Nasal route as needed. If patient remains unresponsive, repeat dose in other nostril 2-5 minutes after first dose. 1 kit 0    famoTIDine 20 MG Oral Tab Take 1 tablet (20 mg total) by mouth 2 (two) times daily.      Ketotifen Fumarate (KETOTIFEN HYDROGEN FUMARATE) Does not apply Powder 2 mg.      psyllium 28 % Oral Powd Pack Take 1 packet by mouth as needed.      cetirizine 10 MG Oral Tab Take 1-2 tablets (10-20 mg total) by mouth daily.      caffeine 200 MG Oral Tab Take 1 tablet (200 mg total) by mouth daily.        Allergies:  Allergies   Allergen Reactions    Hydrochlorothiazide SWELLING    Latex ANAPHYLAXIS    Latex ANAPHYLAXIS and RASH    Morphine SWELLING     Legs swelling      Phentermine HIVES and SWELLING    Penicillin G RASH       Objective:   Physical Exam  Constitutional:       General: She is not in acute distress.  Neurological:      Mental Status: She is alert.         Assessment & Plan:   1. Pain, dental    -We will send for clindamycin 300 mg 1 tablet 3 times a day for 7 days  - Will prescribe with Diflucan  - May take regular pain medication regimen  Call or come in sooner symptoms worsen or persist      This note was prepared using [employer] voice recognition dictation software. As a result errors may occur. When identified these errors have been corrected. While every attempt is made to correct errors during dictation discrepancies may still exist            No orders of the defined types were placed in this encounter.      Meds This Visit:  Requested Prescriptions     Signed Prescriptions Disp Refills    clindamycin 300 MG Oral Cap 21 capsule 0     Sig: Take 1 capsule (300 mg total) by mouth 3 (three) times daily for 7 days.    fluconazole (DIFLUCAN) 150 MG Oral Tab 1 tablet 0     Sig: Take 1 tablet (150 mg total) by mouth once for 1 dose.    albuterol 108 (90 Base) MCG/ACT Inhalation Aero Soln 3 each 1     Sig: Inhale 2 puffs into the lungs every 6 (six) hours as needed for Wheezing.       Imaging & Referrals:  None

## 2024-02-21 ENCOUNTER — TELEPHONE (OUTPATIENT)
Dept: FAMILY MEDICINE CLINIC | Facility: CLINIC | Age: 54
End: 2024-02-21

## 2024-02-21 ENCOUNTER — TELEMEDICINE (OUTPATIENT)
Dept: FAMILY MEDICINE CLINIC | Facility: CLINIC | Age: 54
End: 2024-02-21
Payer: COMMERCIAL

## 2024-02-21 DIAGNOSIS — M47.816 OSTEOARTHRITIS OF LUMBAR SPINE, UNSPECIFIED SPINAL OSTEOARTHRITIS COMPLICATION STATUS: ICD-10-CM

## 2024-02-21 DIAGNOSIS — G89.4 CHRONIC PAIN SYNDROME: ICD-10-CM

## 2024-02-21 DIAGNOSIS — K08.89 PAIN, DENTAL: Primary | ICD-10-CM

## 2024-02-21 RX ORDER — OXYCODONE AND ACETAMINOPHEN 10; 325 MG/1; MG/1
1-2 TABLET ORAL EVERY 8 HOURS PRN
Qty: 90 TABLET | Refills: 0 | Status: SHIPPED | OUTPATIENT
Start: 2024-02-21

## 2024-02-21 RX ORDER — OXYCODONE AND ACETAMINOPHEN 10; 325 MG/1; MG/1
1-2 TABLET ORAL EVERY 8 HOURS PRN
Qty: 90 TABLET | Refills: 0 | Status: SHIPPED | OUTPATIENT
Start: 2024-02-27 | End: 2024-02-21

## 2024-02-21 RX ORDER — CLINDAMYCIN HYDROCHLORIDE 300 MG/1
300 CAPSULE ORAL 3 TIMES DAILY
Qty: 30 CAPSULE | Refills: 0 | Status: SHIPPED | OUTPATIENT
Start: 2024-02-21 | End: 2024-03-02

## 2024-02-21 RX ORDER — PREDNISONE 1 MG/1
TABLET ORAL
Qty: 90 TABLET | Refills: 0 | Status: SHIPPED | OUTPATIENT
Start: 2024-02-21

## 2024-02-21 NOTE — PATIENT INSTRUCTIONS
Thank you for choosing Cole Garvin MD at [facility]  To Do: Leonie Wood  1. Please take meds as directed.    [facility] is located in [address].  Monday, Tuesday & Friday - 8 a.m. to 4 p.m.  Wednesday, Thursday - 7 a.m. to 3 p.m.  The lab is closed daily from 12 p.m.-12:30 p.m.  Saturday lab hours by appointment. Call [phone] to schedule the appointment.   Please signup for [employer], which is electronic access to your record if you have not done so.  All your results will post on there.  [email or web address]   You can NOW use [employer] to book your appointments with us, or consider using open access scheduling which is through the [place] website [email or web address] and type in Cole Garvin MD and follow the links for \"Schedule Online Now\"    To schedule Imaging or tests at [facility] call Central Scheduling [phone], Go to [facility] (For example: CT scans, X rays, Ultrasound, MRI)  Cardiac Testing in ER building Building A second floor Cardiac Testing [phone] (For example: Holter Monitor, Cardiac Stress tests,Event Monitor, or 2D Echocardiograms)  [facility] call [phone] usually in [facility]  [facility] in [place] at [address] Mon-Fri at 8am-7:30 p.m., and Sat/Sun 9:00a.m.-4:30 p.m.  Also at [address] [place] [address]  Call [phone] for info     Please call our office about any questions regarding your treatment/medicines/tests as a result of today's visit.  For your safety, read the entire package insert of all medicines prescribed to you and be aware of all of the risks of treatment even beyond those discussed today.  All therapies have potential risk of harm or side effects or medication interactions.  It is your duty and for your safety to discuss with the pharmacist and our office with questions, and to notify us and stop treatment if problems arise, but know that our intention is that the benefits outweigh  those potential risks and we strive to make you healthier and to improve your quality of life.    Referrals, and Radiology Information:    If your insurance requires a referral to a specialist, please allow 5 business days to process your referral request.    If Cole Garvin MD orders a CT or MRI, it may take up to 10 business days to receive approval from your insurance company. Once our office has called informing you that the insurance company approved your testing, please call Central Scheduling at [phone]  Please allow our office 5 business days to contact you regarding any testing results.    Refill policies:   Allow 3 business days for refills; controlled substances may take longer and must be picked up from the office in person.  Narcotic medications can only be filled in 30 day increments and must be refilled at an office visit only.  If your prescription is due for a refill, you may be due for a follow-up appointment.  We cannot refill your maintenance medications at a preventative wellness visit.  To best provide you care, patients receiving maintenance medications need to be seen at least twice a year.

## 2024-02-21 NOTE — PROGRESS NOTES
Subjective:   Patient ID: Leonie Wood is a 53 year old female.    HPI  Ms. Wood is a pleasant 53-year-old female with history of chronic pain syndrome secondary to lumbar degenerative joint disease, status post right knee arthroscopy for torn meniscus and history of hives presenting for video today as she will be having procedure to have dental restoration of left upper fracture 2.  She will be needing prophylactic antibiotics for this.  This will be done as soon as possible.  She also reports that the hives is getting better and will need to go down on prednisone 1 mg as directed.  She is also requesting her pain medication refills to be sent over to her pharmacy. I had reviewed past medical and family histories together with allergy and medication lists documented.    History/Other:   Review of Systems  Constitutional: Negative for fatigue and fever.  HENT: Negative for sore throat and trouble swallowing.    Respiratory: Negative for cough.    Gastrointestinal: Negative for abdominal pain, diarrhea, nausea and vomiting.  Musculoskeletal: Positive for arthralgias, back pain  Current Outpatient Medications   Medication Sig Dispense Refill    [START ON 2/27/2024] oxyCODONE-acetaminophen  MG Oral Tab Take 1-2 tablets by mouth every 8 (eight) hours as needed for Pain. 90 tablet 0    predniSONE 1 MG Oral Tab TAKE 4 TABLETS BY MOUTH DAILY FOR 7 DAYS, THEN 3 TABLETS DAILY FOR 7 DAYS, THEN 2 TABLETS DAILY FOR 14 DAYS. 90 tablet 0    clindamycin 300 MG Oral Cap Take 1 capsule (300 mg total) by mouth 3 (three) times daily for 10 days. 30 capsule 0    albuterol 108 (90 Base) MCG/ACT Inhalation Aero Soln Inhale 2 puffs into the lungs every 6 (six) hours as needed for Wheezing. 3 each 1    predniSONE 5 MG Oral Tab Take 1 tablet (5 mg total) by mouth every morning. 30 tablet 0    oxyCODONE-acetaminophen  MG Oral Tab Take 1-2 tablets by mouth every 8 (eight) hours as needed for Pain. 90 tablet 0    [START  ON 3/26/2024] oxyCODONE-acetaminophen  MG Oral Tab Take 1-2 tablets by mouth every 8 (eight) hours as needed for Pain. 90 tablet 0    LORazepam 1 MG Oral Tab Take 1 tablet (1 mg total) by mouth 2 (two) times daily as needed for Anxiety. 30 tablet 0    traMADol 50 MG Oral Tab Take 1-2 tablets ( mg total) by mouth every 4 to 6 hours as needed for Pain. 240 tablet 0    oxyCODONE-acetaminophen  MG Oral Tab Take 1 tablet by mouth every 8 (eight) hours as needed for Pain. 90 tablet 0    mupirocin 2 % External Ointment Apply 1 Application topically 3 (three) times daily. 15 g 0    predniSONE 10 MG Oral Tab TAKE 1 TABLET (10 MG TOTAL) BY MOUTH 2 (TWO) TIMES DAILY FOR 3 DAYS, THEN 1 TABLET (10 MG TOTAL) DAILY FOR 3 DAYS, THEN 1/2 TABLET (5 MG TOTAL) DAILY FOR 3 DAYS. 11 tablet 0    rosuvastatin 10 MG Oral Tab Take 1 tablet (10 mg total) by mouth nightly. 90 tablet 0    metoprolol succinate ER 50 MG Oral Tablet 24 Hr Take 1 tablet (50 mg total) by mouth daily. (Patient not taking: Reported on 1/31/2024) 90 tablet 1    metoprolol succinate ER 25 MG Oral Tablet 24 Hr Take 1 tablet (25 mg total) by mouth daily. (Patient not taking: Reported on 1/31/2024) 30 tablet 1    CYCLOBENZAPRINE 10 MG Oral Tab TAKE 1 TABLET BY MOUTH NIGHTLY AS NEEDED FOR MUSCLE SPASMS 30 tablet 5    Ibuprofen 100 MG Oral Chew Tab       predniSONE 50 MG Oral Tab Take 1 tablet (50 mg total) by mouth as needed. For allergy flare-up      aspirin  MG Oral Tab EC TAKE ONE TABLET BY MOUTH ONE TIME DAILY POST OPERATIVELY      Fexofenadine HCl 180 MG Oral Tab Take 1 tablet (180 mg total) by mouth daily.      Naloxone HCl 4 MG/0.1ML Nasal Liquid 4 mg by Nasal route as needed. If patient remains unresponsive, repeat dose in other nostril 2-5 minutes after first dose. 1 kit 0    famoTIDine 20 MG Oral Tab Take 1 tablet (20 mg total) by mouth 2 (two) times daily.      Ketotifen Fumarate (KETOTIFEN HYDROGEN FUMARATE) Does not apply Powder 2 mg.       psyllium 28 % Oral Powd Pack Take 1 packet by mouth as needed.      cetirizine 10 MG Oral Tab Take 1-2 tablets (10-20 mg total) by mouth daily.      caffeine 200 MG Oral Tab Take 1 tablet (200 mg total) by mouth daily.       Allergies:  Allergies   Allergen Reactions    Hydrochlorothiazide SWELLING    Latex ANAPHYLAXIS    Latex ANAPHYLAXIS and RASH    Morphine SWELLING     Legs swelling      Phentermine HIVES and SWELLING    Penicillin G RASH       Objective:   Physical Exam  Constitutional:       General: She is not in acute distress.  Neurological:      Mental Status: She is alert.   Psychiatric:         Mood and Affect: Mood normal.         Behavior: Behavior normal.         Assessment & Plan:   1. Pain, dental   -Will send for clindamycin for prophylaxis  - Continue current pain medication regimen   2. Osteoarthritis of lumbar spine, unspecified spinal osteoarthritis complication status   -Pain medication refilled and sent to her pharmacy   3. Chronic pain syndrome   -As above mention     Hives is getting better and will send for prednisone 1 mg taper    Follow-up as scheduled or as needed    This note was prepared using [employer] voice recognition dictation software. As a result errors may occur. When identified these errors have been corrected. While every attempt is made to correct errors during dictation discrepancies may still exist          No orders of the defined types were placed in this encounter.      Meds This Visit:  Requested Prescriptions     Signed Prescriptions Disp Refills    oxyCODONE-acetaminophen  MG Oral Tab 90 tablet 0     Sig: Take 1-2 tablets by mouth every 8 (eight) hours as needed for Pain.    predniSONE 1 MG Oral Tab 90 tablet 0     Sig: TAKE 4 TABLETS BY MOUTH DAILY FOR 7 DAYS, THEN 3 TABLETS DAILY FOR 7 DAYS, THEN 2 TABLETS DAILY FOR 14 DAYS.    clindamycin 300 MG Oral Cap 30 capsule 0     Sig: Take 1 capsule (300 mg total) by mouth 3 (three) times daily for 10 days.        Imaging & Referrals:  None

## 2024-02-21 NOTE — TELEPHONE ENCOUNTER
Pt called and said she had a phone appt with Dr. Garvin this morning and he was suppose to call in her oxyCODONE-acetaminophen  MG Oral Tab  but she is seeing on her [facility] it's dated for 2/27/24.  She said he told her it would be called in today.

## 2024-02-28 DIAGNOSIS — S83.241A TEAR OF MEDIAL MENISCUS OF RIGHT KNEE, UNSPECIFIED TEAR TYPE, UNSPECIFIED WHETHER OLD OR CURRENT TEAR, INITIAL ENCOUNTER: ICD-10-CM

## 2024-02-28 DIAGNOSIS — S83.511A RUPTURE OF ANTERIOR CRUCIATE LIGAMENT OF RIGHT KNEE, INITIAL ENCOUNTER: ICD-10-CM

## 2024-02-28 DIAGNOSIS — M25.561 CHRONIC PAIN OF RIGHT KNEE: ICD-10-CM

## 2024-02-28 DIAGNOSIS — G89.29 CHRONIC PAIN OF RIGHT KNEE: ICD-10-CM

## 2024-02-28 DIAGNOSIS — G89.4 CHRONIC PAIN SYNDROME: ICD-10-CM

## 2024-02-28 DIAGNOSIS — M47.816 OSTEOARTHRITIS OF LUMBAR SPINE, UNSPECIFIED SPINAL OSTEOARTHRITIS COMPLICATION STATUS: ICD-10-CM

## 2024-02-28 RX ORDER — TRAMADOL HYDROCHLORIDE 50 MG/1
TABLET ORAL
Qty: 240 TABLET | Refills: 0 | Status: SHIPPED | OUTPATIENT
Start: 2024-02-28

## 2024-03-18 DIAGNOSIS — M47.816 OSTEOARTHRITIS OF LUMBAR SPINE, UNSPECIFIED SPINAL OSTEOARTHRITIS COMPLICATION STATUS: ICD-10-CM

## 2024-03-18 DIAGNOSIS — G89.4 CHRONIC PAIN SYNDROME: ICD-10-CM

## 2024-03-18 RX ORDER — OXYCODONE AND ACETAMINOPHEN 10; 325 MG/1; MG/1
1 TABLET ORAL EVERY 8 HOURS PRN
Qty: 90 TABLET | Refills: 0 | Status: SHIPPED | OUTPATIENT
Start: 2024-03-18

## 2024-03-19 ENCOUNTER — TELEMEDICINE (OUTPATIENT)
Dept: FAMILY MEDICINE CLINIC | Facility: CLINIC | Age: 54
End: 2024-03-19
Payer: COMMERCIAL

## 2024-03-19 DIAGNOSIS — R51.9 FACIAL PAIN: Primary | ICD-10-CM

## 2024-03-19 PROCEDURE — 99213 OFFICE O/P EST LOW 20 MIN: CPT | Performed by: FAMILY MEDICINE

## 2024-03-19 RX ORDER — PREDNISONE 1 MG/1
TABLET ORAL
Qty: 90 TABLET | Refills: 0 | Status: SHIPPED | OUTPATIENT
Start: 2024-03-19

## 2024-03-19 RX ORDER — PREDNISONE 5 MG/1
5 TABLET ORAL EVERY MORNING
Qty: 90 TABLET | Refills: 0 | Status: SHIPPED | OUTPATIENT
Start: 2024-03-19

## 2024-03-19 NOTE — PROGRESS NOTES
Subjective:   Patient ID: Leonie Wood is a 53 year old female.    HPI  Ms. Wood is a pleasant 53-year-old female presenting for video visit today for left-sided facial pain.  She recently had a sinus and tooth infection for which doxycycline was changed by her dentist with [facility].  She continues to have left-sided facial pain and swelling on the left cheek and left eye.  She is wondering if prednisone would help.  She had taken this in the past for hives which she does not have currently.  No fever no chest pain or shortness of breath no nausea no vomiting no abdominal pain.  She will have dental procedure in the next few days. I had reviewed past medical and family histories together with allergy and medication lists documented.    History/Other:   Review of Systems   Constitutional:  Negative for fatigue and fever.   Respiratory:  Negative for cough and shortness of breath.    Cardiovascular:  Negative for chest pain.   Gastrointestinal:  Negative for abdominal pain, diarrhea, nausea and vomiting.     Current Outpatient Medications   Medication Sig Dispense Refill    predniSONE 1 MG Oral Tab TAKE 4 TABLETS BY MOUTH DAILY FOR 7 DAYS, THEN 3 TABLETS DAILY FOR 7 DAYS, THEN 2 TABLETS DAILY FOR 14 DAYS. 90 tablet 0    predniSONE 5 MG Oral Tab Take 1 tablet (5 mg total) by mouth every morning. 90 tablet 0    oxyCODONE-acetaminophen  MG Oral Tab Take 1 tablet by mouth every 8 (eight) hours as needed for Pain. 90 tablet 0    traMADol 50 MG Oral Tab Take 1-2 tablets ( mg total) by mouth every 4 to 6 hours as needed for Pain. 240 tablet 0    albuterol 108 (90 Base) MCG/ACT Inhalation Aero Soln Inhale 2 puffs into the lungs every 6 (six) hours as needed for Wheezing. 3 each 1    oxyCODONE-acetaminophen  MG Oral Tab Take 1-2 tablets by mouth every 8 (eight) hours as needed for Pain. 90 tablet 0    [START ON 3/26/2024] oxyCODONE-acetaminophen  MG Oral Tab Take 1-2 tablets by mouth every 8  (eight) hours as needed for Pain. 90 tablet 0    LORazepam 1 MG Oral Tab Take 1 tablet (1 mg total) by mouth 2 (two) times daily as needed for Anxiety. 30 tablet 0    oxyCODONE-acetaminophen  MG Oral Tab Take 1 tablet by mouth every 8 (eight) hours as needed for Pain. 90 tablet 0    mupirocin 2 % External Ointment Apply 1 Application topically 3 (three) times daily. 15 g 0    predniSONE 10 MG Oral Tab TAKE 1 TABLET (10 MG TOTAL) BY MOUTH 2 (TWO) TIMES DAILY FOR 3 DAYS, THEN 1 TABLET (10 MG TOTAL) DAILY FOR 3 DAYS, THEN 1/2 TABLET (5 MG TOTAL) DAILY FOR 3 DAYS. 11 tablet 0    rosuvastatin 10 MG Oral Tab Take 1 tablet (10 mg total) by mouth nightly. 90 tablet 0    metoprolol succinate ER 50 MG Oral Tablet 24 Hr Take 1 tablet (50 mg total) by mouth daily. (Patient not taking: Reported on 1/31/2024) 90 tablet 1    metoprolol succinate ER 25 MG Oral Tablet 24 Hr Take 1 tablet (25 mg total) by mouth daily. (Patient not taking: Reported on 1/31/2024) 30 tablet 1    CYCLOBENZAPRINE 10 MG Oral Tab TAKE 1 TABLET BY MOUTH NIGHTLY AS NEEDED FOR MUSCLE SPASMS 30 tablet 5    Ibuprofen 100 MG Oral Chew Tab       predniSONE 50 MG Oral Tab Take 1 tablet (50 mg total) by mouth as needed. For allergy flare-up      aspirin  MG Oral Tab EC TAKE ONE TABLET BY MOUTH ONE TIME DAILY POST OPERATIVELY      Fexofenadine HCl 180 MG Oral Tab Take 1 tablet (180 mg total) by mouth daily.      Naloxone HCl 4 MG/0.1ML Nasal Liquid 4 mg by Nasal route as needed. If patient remains unresponsive, repeat dose in other nostril 2-5 minutes after first dose. 1 kit 0    famoTIDine 20 MG Oral Tab Take 1 tablet (20 mg total) by mouth 2 (two) times daily.      Ketotifen Fumarate (KETOTIFEN HYDROGEN FUMARATE) Does not apply Powder 2 mg.      psyllium 28 % Oral Powd Pack Take 1 packet by mouth as needed.      cetirizine 10 MG Oral Tab Take 1-2 tablets (10-20 mg total) by mouth daily.      caffeine 200 MG Oral Tab Take 1 tablet (200 mg total) by mouth  daily.       Allergies:  Allergies   Allergen Reactions    Hydrochlorothiazide SWELLING    Latex ANAPHYLAXIS    Latex ANAPHYLAXIS and RASH    Morphine SWELLING     Legs swelling      Phentermine HIVES and SWELLING    Penicillin G RASH       Objective:   Physical Exam  Constitutional:       General: She is not in acute distress.  Neurological:      Mental Status: She is alert.   Psychiatric:         Mood and Affect: Mood normal.         Assessment & Plan:   1. Facial pain    -Recently treated with Z-Antonio  - Perhaps there is underlying inflammation with this and may try prednisone which was sent to her pharmacy  - Continue follow-up with her dentist  - Call or come sooner symptoms worsen or persist    This note was prepared using [employer] voice recognition dictation software. As a result errors may occur. When identified these errors have been corrected. While every attempt is made to correct errors during dictation discrepancies may still exist            No orders of the defined types were placed in this encounter.      Meds This Visit:  Requested Prescriptions     Signed Prescriptions Disp Refills    predniSONE 1 MG Oral Tab 90 tablet 0     Sig: TAKE 4 TABLETS BY MOUTH DAILY FOR 7 DAYS, THEN 3 TABLETS DAILY FOR 7 DAYS, THEN 2 TABLETS DAILY FOR 14 DAYS.    predniSONE 5 MG Oral Tab 90 tablet 0     Sig: Take 1 tablet (5 mg total) by mouth every morning.       Imaging & Referrals:  None

## 2024-03-26 DIAGNOSIS — G89.4 CHRONIC PAIN SYNDROME: ICD-10-CM

## 2024-03-26 DIAGNOSIS — G89.29 CHRONIC PAIN OF RIGHT KNEE: ICD-10-CM

## 2024-03-26 DIAGNOSIS — M47.816 OSTEOARTHRITIS OF LUMBAR SPINE, UNSPECIFIED SPINAL OSTEOARTHRITIS COMPLICATION STATUS: ICD-10-CM

## 2024-03-26 DIAGNOSIS — M25.561 CHRONIC PAIN OF RIGHT KNEE: ICD-10-CM

## 2024-03-26 DIAGNOSIS — S83.241A TEAR OF MEDIAL MENISCUS OF RIGHT KNEE, UNSPECIFIED TEAR TYPE, UNSPECIFIED WHETHER OLD OR CURRENT TEAR, INITIAL ENCOUNTER: ICD-10-CM

## 2024-03-26 DIAGNOSIS — S83.511A RUPTURE OF ANTERIOR CRUCIATE LIGAMENT OF RIGHT KNEE, INITIAL ENCOUNTER: ICD-10-CM

## 2024-03-26 RX ORDER — TRAMADOL HYDROCHLORIDE 50 MG/1
TABLET ORAL
Qty: 240 TABLET | Refills: 0 | Status: SHIPPED | OUTPATIENT
Start: 2024-03-26

## 2024-03-26 NOTE — TELEPHONE ENCOUNTER
Requesting Tramadol 50mg  Last OV: 3/19/24 VV  RTC: prn  Last Rx'd 2/28/24 #240 with 0 refills    Future Appointments   Date Time Provider Department Center   4/24/2024 10:30 AM Cole Garvin MD [facility] [address] [facility] [address]     Per [place] , last dispensed 3/1/24 #240    Controlled med:  Rx pended and routed for approval/denial

## 2024-04-15 DIAGNOSIS — M47.816 OSTEOARTHRITIS OF LUMBAR SPINE, UNSPECIFIED SPINAL OSTEOARTHRITIS COMPLICATION STATUS: ICD-10-CM

## 2024-04-15 DIAGNOSIS — G89.4 CHRONIC PAIN SYNDROME: ICD-10-CM

## 2024-04-15 RX ORDER — OXYCODONE AND ACETAMINOPHEN 10; 325 MG/1; MG/1
1-2 TABLET ORAL EVERY 8 HOURS PRN
Qty: 90 TABLET | Refills: 0 | Status: SHIPPED | OUTPATIENT
Start: 2024-04-15

## 2024-04-15 NOTE — TELEPHONE ENCOUNTER
Pt calling for a refill:      onoxyCODONE-acetaminophen  MG Oral Tab,    sent to the  [facility] - [place], [place] - [address] [phone], [phone]                                   pharmacy.

## 2024-04-24 ENCOUNTER — OFFICE VISIT (OUTPATIENT)
Dept: FAMILY MEDICINE CLINIC | Facility: CLINIC | Age: 54
End: 2024-04-24
Payer: COMMERCIAL

## 2024-04-24 VITALS
RESPIRATION RATE: 16 BRPM | SYSTOLIC BLOOD PRESSURE: 168 MMHG | HEIGHT: 69 IN | DIASTOLIC BLOOD PRESSURE: 94 MMHG | OXYGEN SATURATION: 98 % | BODY MASS INDEX: 37.18 KG/M2 | HEART RATE: 94 BPM | WEIGHT: 251 LBS | TEMPERATURE: 98 F

## 2024-04-24 DIAGNOSIS — I10 PRIMARY HYPERTENSION: ICD-10-CM

## 2024-04-24 DIAGNOSIS — S83.511A RUPTURE OF ANTERIOR CRUCIATE LIGAMENT OF RIGHT KNEE, INITIAL ENCOUNTER: ICD-10-CM

## 2024-04-24 DIAGNOSIS — G89.4 CHRONIC PAIN SYNDROME: Primary | ICD-10-CM

## 2024-04-24 DIAGNOSIS — F41.9 ANXIETY: ICD-10-CM

## 2024-04-24 DIAGNOSIS — M25.561 CHRONIC PAIN OF RIGHT KNEE: ICD-10-CM

## 2024-04-24 DIAGNOSIS — M47.816 OSTEOARTHRITIS OF LUMBAR SPINE, UNSPECIFIED SPINAL OSTEOARTHRITIS COMPLICATION STATUS: ICD-10-CM

## 2024-04-24 DIAGNOSIS — G89.29 CHRONIC PAIN OF RIGHT KNEE: ICD-10-CM

## 2024-04-24 DIAGNOSIS — S83.241A TEAR OF MEDIAL MENISCUS OF RIGHT KNEE, UNSPECIFIED TEAR TYPE, UNSPECIFIED WHETHER OLD OR CURRENT TEAR, INITIAL ENCOUNTER: ICD-10-CM

## 2024-04-24 PROCEDURE — 99214 OFFICE O/P EST MOD 30 MIN: CPT | Performed by: FAMILY MEDICINE

## 2024-04-24 RX ORDER — EPINEPHRINE 0.3 MG/.3ML
0.3 INJECTION SUBCUTANEOUS ONCE
COMMUNITY
Start: 2024-01-31

## 2024-04-24 RX ORDER — OXYCODONE AND ACETAMINOPHEN 10; 325 MG/1; MG/1
1 TABLET ORAL EVERY 4 HOURS PRN
Qty: 90 TABLET | Refills: 0 | Status: SHIPPED | OUTPATIENT
Start: 2024-04-24

## 2024-04-24 RX ORDER — OXYCODONE AND ACETAMINOPHEN 10; 325 MG/1; MG/1
1 TABLET ORAL EVERY 4 HOURS PRN
Qty: 90 TABLET | Refills: 0 | Status: SHIPPED | OUTPATIENT
Start: 2024-06-24

## 2024-04-24 RX ORDER — LORAZEPAM 1 MG/1
1 TABLET ORAL 2 TIMES DAILY PRN
Qty: 30 TABLET | Refills: 0 | Status: SHIPPED | OUTPATIENT
Start: 2024-04-24

## 2024-04-24 RX ORDER — TRAMADOL HYDROCHLORIDE 50 MG/1
TABLET ORAL
Qty: 240 TABLET | Refills: 0 | Status: SHIPPED | OUTPATIENT
Start: 2024-04-24

## 2024-04-24 RX ORDER — OXYCODONE AND ACETAMINOPHEN 10; 325 MG/1; MG/1
1 TABLET ORAL EVERY 4 HOURS PRN
Qty: 90 TABLET | Refills: 0 | Status: SHIPPED | OUTPATIENT
Start: 2024-05-24

## 2024-04-24 NOTE — PATIENT INSTRUCTIONS
Thank you for choosing Cole Garvin MD at [facility]  To Do: Leonie Wood  1. Please take meds as directed.  2. High Blood pressure  What Is a Normal Blood Pressure?  The Joint National Committee on Prevention, Detection, Evaluation, and Treatment of High Blood Pressure has classified blood pressure measurements into several categories:  Normal blood pressure is systolic pressure less than 120 and diastolic pressure less than 80 mmHg.   \"Prehypertension\" is systolic pressure of 120-139 or diastolic pressure of 80-89 mmHg.   Stage 1 Hypertension is blood pressure greater than systolic pressure of 140-159 or diastolic pressure of 90-99 mmHg or greater.   Stage 2 Hypertension is systolic pressure of 160 or greater or diastolic pressure of 100 or greater.  What Health Problems Are Associated With High Blood Pressure?  Several potentially serious health conditions are linked to high blood pressure, including:  Atherosclerosis: a disease of the arteries caused by a buildup of plaque, or fatty material, on the inside walls of the blood vessels; hypertension contributes to this buildup by putting added stress and force on the artery walls.   Heart Disease: Heart failure (the heart is not strong enough to pump blood adequately), ischemic heart disease (the heart tissue doesn't get enough blood), and hypertensive hypertrophic cardiomyopathy (thickened, abnormally functioning heart muscle) are all associated with high blood pressure.   Kidney Disease: Hypertension can damage the blood vessels and filters in the kidneys, so that the kidneys cannot excrete waste properly.   Stroke: Hypertension can lead to stroke, either by contributing to the process of atherosclerosis (which can lead to blockages and/or clots), or by weakening the blood vessel wall and causing it to rupture.   Eye Disease: Hypertension can damage the very small blood vessels in the retina.  Bleeding from the aorta, the large blood vessel  that supplies blood to the abdomen, pelvis, and legs   Heart failure   Poor blood supply to the legs  Erectile Dysfunction  Problems with your vision    Overview  \"Blood pressure\" is the force of blood pushing against the walls of the arteries as the heart pumps blood. If this pressure rises and stays high over time, it can damage the body in many ways.  About 1 in 3 adults in the [place] has HBP. The condition itself usually has no signs or symptoms. You can have it for years without knowing it. During this time, though, HBP can damage your heart, blood vessels, kidneys, and other parts of your body.  Knowing your blood pressure numbers is important, even when you're feeling fine. If your blood pressure is normal, you can work with your health care team to keep it that way. If your blood pressure is too high, treatment may help prevent damage to your body's organs.    By [facility] staff   DASH stands for Dietary Approaches to Stop Hypertension. The DASH diet is a lifelong approach to healthy eating that's designed to help treat or prevent high blood pressure (hypertension). The DASH diet encourages you to reduce the sodium in your diet and eat a variety of foods rich in nutrients that help lower blood pressure, such as potassium, calcium and magnesium.   By following the DASH diet, you may be able to reduce your blood pressure by a few points in just two weeks. Over time, your blood pressure could drop by eight to 14 points, which can make a significant difference in your health risks.   DASH DIET  The low-salt Dietary Approaches to Stop Hypertension (DASH) diet is proven to help lower blood pressure. Its effects on blood pressure are sometimes seen within a few weeks.  This diet is not only rich in important nutrients and fiber, but it also includes foods that contain far more potassium (4,700 milligrams (mg)/day), calcium (1,250 mg/day), and magnesium (500 mg/day) and much less sodium (salt) than the  typical American diet.  Limit sodium to no more than 2,300 mg a day (eating only 1,500 mg a day is an even better goal).   Reduce saturated fat to no more than 6% of daily calories and total fat to 27% of daily calories. Low-fat dairy products appear to be especially beneficial for lowering systolic blood pressure.   When choosing fats, select monounsaturated oils, such as olive or canola oils.   Choose whole grains over white flour or pasta products.   Choose fresh fruits and vegetables every day. Many of these foods are rich in potassium, fiber, or both.   Eat nuts, seeds, or legumes (dried beans or peas) daily.   Choose modest amounts of protein (no more than 18% of total daily calories). Fish, skinless poultry, and soy products are the best protein sources.   Other daily nutrient goals in the DASH diet include limiting carbohydrates to 55% of daily calories and dietary cholesterol to 150 mg. Try to get at least 30 grams (g) of daily fiber.    Grains (6 to 8 servings a day)  Grains include bread, cereal, rice and pasta. Examples of one serving of grains include 1 slice whole-wheat bread, 1 ounce (oz.) dry cereal, or 1/2 cup cooked cereal, rice or pasta.   Focus on whole grains because they have more fiber and nutrients than do refined grains. For instance, use brown rice instead of white rice, whole-wheat pasta instead of regular pasta and whole-grain bread instead of white bread. Look for products labeled \"100 percent whole grain\" or \"100 percent whole wheat.\"   Grains are naturally low in fat, so avoid spreading on butter or adding cream and cheese sauces.   Vegetables (4 to 5 servings a day)  Tomatoes, carrots, broccoli, sweet potatoes, greens and other vegetables are full of fiber, vitamins, and such minerals as potassium and magnesium. Examples of one serving include 1 cup raw leafy green vegetables or 1/2 cup cut-up raw or cooked vegetables.   Don't think of vegetables only as side dishes -- a hearty blend  of vegetables served over brown rice or whole-wheat noodles can serve as the main dish for a meal.   Fresh or frozen vegetables are both good choices. When buying frozen and canned vegetables, choose those labeled as low sodium or without added salt.   To increase the number of servings you fit in daily, be creative. In a stir-lim, for instance, cut the amount of meat in half and double up on the vegetables.   Fruits (4 to 5 servings a day)  Many fruits need little preparation to become a healthy part of a meal or snack. Like vegetables, they're packed with fiber, potassium and magnesium and are typically low in fat -- exceptions include avocados and coconuts. Examples of one serving include 1 medium fruit or 1/2 cup fresh, frozen or canned fruit.   Have a piece of fruit with meals and one as a snack, then round out your day with a dessert of fresh fruits topped with a splash of low-fat yogurt.   Leave on edible peels whenever possible. The peels of apples, pears and most fruits with pits add interesting texture to recipes and contain healthy nutrients and fiber.   Remember that citrus fruits and juice, such as grapefruit, can interact with certain medications, so check with your doctor or pharmacist to see if they're OK for you.   Dairy (2 to 3 servings a day)  Milk, yogurt, cheese and other dairy products are major sources of calcium, vitamin D and protein. But the key is to make sure that you choose dairy products that are low-fat or fat-free because otherwise they can be a major source of fat. Examples of one serving include 1 cup skim or 1% milk, 1 cup yogurt or 1 1/2 oz. cheese.   Low-fat or fat-free frozen yogurt can help you boost the amount of dairy products you eat while offering a sweet treat. Add fruit for a healthy twist.   If you have trouble digesting dairy products, choose lactose-free products or consider taking an over-the-counter product that contains the enzyme lactase, which can reduce or  prevent the symptoms of lactose intolerance.   Go easy on regular and even fat-free cheeses because they are typically high in sodium.   Lean meat, poultry and fish (6 or fewer servings a day)  Meat can be a rich source of protein, B vitamins, iron and zinc. But because even lean varieties contain fat and cholesterol, don't make them a mainstay of your diet -- cut back typical meat portions by one-third or one-half and pile on the vegetables instead. Examples of one serving include 1 oz. cooked skinless poultry, seafood or lean meat, 1 egg, or 1 oz. water-packed, no-salt-added canned tuna.   Trim away skin and fat from meat and then broil, grill, roast or poach instead of frying.   Eat heart-healthy fish, such as salmon, herring and tuna. These types of fish are high in omega-3 fatty acids, which can help lower your total cholesterol.   Nuts, seeds and legumes (4 to 5 servings a week)   Almonds, sunflower seeds, kidney beans, peas, lentils and other foods in this family are good sources of magnesium, potassium and protein. They're also full of fiber and phytochemicals, which are plant compounds that may protect against some cancers and cardiovascular disease. Serving sizes are small and are intended to be consumed weekly because these foods are high in calories. Examples of one serving include 1/3 cup (1 1/2 oz.) nuts, 2 tablespoons seeds or 1/2 cup cooked beans or peas.   Nuts sometimes get a bad rap because of their fat content, but they contain healthy types of fat -- monounsaturated fat and omega-3 fatty acids. They're high in calories, however, so eat them in moderation. Try adding them to stir-fries, salads or cereals.   Soybean-based products, such as tofu and tempeh, can be a good alternative to meat because they contain all of the amino acids your body needs to make a complete protein, just like meat. They also contain isoflavones, a type of natural plant compound (phytochemical) that has been shown to have  some health benefits.   Fats and oils (2 to 3 servings a day)  Fat helps your body absorb essential vitamins and helps your body's immune system. But too much fat increases your risk of heart disease, diabetes and obesity. The DASH diet strives for a healthy balance by providing 30 percent or less of daily calories from fat, with a focus on the healthier unsaturated fats. Examples of one serving include 1 teaspoon soft margarine, 1 tablespoon low-fat mayonnaise or 2 tablespoons light salad dressing.   Saturated fat and trans fat are the main dietary culprits in raising your blood cholesterol and increasing your risk of coronary artery disease. DASH helps keep your daily saturated fat to less than 10 percent of your total calories by limiting use of meat, butter, cheese, whole milk, cream and eggs in your diet, along with foods made from lard, solid shortenings, and palm and coconut oils.   Avoid trans fat, commonly found in such processed foods as crackers, baked goods and fried items.   Read food labels on margarine and salad dressing so that you can choose those that are lowest in saturated fat and free of trans fat.   Sweets (5 or fewer a week)  You don't have to banish sweets entirely while following the DASH diet -- just go easy on them. Examples of one serving include 1 tablespoon sugar, jelly or jam, 1/2 cup sorbet or 1 cup (8 oz.) lemonade.   When you eat sweets, choose those that are fat-free or low-fat, such as sorbets, fruit ices, jelly beans, hard candy, baljinder crackers or low-fat cookies.   Artificial sweeteners such as aspartame (NutraSweet, Equal) and sucralose (Splenda) may help satisfy your sweet tooth while sparing the sugar. But remember that you still must use them sensibly. It's OK to swap a diet cola for a regular cola, but not in place of a more nutritious beverage such as low-fat milk or even plain water.   Cut back on added sugar, which has no nutritional value but can pack on calories.    DASH diet: Alcohol and caffeine  Drinking too much alcohol can increase blood pressure. The DASH diet recommends that men limit alcohol to two or fewer drinks a day and women one or less.   The DASH diet doesn't address caffeine consumption. The influence of caffeine on blood pressure remains unclear. But caffeine can cause your blood pressure to rise at least temporarily. If you already have high blood pressure or if you think caffeine is affecting your blood pressure, talk to your doctor about your caffeine consumption.   The DASH diet is not designed to promote weight loss, but it can be used as part of an overall weight-loss strategy. The DASH diet is based on a diet of about 2,000 calories a day. If you're trying to lose weight, though, you may want to eat around 1,600 a day. You may need to adjust your serving goals based on your health or individual circumstances -- something your health care team can help you decide.   Tips to cut back on sodium  The foods at the core of the DASH diet are naturally low in sodium. So just by following the DASH diet, you're likely to reduce your sodium intake. You also can cut back on sodium in your diet by:   Using sodium-free spices or flavorings with your food instead of salt   Not adding salt when cooking rice, pasta or hot cereal   Rinsing canned foods to remove some of the sodium   Buying foods labeled \"no salt added,\" \"sodium-free,\" \"low sodium\" or \"very low sodium\"   One teaspoon of table salt has about 2,300 mg of sodium, and 2/3 teaspoon of table salt has about 1,500 mg of sodium. When you read food labels, you may be surprised at just how much sodium some processed foods contain. Even low-fat soups, canned vegetables, ready-to-eat cereals and sliced turkey from the local deli -- all foods you may have considered healthy -- often have lots of sodium.   You may not notice a difference in taste when you choose low-sodium food and beverages. If things seem too bland,  gradually introduce low-sodium foods and cut back on table salt until you reach your sodium goal. That'll give your palate time to adjust. It can take several weeks for your taste buds to get used to less salty foods.      Call [phone] to schedule the appointment.   Please signup for eEvent, which is electronic access to your record if you have not done so.  All your results will post on there.  [email or web address]   You can NOW use eEvent to book your appointments with us, or consider using open access scheduling which is through the [facility] website [email or web address] and type in Cole Garvin MD and follow the links for \"Schedule Online Now\"    To schedule Imaging or tests at [place] call Central Scheduling [phone], Go to [facility] ER Building (For example: CT scans, X rays, Ultrasound, MRI)  Cardiac Testing in ER building Building A second floor Cardiac Testing [phone] (For example: Holter Monitor, Cardiac Stress tests,Event Monitor, or 2D Echocardiograms)  [facility] call [phone] usually in [facility]  [facility] in [place] at [address] Mon-Fri at 8am-7:30 p.m., and Sat/Sun 9:00a.m.-4:30 p.m.  Also at [address] [place] [address]  Call [phone] for info     Please call our office about any questions regarding your treatment/medicines/tests as a result of today's visit.  For your safety, read the entire package insert of all medicines prescribed to you and be aware of all of the risks of treatment even beyond those discussed today.  All therapies have potential risk of harm or side effects or medication interactions.  It is your duty and for your safety to discuss with the pharmacist and our office with questions, and to notify us and stop treatment if problems arise, but know that our intention is that the benefits outweigh those potential risks and we strive to make you healthier and to improve your quality of life.    Referrals, and Radiology  Information:    If your insurance requires a referral to a specialist, please allow 5 business days to process your referral request.    If Cole Garvin MD orders a CT or MRI, it may take up to 10 business days to receive approval from your insurance company. Once our office has called informing you that the insurance company approved your testing, please call Central Scheduling at [phone]  Please allow our office 5 business days to contact you regarding any testing results.    Refill policies:   Allow 3 business days for refills; controlled substances may take longer and must be picked up from the office in person.  Narcotic medications can only be filled in 30 day increments and must be refilled at an office visit only.  If your prescription is due for a refill, you may be due for a follow-up appointment.  We cannot refill your maintenance medications at a preventative wellness visit.  To best provide you care, patients receiving maintenance medications need to be seen at least twice a year.

## 2024-04-24 NOTE — PROGRESS NOTES
Subjective:   Patient ID: Leonie Wood is a 53 year old female.    HPI  Ms. Wood is a pleasant 53-year-old female with history of chronic pain syndrome secondary to lumbar degenerative joint disease, status post right knee arthroscopy for torn meniscus and history of hives and allergic rhinitis here for her follow-up appointment.  Her blood pressure is elevated here and has not been taking metoprolol.  She has been taking her allergy medications which would tend to increase her blood pressure.  She is mainly here overnight to get refills for her pain medications.  She takes Percocet but has not been taking only 1 tablet 3 times a day to help manage pain related to ongoing pain secondary to lumbar arthritis and knee arthritis and meniscus tear.  She also takes Tylenol in between.  She would also need refills for this.  No fever no cough no chest pain no shortness of breath no nausea no vomiting no abdominal pain. I had reviewed past medical and family histories together with allergy and medication lists documented.    History/Other:   Review of Systems  Constitutional: Negative for fatigue and fever.  HENT: Negative for sore throat and trouble swallowing.    Respiratory: Negative for cough.    Gastrointestinal: Negative for abdominal pain, diarrhea, nausea and vomiting.  Musculoskeletal: Positive for arthralgias, back pain  Current Outpatient Medications   Medication Sig Dispense Refill    EPINEPHrine 0.3 MG/0.3ML Injection Solution Auto-injector Inject 0.3 mL (1 each total) as directed one time.      oxyCODONE-acetaminophen  MG Oral Tab Take 1 tablet by mouth every 4 (four) hours as needed for Pain. 90 tablet 0    [START ON 5/24/2024] oxyCODONE-acetaminophen  MG Oral Tab Take 1 tablet by mouth every 4 (four) hours as needed for Pain. 90 tablet 0    [START ON 6/24/2024] oxyCODONE-acetaminophen  MG Oral Tab Take 1 tablet by mouth every 4 (four) hours as needed for Pain. 90 tablet 0     LORazepam 1 MG Oral Tab Take 1 tablet (1 mg total) by mouth 2 (two) times daily as needed for Anxiety. 30 tablet 0    traMADol 50 MG Oral Tab Take 1-2 tablets ( mg total) by mouth every 4 to 6 hours as needed for Pain. May refill today 240 tablet 0    oxyCODONE-acetaminophen  MG Oral Tab Take 1-2 tablets by mouth every 8 (eight) hours as needed for Pain. 90 tablet 0    predniSONE 1 MG Oral Tab TAKE 4 TABLETS BY MOUTH DAILY FOR 7 DAYS, THEN 3 TABLETS DAILY FOR 7 DAYS, THEN 2 TABLETS DAILY FOR 14 DAYS. 90 tablet 0    predniSONE 5 MG Oral Tab Take 1 tablet (5 mg total) by mouth every morning. 90 tablet 0    oxyCODONE-acetaminophen  MG Oral Tab Take 1 tablet by mouth every 8 (eight) hours as needed for Pain. 90 tablet 0    albuterol 108 (90 Base) MCG/ACT Inhalation Aero Soln Inhale 2 puffs into the lungs every 6 (six) hours as needed for Wheezing. 3 each 1    oxyCODONE-acetaminophen  MG Oral Tab Take 1-2 tablets by mouth every 8 (eight) hours as needed for Pain. 90 tablet 0    oxyCODONE-acetaminophen  MG Oral Tab Take 1 tablet by mouth every 8 (eight) hours as needed for Pain. 90 tablet 0    mupirocin 2 % External Ointment Apply 1 Application topically 3 (three) times daily. 15 g 0    predniSONE 10 MG Oral Tab TAKE 1 TABLET (10 MG TOTAL) BY MOUTH 2 (TWO) TIMES DAILY FOR 3 DAYS, THEN 1 TABLET (10 MG TOTAL) DAILY FOR 3 DAYS, THEN 1/2 TABLET (5 MG TOTAL) DAILY FOR 3 DAYS. 11 tablet 0    rosuvastatin 10 MG Oral Tab Take 1 tablet (10 mg total) by mouth nightly. 90 tablet 0    CYCLOBENZAPRINE 10 MG Oral Tab TAKE 1 TABLET BY MOUTH NIGHTLY AS NEEDED FOR MUSCLE SPASMS 30 tablet 5    Ibuprofen 100 MG Oral Chew Tab       predniSONE 50 MG Oral Tab Take 1 tablet (50 mg total) by mouth as needed. For allergy flare-up      aspirin  MG Oral Tab EC TAKE ONE TABLET BY MOUTH ONE TIME DAILY POST OPERATIVELY      Fexofenadine HCl 180 MG Oral Tab Take 1 tablet (180 mg total) by mouth daily.      Naloxone  HCl 4 MG/0.1ML Nasal Liquid 4 mg by Nasal route as needed. If patient remains unresponsive, repeat dose in other nostril 2-5 minutes after first dose. 1 kit 0    famoTIDine 20 MG Oral Tab Take 1 tablet (20 mg total) by mouth 2 (two) times daily.      Ketotifen Fumarate (KETOTIFEN HYDROGEN FUMARATE) Does not apply Powder 2 mg.      psyllium 28 % Oral Powd Pack Take 1 packet by mouth as needed.      cetirizine 10 MG Oral Tab Take 1-2 tablets (10-20 mg total) by mouth daily.      caffeine 200 MG Oral Tab Take 1 tablet (200 mg total) by mouth daily.      metoprolol succinate ER 50 MG Oral Tablet 24 Hr Take 1 tablet (50 mg total) by mouth daily. (Patient not taking: Reported on 1/31/2024) 90 tablet 1    metoprolol succinate ER 25 MG Oral Tablet 24 Hr Take 1 tablet (25 mg total) by mouth daily. (Patient not taking: Reported on 1/31/2024) 30 tablet 1     Allergies:  Allergies   Allergen Reactions    Hydrochlorothiazide SWELLING    Latex ANAPHYLAXIS    Latex ANAPHYLAXIS and RASH    Morphine SWELLING     Legs swelling      Phentermine HIVES and SWELLING    Penicillin G RASH       Objective:   Physical Exam  Vitals reviewed.   Constitutional:       General: She is not in acute distress.  HENT:      Mouth/Throat:      Mouth: Mucous membranes are moist.      Pharynx: Oropharynx is clear.   Eyes:      General: No scleral icterus.     Conjunctiva/sclera: Conjunctivae normal.   Cardiovascular:      Rate and Rhythm: Normal rate and regular rhythm.      Heart sounds: Normal heart sounds. No murmur heard.  Pulmonary:      Effort: Pulmonary effort is normal. No respiratory distress.      Breath sounds: Normal breath sounds. No wheezing or rales.   Abdominal:      General: Bowel sounds are normal.      Palpations: Abdomen is soft.   Musculoskeletal:      Cervical back: Neck supple.      Right lower leg: No edema.   Lymphadenopathy:      Cervical: No cervical adenopathy.   Skin:     General: Skin is warm and dry.   Neurological:       Mental Status: She is alert.   Psychiatric:         Mood and Affect: Mood normal.         Behavior: Behavior normal.         Assessment & Plan:   1. Chronic pain syndrome   chronic stable issue, continue present management with observation and medications as noted     2. Osteoarthritis of lumbar spine, unspecified spinal osteoarthritis complication status   chronic stable issue, continue present management with observation and medications as noted     3. Primary hypertension   -Encouraged her to take her metoprolol and watch her blood pressure if able   4. Anxiety   chronic stable issue, continue present management with observation and medications as noted     5. Tear of medial meniscus of right knee, unspecified tear type, unspecified whether old or current tear, initial encounter   -Stable  - Continue current pain regimen   6. Chronic pain of right knee   chronic stable issue, continue present management with observation and medications as noted     7. Rupture of anterior cruciate ligament of right knee, initial encounter   chronic stable issue, continue present management with observation and medications as noted       Follow-up after 3 months or as needed    This note was prepared using Dragon Medical voice recognition dictation software. As a result errors may occur. When identified these errors have been corrected. While every attempt is made to correct errors during dictation discrepancies may still exist          No orders of the defined types were placed in this encounter.      Meds This Visit:  Requested Prescriptions     Signed Prescriptions Disp Refills    oxyCODONE-acetaminophen  MG Oral Tab 90 tablet 0     Sig: Take 1 tablet by mouth every 4 (four) hours as needed for Pain.    oxyCODONE-acetaminophen  MG Oral Tab 90 tablet 0     Sig: Take 1 tablet by mouth every 4 (four) hours as needed for Pain.    oxyCODONE-acetaminophen  MG Oral Tab 90 tablet 0     Sig: Take 1 tablet by mouth every  4 (four) hours as needed for Pain.    LORazepam 1 MG Oral Tab 30 tablet 0     Sig: Take 1 tablet (1 mg total) by mouth 2 (two) times daily as needed for Anxiety.    traMADol 50 MG Oral Tab 240 tablet 0     Sig: Take 1-2 tablets ( mg total) by mouth every 4 to 6 hours as needed for Pain. May refill today       Imaging & Referrals:  None

## 2024-05-13 ENCOUNTER — TELEMEDICINE (OUTPATIENT)
Dept: FAMILY MEDICINE CLINIC | Facility: CLINIC | Age: 54
End: 2024-05-13
Payer: COMMERCIAL

## 2024-05-13 DIAGNOSIS — M54.50 ACUTE BILATERAL LOW BACK PAIN, UNSPECIFIED WHETHER SCIATICA PRESENT: Primary | ICD-10-CM

## 2024-05-13 NOTE — PROGRESS NOTES
Subjective:   Patient ID: Leonie Wood is a 53 year old female.    HPI  Ms. Wood is a pleasant 54-year-old old female well-known to me and has history of chronic pain secondary to lumbar DJD presenting for video visit today as she had hurt her back yesterday doing yard work.  Pain is moderate to severe intensity and radiates down to her legs associate with numbness.  She had tried taking her Flexeril which made it worse.  No recent injury.  She would just let me know that she will be getting her refill at her current pharmacy for oxycodone which is with effective for her pain management. I had reviewed past medical and family histories together with allergy and medication lists documented.    History/Other:   Review of Systems   Musculoskeletal:  Positive for back pain.   Neurological:  Positive for numbness. Negative for weakness.     Current Outpatient Medications   Medication Sig Dispense Refill    EPINEPHrine 0.3 MG/0.3ML Injection Solution Auto-injector Inject 0.3 mL (1 each total) as directed one time.      oxyCODONE-acetaminophen  MG Oral Tab Take 1 tablet by mouth every 4 (four) hours as needed for Pain. 90 tablet 0    [START ON 5/24/2024] oxyCODONE-acetaminophen  MG Oral Tab Take 1 tablet by mouth every 4 (four) hours as needed for Pain. 90 tablet 0    [START ON 6/24/2024] oxyCODONE-acetaminophen  MG Oral Tab Take 1 tablet by mouth every 4 (four) hours as needed for Pain. 90 tablet 0    LORazepam 1 MG Oral Tab Take 1 tablet (1 mg total) by mouth 2 (two) times daily as needed for Anxiety. 30 tablet 0    traMADol 50 MG Oral Tab Take 1-2 tablets ( mg total) by mouth every 4 to 6 hours as needed for Pain. May refill today 240 tablet 0    oxyCODONE-acetaminophen  MG Oral Tab Take 1-2 tablets by mouth every 8 (eight) hours as needed for Pain. 90 tablet 0    predniSONE 1 MG Oral Tab TAKE 4 TABLETS BY MOUTH DAILY FOR 7 DAYS, THEN 3 TABLETS DAILY FOR 7 DAYS, THEN 2 TABLETS  DAILY FOR 14 DAYS. 90 tablet 0    predniSONE 5 MG Oral Tab Take 1 tablet (5 mg total) by mouth every morning. 90 tablet 0    oxyCODONE-acetaminophen  MG Oral Tab Take 1 tablet by mouth every 8 (eight) hours as needed for Pain. 90 tablet 0    albuterol 108 (90 Base) MCG/ACT Inhalation Aero Soln Inhale 2 puffs into the lungs every 6 (six) hours as needed for Wheezing. 3 each 1    oxyCODONE-acetaminophen  MG Oral Tab Take 1-2 tablets by mouth every 8 (eight) hours as needed for Pain. 90 tablet 0    oxyCODONE-acetaminophen  MG Oral Tab Take 1 tablet by mouth every 8 (eight) hours as needed for Pain. 90 tablet 0    mupirocin 2 % External Ointment Apply 1 Application topically 3 (three) times daily. 15 g 0    predniSONE 10 MG Oral Tab TAKE 1 TABLET (10 MG TOTAL) BY MOUTH 2 (TWO) TIMES DAILY FOR 3 DAYS, THEN 1 TABLET (10 MG TOTAL) DAILY FOR 3 DAYS, THEN 1/2 TABLET (5 MG TOTAL) DAILY FOR 3 DAYS. 11 tablet 0    rosuvastatin 10 MG Oral Tab Take 1 tablet (10 mg total) by mouth nightly. 90 tablet 0    metoprolol succinate ER 50 MG Oral Tablet 24 Hr Take 1 tablet (50 mg total) by mouth daily. (Patient not taking: Reported on 1/31/2024) 90 tablet 1    metoprolol succinate ER 25 MG Oral Tablet 24 Hr Take 1 tablet (25 mg total) by mouth daily. (Patient not taking: Reported on 1/31/2024) 30 tablet 1    CYCLOBENZAPRINE 10 MG Oral Tab TAKE 1 TABLET BY MOUTH NIGHTLY AS NEEDED FOR MUSCLE SPASMS 30 tablet 5    Ibuprofen 100 MG Oral Chew Tab       predniSONE 50 MG Oral Tab Take 1 tablet (50 mg total) by mouth as needed. For allergy flare-up      aspirin  MG Oral Tab EC TAKE ONE TABLET BY MOUTH ONE TIME DAILY POST OPERATIVELY      Fexofenadine HCl 180 MG Oral Tab Take 1 tablet (180 mg total) by mouth daily.      Naloxone HCl 4 MG/0.1ML Nasal Liquid 4 mg by Nasal route as needed. If patient remains unresponsive, repeat dose in other nostril 2-5 minutes after first dose. 1 kit 0    famoTIDine 20 MG Oral Tab Take 1  tablet (20 mg total) by mouth 2 (two) times daily.      Ketotifen Fumarate (KETOTIFEN HYDROGEN FUMARATE) Does not apply Powder 2 mg.      psyllium 28 % Oral Powd Pack Take 1 packet by mouth as needed.      cetirizine 10 MG Oral Tab Take 1-2 tablets (10-20 mg total) by mouth daily.      caffeine 200 MG Oral Tab Take 1 tablet (200 mg total) by mouth daily.       Allergies:  Allergies   Allergen Reactions    Hydrochlorothiazide SWELLING    Latex ANAPHYLAXIS    Latex ANAPHYLAXIS and RASH    Morphine SWELLING     Legs swelling      Phentermine HIVES and SWELLING    Penicillin G RASH       Objective:   Physical Exam  Constitutional:       General: She is not in acute distress.  Neurological:      Mental Status: She is alert.   Psychiatric:         Mood and Affect: Mood normal.         Assessment & Plan:   1. Acute bilateral low back pain, unspecified whether sciatica present    -May apply heat or ice whichever is effective  - May go ahead and take your pain medication regimen as needed  - Rest at this point  - Call or come us if symptoms worsen or persist    This note was prepared using Dragon Medical voice recognition dictation software. As a result errors may occur. When identified these errors have been corrected. While every attempt is made to correct errors during dictation discrepancies may still exist            No orders of the defined types were placed in this encounter.      Meds This Visit:  Requested Prescriptions      No prescriptions requested or ordered in this encounter       Imaging & Referrals:  None

## 2024-05-20 ENCOUNTER — TELEPHONE (OUTPATIENT)
Dept: FAMILY MEDICINE CLINIC | Facility: CLINIC | Age: 54
End: 2024-05-20

## 2024-05-20 DIAGNOSIS — S83.511A RUPTURE OF ANTERIOR CRUCIATE LIGAMENT OF RIGHT KNEE, INITIAL ENCOUNTER: ICD-10-CM

## 2024-05-20 DIAGNOSIS — M47.816 OSTEOARTHRITIS OF LUMBAR SPINE, UNSPECIFIED SPINAL OSTEOARTHRITIS COMPLICATION STATUS: ICD-10-CM

## 2024-05-20 DIAGNOSIS — G89.4 CHRONIC PAIN SYNDROME: ICD-10-CM

## 2024-05-20 DIAGNOSIS — S83.241A TEAR OF MEDIAL MENISCUS OF RIGHT KNEE, UNSPECIFIED TEAR TYPE, UNSPECIFIED WHETHER OLD OR CURRENT TEAR, INITIAL ENCOUNTER: ICD-10-CM

## 2024-05-20 DIAGNOSIS — G89.29 CHRONIC PAIN OF RIGHT KNEE: ICD-10-CM

## 2024-05-20 DIAGNOSIS — M25.561 CHRONIC PAIN OF RIGHT KNEE: ICD-10-CM

## 2024-05-20 RX ORDER — TRAMADOL HYDROCHLORIDE 50 MG/1
TABLET ORAL
Qty: 240 TABLET | Refills: 0 | Status: SHIPPED | OUTPATIENT
Start: 2024-05-20

## 2024-05-20 NOTE — TELEPHONE ENCOUNTER
Requesting Tramadol 50mg  Last OV: 5/13/24 Telemedicine  RTC: prn  Last Rx'd 4/24/24 #240 with 0 refills    No future appointments.    Per [place] , last dispensed 4/24/24 #240    Controlled med:  Rx pended and routed for approval/denial

## 2024-05-20 NOTE — TELEPHONE ENCOUNTER
For the second month are you authorizing an early fill on her pain medication and why? Need a call back.

## 2024-05-28 ENCOUNTER — TELEPHONE (OUTPATIENT)
Dept: FAMILY MEDICINE CLINIC | Facility: CLINIC | Age: 54
End: 2024-05-28

## 2024-05-28 NOTE — TELEPHONE ENCOUNTER
Patient called and said her pharmacy needs a call from pur office stating it is ok for them to fill her prescription for the oxyCODONE-acetaminophen  MG Oral Tab.  [facility]/PHARMACY #[id number] - [place], [place] - [address] AT [address], [phone], [phone]

## 2024-05-30 RX ORDER — FLUCONAZOLE 150 MG/1
150 TABLET ORAL ONCE
Qty: 1 TABLET | Refills: 3 | Status: SHIPPED | OUTPATIENT
Start: 2024-05-30 | End: 2024-05-30

## 2024-05-30 RX ORDER — PREDNISONE 1 MG/1
TABLET ORAL
Qty: 90 TABLET | Refills: 0 | Status: SHIPPED | OUTPATIENT
Start: 2024-05-30

## 2024-06-10 ENCOUNTER — TELEMEDICINE (OUTPATIENT)
Dept: FAMILY MEDICINE CLINIC | Facility: CLINIC | Age: 54
End: 2024-06-10
Payer: COMMERCIAL

## 2024-06-10 DIAGNOSIS — S83.241A TEAR OF MEDIAL MENISCUS OF RIGHT KNEE, UNSPECIFIED TEAR TYPE, UNSPECIFIED WHETHER OLD OR CURRENT TEAR, INITIAL ENCOUNTER: ICD-10-CM

## 2024-06-10 DIAGNOSIS — G89.29 CHRONIC BILATERAL LOW BACK PAIN, UNSPECIFIED WHETHER SCIATICA PRESENT: Primary | ICD-10-CM

## 2024-06-10 DIAGNOSIS — G89.4 CHRONIC PAIN SYNDROME: ICD-10-CM

## 2024-06-10 DIAGNOSIS — M25.551 RIGHT HIP PAIN: ICD-10-CM

## 2024-06-10 DIAGNOSIS — G89.29 CHRONIC PAIN OF RIGHT KNEE: ICD-10-CM

## 2024-06-10 DIAGNOSIS — M25.561 CHRONIC PAIN OF RIGHT KNEE: ICD-10-CM

## 2024-06-10 DIAGNOSIS — M47.816 OSTEOARTHRITIS OF LUMBAR SPINE, UNSPECIFIED SPINAL OSTEOARTHRITIS COMPLICATION STATUS: ICD-10-CM

## 2024-06-10 DIAGNOSIS — M54.50 CHRONIC BILATERAL LOW BACK PAIN, UNSPECIFIED WHETHER SCIATICA PRESENT: Primary | ICD-10-CM

## 2024-06-10 DIAGNOSIS — I10 PRIMARY HYPERTENSION: ICD-10-CM

## 2024-06-10 DIAGNOSIS — S83.511A RUPTURE OF ANTERIOR CRUCIATE LIGAMENT OF RIGHT KNEE, INITIAL ENCOUNTER: ICD-10-CM

## 2024-06-10 RX ORDER — OXYCODONE AND ACETAMINOPHEN 10; 325 MG/1; MG/1
1-2 TABLET ORAL EVERY 4 HOURS PRN
Qty: 90 TABLET | Refills: 0 | Status: SHIPPED | OUTPATIENT
Start: 2024-06-10

## 2024-06-10 RX ORDER — METOPROLOL SUCCINATE 50 MG/1
50 TABLET, EXTENDED RELEASE ORAL DAILY
Qty: 90 TABLET | Refills: 1 | Status: SHIPPED | OUTPATIENT
Start: 2024-06-10

## 2024-06-10 NOTE — PROGRESS NOTES
Subjective:   Patient ID: Leonie Wood is a 53 year old female.    HPI  Ms. Wood is a very pleasant 53-year-old female with multiple medical conditions which include but not limited to chronic pain secondary to osteoarthritis of the lumbar spine, lumbar DJD, history of rectocele status post repair, angioedema and hives requiring prednisone, hypertension presenting today for video visit.  She has been having more than severe low back pain and right hip pain.  No recent injury or trauma.  She almost went to the emergency room but waited to do this visit.  She is requesting to do MRI of the lumbar spine and right hip.  No numbness or tingling.  She has been having difficulty ambulating because of the pain.  She is requesting her Percocet to be refilled which has been helpful with her pain.  She notes that her blood pressure has been elevated.  Blood pressure today is 189/100 but recognizes that she is in pain.  She actually started taking metoprolol 25 mg ER daily. I had reviewed past medical and family histories together with allergy and medication lists documented.    History/Other:   Review of Systems   Constitutional:  Negative for fatigue and fever.   Respiratory:  Negative for cough and shortness of breath.    Cardiovascular:  Negative for chest pain.   Gastrointestinal:  Negative for abdominal pain, diarrhea, nausea and vomiting.   Musculoskeletal:  Positive for arthralgias and back pain.   Neurological:  Positive for weakness. Negative for dizziness and numbness.     Current Outpatient Medications   Medication Sig Dispense Refill    metoprolol succinate ER 50 MG Oral Tablet 24 Hr Take 1 tablet (50 mg total) by mouth daily. 90 tablet 1    oxyCODONE-acetaminophen  MG Oral Tab Take 1-2 tablets by mouth every 4 (four) hours as needed for Pain. Ok to fill today 90 tablet 0    PREDNISONE 1 MG Oral Tab TAKE 4 TABLETS BY MOUTH DAILY FOR 7 DAYS, THEN 3 TABLETS DAILY FOR 7 DAYS, THEN 2 TABLETS DAILY FOR  14 DAYS. 90 tablet 0    TRAMADOL 50 MG Oral Tab TAKE 1-2 TABLETS ( MG TOTAL) BY MOUTH EVERY 4 TO 6 HOURS AS NEEDED FOR PAIN. MAY REFILL TODAY 240 tablet 0    EPINEPHrine 0.3 MG/0.3ML Injection Solution Auto-injector Inject 0.3 mL (1 each total) as directed one time.      oxyCODONE-acetaminophen  MG Oral Tab Take 1 tablet by mouth every 4 (four) hours as needed for Pain. 90 tablet 0    [START ON 6/24/2024] oxyCODONE-acetaminophen  MG Oral Tab Take 1 tablet by mouth every 4 (four) hours as needed for Pain. 90 tablet 0    LORazepam 1 MG Oral Tab Take 1 tablet (1 mg total) by mouth 2 (two) times daily as needed for Anxiety. 30 tablet 0    oxyCODONE-acetaminophen  MG Oral Tab Take 1-2 tablets by mouth every 8 (eight) hours as needed for Pain. 90 tablet 0    predniSONE 5 MG Oral Tab Take 1 tablet (5 mg total) by mouth every morning. 90 tablet 0    oxyCODONE-acetaminophen  MG Oral Tab Take 1 tablet by mouth every 8 (eight) hours as needed for Pain. 90 tablet 0    albuterol 108 (90 Base) MCG/ACT Inhalation Aero Soln Inhale 2 puffs into the lungs every 6 (six) hours as needed for Wheezing. 3 each 1    oxyCODONE-acetaminophen  MG Oral Tab Take 1-2 tablets by mouth every 8 (eight) hours as needed for Pain. 90 tablet 0    oxyCODONE-acetaminophen  MG Oral Tab Take 1 tablet by mouth every 8 (eight) hours as needed for Pain. 90 tablet 0    mupirocin 2 % External Ointment Apply 1 Application topically 3 (three) times daily. 15 g 0    predniSONE 10 MG Oral Tab TAKE 1 TABLET (10 MG TOTAL) BY MOUTH 2 (TWO) TIMES DAILY FOR 3 DAYS, THEN 1 TABLET (10 MG TOTAL) DAILY FOR 3 DAYS, THEN 1/2 TABLET (5 MG TOTAL) DAILY FOR 3 DAYS. 11 tablet 0    rosuvastatin 10 MG Oral Tab Take 1 tablet (10 mg total) by mouth nightly. 90 tablet 0    metoprolol succinate ER 25 MG Oral Tablet 24 Hr Take 1 tablet (25 mg total) by mouth daily. (Patient not taking: Reported on 1/31/2024) 30 tablet 1    CYCLOBENZAPRINE 10 MG  Oral Tab TAKE 1 TABLET BY MOUTH NIGHTLY AS NEEDED FOR MUSCLE SPASMS 30 tablet 5    Ibuprofen 100 MG Oral Chew Tab       predniSONE 50 MG Oral Tab Take 1 tablet (50 mg total) by mouth as needed. For allergy flare-up      aspirin  MG Oral Tab EC TAKE ONE TABLET BY MOUTH ONE TIME DAILY POST OPERATIVELY      Fexofenadine HCl 180 MG Oral Tab Take 1 tablet (180 mg total) by mouth daily.      Naloxone HCl 4 MG/0.1ML Nasal Liquid 4 mg by Nasal route as needed. If patient remains unresponsive, repeat dose in other nostril 2-5 minutes after first dose. 1 kit 0    famoTIDine 20 MG Oral Tab Take 1 tablet (20 mg total) by mouth 2 (two) times daily.      Ketotifen Fumarate (KETOTIFEN HYDROGEN FUMARATE) Does not apply Powder 2 mg.      psyllium 28 % Oral Powd Pack Take 1 packet by mouth as needed.      cetirizine 10 MG Oral Tab Take 1-2 tablets (10-20 mg total) by mouth daily.      caffeine 200 MG Oral Tab Take 1 tablet (200 mg total) by mouth daily.       Allergies:  Allergies   Allergen Reactions    Hydrochlorothiazide SWELLING    Latex ANAPHYLAXIS    Latex ANAPHYLAXIS and RASH    Morphine SWELLING     Legs swelling      Phentermine HIVES and SWELLING    Penicillin G RASH       Objective:   Physical Exam  Constitutional:       General: She is not in acute distress.  Neurological:      Mental Status: She is alert.   Psychiatric:         Mood and Affect: Mood normal.         Behavior: Behavior normal.         Thought Content: Thought content normal.         Assessment & Plan:   1. Chronic bilateral low back pain, unspecified whether sciatica present    2. Chronic pain syndrome    3. Osteoarthritis of lumbar spine, unspecified spinal osteoarthritis complication status    4. Tear of medial meniscus of right knee, unspecified tear type, unspecified whether old or current tear, initial encounter    5. Chronic pain of right knee    6. Rupture of anterior cruciate ligament of right knee, initial encounter    7. Right hip pain     8. Primary hypertension    -Continue with current pain regimen which was refilled today and sent to her corresponding pharmacy  - I did ask her to take metoprolol 50 mg extended release daily and monitor blood pressure but go to the emergency room if this would be persistently high  - Go to the emergency room if she develops respiratory distress and if pain worsens and persist  - Will order MRI of the lumbar spine and right hip to check for disc pathology.      This note was prepared using Dragon Medical voice recognition dictation software. As a result errors may occur. When identified these errors have been corrected. While every attempt is made to correct errors during dictation discrepancies may still exist            No orders of the defined types were placed in this encounter.      Meds This Visit:  Requested Prescriptions     Signed Prescriptions Disp Refills    metoprolol succinate ER 50 MG Oral Tablet 24 Hr 90 tablet 1     Sig: Take 1 tablet (50 mg total) by mouth daily.    oxyCODONE-acetaminophen  MG Oral Tab 90 tablet 0     Sig: Take 1-2 tablets by mouth every 4 (four) hours as needed for Pain. Ok to fill today       Imaging & Referrals:  MRI SPINE LUMBAR (CPT=72148)  MRI HIPS, RIGHT (CPT=73721)

## 2024-06-17 DIAGNOSIS — M25.561 CHRONIC PAIN OF RIGHT KNEE: ICD-10-CM

## 2024-06-17 DIAGNOSIS — G89.29 CHRONIC PAIN OF RIGHT KNEE: ICD-10-CM

## 2024-06-17 DIAGNOSIS — S83.511A RUPTURE OF ANTERIOR CRUCIATE LIGAMENT OF RIGHT KNEE, INITIAL ENCOUNTER: ICD-10-CM

## 2024-06-17 DIAGNOSIS — G89.4 CHRONIC PAIN SYNDROME: ICD-10-CM

## 2024-06-17 DIAGNOSIS — S83.241A TEAR OF MEDIAL MENISCUS OF RIGHT KNEE, UNSPECIFIED TEAR TYPE, UNSPECIFIED WHETHER OLD OR CURRENT TEAR, INITIAL ENCOUNTER: ICD-10-CM

## 2024-06-17 DIAGNOSIS — M47.816 OSTEOARTHRITIS OF LUMBAR SPINE, UNSPECIFIED SPINAL OSTEOARTHRITIS COMPLICATION STATUS: ICD-10-CM

## 2024-06-17 RX ORDER — TRAMADOL HYDROCHLORIDE 50 MG/1
TABLET ORAL
Qty: 240 TABLET | Refills: 0 | Status: SHIPPED | OUTPATIENT
Start: 2024-06-17

## 2024-06-24 DIAGNOSIS — S83.511A RUPTURE OF ANTERIOR CRUCIATE LIGAMENT OF RIGHT KNEE, INITIAL ENCOUNTER: ICD-10-CM

## 2024-06-24 DIAGNOSIS — M25.561 CHRONIC PAIN OF RIGHT KNEE: ICD-10-CM

## 2024-06-24 DIAGNOSIS — G89.4 CHRONIC PAIN SYNDROME: ICD-10-CM

## 2024-06-24 DIAGNOSIS — G89.29 CHRONIC PAIN OF RIGHT KNEE: ICD-10-CM

## 2024-06-24 DIAGNOSIS — S83.241A TEAR OF MEDIAL MENISCUS OF RIGHT KNEE, UNSPECIFIED TEAR TYPE, UNSPECIFIED WHETHER OLD OR CURRENT TEAR, INITIAL ENCOUNTER: ICD-10-CM

## 2024-06-24 DIAGNOSIS — M47.816 OSTEOARTHRITIS OF LUMBAR SPINE, UNSPECIFIED SPINAL OSTEOARTHRITIS COMPLICATION STATUS: ICD-10-CM

## 2024-06-24 RX ORDER — OXYCODONE AND ACETAMINOPHEN 10; 325 MG/1; MG/1
1 TABLET ORAL EVERY 4 HOURS PRN
Qty: 60 TABLET | Refills: 0 | Status: SHIPPED | OUTPATIENT
Start: 2024-06-24 | End: 2024-07-09

## 2024-06-24 NOTE — TELEPHONE ENCOUNTER
Patient calling in a refill for oxyCODONE-acetaminophen  MG Oral Tab  stating that she is out of this rx.           [facility]/pharmacy #[id number] - [place], [place] - [address] AT [address] [facility], [phone], [phone] [address] [place] [address]  Phone: [phone] Fax: [phone]

## 2024-06-24 NOTE — TELEPHONE ENCOUNTER
Requesting oxyCODONE-acetaminophen  MG   Last OV: 6/10/24 Telemedicine  RTC: not noted  Last Rx'd 6/10/24 #90 with 0 refills    No future appointments.    Per [place] , last dispensed 6/10/24 #90 (15 day supply)    Controlled med:  Rx pended and routed for approval/denial

## 2024-07-02 DIAGNOSIS — S83.511A RUPTURE OF ANTERIOR CRUCIATE LIGAMENT OF RIGHT KNEE, INITIAL ENCOUNTER: ICD-10-CM

## 2024-07-02 DIAGNOSIS — M25.561 CHRONIC PAIN OF RIGHT KNEE: ICD-10-CM

## 2024-07-02 DIAGNOSIS — G89.29 CHRONIC PAIN OF RIGHT KNEE: ICD-10-CM

## 2024-07-02 DIAGNOSIS — S83.241A TEAR OF MEDIAL MENISCUS OF RIGHT KNEE, UNSPECIFIED TEAR TYPE, UNSPECIFIED WHETHER OLD OR CURRENT TEAR, INITIAL ENCOUNTER: ICD-10-CM

## 2024-07-02 DIAGNOSIS — M47.816 OSTEOARTHRITIS OF LUMBAR SPINE, UNSPECIFIED SPINAL OSTEOARTHRITIS COMPLICATION STATUS: ICD-10-CM

## 2024-07-02 DIAGNOSIS — G89.4 CHRONIC PAIN SYNDROME: ICD-10-CM

## 2024-07-02 RX ORDER — OXYCODONE AND ACETAMINOPHEN 10; 325 MG/1; MG/1
1 TABLET ORAL EVERY 4 HOURS PRN
Qty: 60 TABLET | Refills: 0 | Status: SHIPPED | OUTPATIENT
Start: 2024-07-02 | End: 2024-07-17

## 2024-07-02 NOTE — TELEPHONE ENCOUNTER
Patient calling in a refill request for oxyCODONE-acetaminophen  MG Oral Tab.     Patient states that she is not due to  medication until 7/4 but states that the offices will be closed.     Please advise.           [facility]/pharmacy #[id number] - [place], [place] - [address] AT [address], [phone], [phone] [address] [place] [address]  Phone: [phone] Fax: [phone]

## 2024-07-02 NOTE — TELEPHONE ENCOUNTER
Requesting Oxycodone 10/325mg  Last OV: 6/10/24 Telemedicine  RTC: not noted  Last Rx'd 6/24/24 #60 with 0 refills    Future Appointments   Date Time Provider Department Center   7/12/2024  9:30 AM Cole Garvin MD [facility] [address] [facility] [address]       Per [place] , last dispensed 6/24/24 #60 (10 day supply)    Controlled med:  Rx pended and routed for approval/denial

## 2024-07-09 ENCOUNTER — TELEPHONE (OUTPATIENT)
Dept: FAMILY MEDICINE CLINIC | Facility: CLINIC | Age: 54
End: 2024-07-09

## 2024-07-09 NOTE — TELEPHONE ENCOUNTER
Per Dr. Garvin: MRI shows bursitis with right gluteus insertional tendonitis with partial tear. Refer to ortho.     MCM sent

## 2024-07-09 NOTE — TELEPHONE ENCOUNTER
Wants to know if we received her MRI results from [place], so she can go over it with the provider on Friday.     Future Appointments   Date Time Provider Department Center   7/12/2024  9:30 AM Cole Garvin MD [facility] [address] [facility] [address]

## 2024-07-10 ENCOUNTER — TELEMEDICINE (OUTPATIENT)
Dept: FAMILY MEDICINE CLINIC | Facility: CLINIC | Age: 54
End: 2024-07-10
Payer: COMMERCIAL

## 2024-07-10 DIAGNOSIS — G89.4 CHRONIC PAIN SYNDROME: ICD-10-CM

## 2024-07-10 DIAGNOSIS — M25.561 CHRONIC PAIN OF RIGHT KNEE: ICD-10-CM

## 2024-07-10 DIAGNOSIS — M47.816 OSTEOARTHRITIS OF LUMBAR SPINE, UNSPECIFIED SPINAL OSTEOARTHRITIS COMPLICATION STATUS: ICD-10-CM

## 2024-07-10 DIAGNOSIS — S83.241A TEAR OF MEDIAL MENISCUS OF RIGHT KNEE, UNSPECIFIED TEAR TYPE, UNSPECIFIED WHETHER OLD OR CURRENT TEAR, INITIAL ENCOUNTER: ICD-10-CM

## 2024-07-10 DIAGNOSIS — M70.61 TROCHANTERIC BURSITIS OF RIGHT HIP: Primary | ICD-10-CM

## 2024-07-10 DIAGNOSIS — G89.29 CHRONIC PAIN OF RIGHT KNEE: ICD-10-CM

## 2024-07-10 DIAGNOSIS — S83.511A RUPTURE OF ANTERIOR CRUCIATE LIGAMENT OF RIGHT KNEE, INITIAL ENCOUNTER: ICD-10-CM

## 2024-07-10 PROCEDURE — 99213 OFFICE O/P EST LOW 20 MIN: CPT | Performed by: FAMILY MEDICINE

## 2024-07-10 RX ORDER — OXYCODONE AND ACETAMINOPHEN 10; 325 MG/1; MG/1
1-2 TABLET ORAL EVERY 4 HOURS PRN
Qty: 90 TABLET | Refills: 0 | Status: SHIPPED | OUTPATIENT
Start: 2024-07-10

## 2024-07-10 NOTE — PROGRESS NOTES
Subjective:   Patient ID: Leonie Wood is a 53 year old female.    HPI  Ms. Wood is a pleasant 54 y/o F with multiple medical conditions presenting for video visit today for follow-up for results regarding her recent right hip MRI.  She has been experiencing pain for the past several months on the right hip.  She is unable to walk.  Pain is constant and moderate to severe intensity.  MRI report shows mild degenerative arthropathic changes of the right hip with minimal right hip joint effusion.  Right greater trochanteric bursitis.  Right gluteus medius insertional tendinitis with partial tear.  She reports that she has been taking more of her pain medications and will need this to be refilled.  She is open to seeing orthopedist which I would recommend. I had reviewed past medical and family histories together with allergy and medication lists documented.    History/Other:   Review of Systems   Musculoskeletal:  Positive for arthralgias and back pain.     Current Outpatient Medications   Medication Sig Dispense Refill    oxyCODONE-acetaminophen  MG Oral Tab Take 1-2 tablets by mouth every 4 (four) hours as needed for Pain. Ok to fill today 90 tablet 0    TRAMADOL 50 MG Oral Tab TAKE 1-2 TABLETS ( MG TOTAL) BY MOUTH EVERY 4 TO 6 HOURS AS NEEDED FOR PAIN. 240 tablet 0    metoprolol succinate ER 50 MG Oral Tablet 24 Hr Take 1 tablet (50 mg total) by mouth daily. 90 tablet 1    PREDNISONE 1 MG Oral Tab TAKE 4 TABLETS BY MOUTH DAILY FOR 7 DAYS, THEN 3 TABLETS DAILY FOR 7 DAYS, THEN 2 TABLETS DAILY FOR 14 DAYS. 90 tablet 0    EPINEPHrine 0.3 MG/0.3ML Injection Solution Auto-injector Inject 0.3 mL (1 each total) as directed one time.      LORazepam 1 MG Oral Tab Take 1 tablet (1 mg total) by mouth 2 (two) times daily as needed for Anxiety. 30 tablet 0    predniSONE 5 MG Oral Tab Take 1 tablet (5 mg total) by mouth every morning. 90 tablet 0    albuterol 108 (90 Base) MCG/ACT Inhalation Aero Soln  Inhale 2 puffs into the lungs every 6 (six) hours as needed for Wheezing. 3 each 1    mupirocin 2 % External Ointment Apply 1 Application topically 3 (three) times daily. 15 g 0    predniSONE 10 MG Oral Tab TAKE 1 TABLET (10 MG TOTAL) BY MOUTH 2 (TWO) TIMES DAILY FOR 3 DAYS, THEN 1 TABLET (10 MG TOTAL) DAILY FOR 3 DAYS, THEN 1/2 TABLET (5 MG TOTAL) DAILY FOR 3 DAYS. 11 tablet 0    rosuvastatin 10 MG Oral Tab Take 1 tablet (10 mg total) by mouth nightly. 90 tablet 0    metoprolol succinate ER 25 MG Oral Tablet 24 Hr Take 1 tablet (25 mg total) by mouth daily. (Patient not taking: Reported on 1/31/2024) 30 tablet 1    CYCLOBENZAPRINE 10 MG Oral Tab TAKE 1 TABLET BY MOUTH NIGHTLY AS NEEDED FOR MUSCLE SPASMS 30 tablet 5    Ibuprofen 100 MG Oral Chew Tab       predniSONE 50 MG Oral Tab Take 1 tablet (50 mg total) by mouth as needed. For allergy flare-up      aspirin  MG Oral Tab EC TAKE ONE TABLET BY MOUTH ONE TIME DAILY POST OPERATIVELY      Fexofenadine HCl 180 MG Oral Tab Take 1 tablet (180 mg total) by mouth daily.      Naloxone HCl 4 MG/0.1ML Nasal Liquid 4 mg by Nasal route as needed. If patient remains unresponsive, repeat dose in other nostril 2-5 minutes after first dose. 1 kit 0    famoTIDine 20 MG Oral Tab Take 1 tablet (20 mg total) by mouth 2 (two) times daily.      Ketotifen Fumarate (KETOTIFEN HYDROGEN FUMARATE) Does not apply Powder 2 mg.      psyllium 28 % Oral Powd Pack Take 1 packet by mouth as needed.      cetirizine 10 MG Oral Tab Take 1-2 tablets (10-20 mg total) by mouth daily.      caffeine 200 MG Oral Tab Take 1 tablet (200 mg total) by mouth daily.       Allergies:  Allergies   Allergen Reactions    Hydrochlorothiazide SWELLING    Latex ANAPHYLAXIS    Latex ANAPHYLAXIS and RASH    Morphine SWELLING     Legs swelling      Phentermine HIVES and SWELLING    Penicillin G RASH       Objective:   Physical Exam  Constitutional:       General: She is not in acute distress.  Neurological:       Mental Status: She is alert.         Assessment & Plan:   1. Trochanteric bursitis of right hip    2. Chronic pain syndrome    3. Osteoarthritis of lumbar spine, unspecified spinal osteoarthritis complication status    4. Tear of medial meniscus of right knee, unspecified tear type, unspecified whether old or current tear, initial encounter    5. Chronic pain of right knee    6. Rupture of anterior cruciate ligament of right knee, initial encounter        Will refill pain medication for now and was sent to her corresponding pharmacy today.  Discussed with her MRI report  - Will refer to orthopedics who specializes on the hip in [place] where she resides  - She does have an appointment with me this coming Friday and will reassess    This note was prepared using Dragon Medical voice recognition dictation software. As a result errors may occur. When identified these errors have been corrected. While every attempt is made to correct errors during dictation discrepancies may still exist            No orders of the defined types were placed in this encounter.      Meds This Visit:  Requested Prescriptions     Signed Prescriptions Disp Refills    oxyCODONE-acetaminophen  MG Oral Tab 90 tablet 0     Sig: Take 1-2 tablets by mouth every 4 (four) hours as needed for Pain. Ok to fill today       Imaging & Referrals:  None

## 2024-07-17 ENCOUNTER — TELEPHONE (OUTPATIENT)
Dept: FAMILY MEDICINE CLINIC | Facility: CLINIC | Age: 54
End: 2024-07-17

## 2024-07-17 DIAGNOSIS — M47.816 OSTEOARTHRITIS OF LUMBAR SPINE, UNSPECIFIED SPINAL OSTEOARTHRITIS COMPLICATION STATUS: ICD-10-CM

## 2024-07-17 DIAGNOSIS — S83.511A RUPTURE OF ANTERIOR CRUCIATE LIGAMENT OF RIGHT KNEE, INITIAL ENCOUNTER: ICD-10-CM

## 2024-07-17 DIAGNOSIS — G89.29 CHRONIC PAIN OF RIGHT KNEE: ICD-10-CM

## 2024-07-17 DIAGNOSIS — M25.561 CHRONIC PAIN OF RIGHT KNEE: ICD-10-CM

## 2024-07-17 DIAGNOSIS — G89.4 CHRONIC PAIN SYNDROME: ICD-10-CM

## 2024-07-17 DIAGNOSIS — S83.241A TEAR OF MEDIAL MENISCUS OF RIGHT KNEE, UNSPECIFIED TEAR TYPE, UNSPECIFIED WHETHER OLD OR CURRENT TEAR, INITIAL ENCOUNTER: ICD-10-CM

## 2024-07-17 RX ORDER — TRAMADOL HYDROCHLORIDE 50 MG/1
TABLET ORAL
Qty: 240 TABLET | Refills: 0 | Status: SHIPPED | OUTPATIENT
Start: 2024-07-17

## 2024-07-17 NOTE — TELEPHONE ENCOUNTER
Received incoming fax from pharmacy on Tramadol, needs PA. Will place fax in MA folder Key: [id number]

## 2024-07-18 ENCOUNTER — TELEPHONE (OUTPATIENT)
Dept: FAMILY MEDICINE CLINIC | Facility: CLINIC | Age: 54
End: 2024-07-18

## 2024-07-18 NOTE — TELEPHONE ENCOUNTER
Prior authorization approved  Payer: ANNIKA Azevedo Case ID: [id number]    [phone]    [phone]  Note from payer: Your PA request has been approved.  Additional information will be provided in the approval communication. (Message 1048)  Approval Details    Authorized from July 17, 2024 to January 17, 2025  Electronic appeal: Not supported

## 2024-07-25 ENCOUNTER — TELEPHONE (OUTPATIENT)
Dept: FAMILY MEDICINE CLINIC | Facility: CLINIC | Age: 54
End: 2024-07-25

## 2024-07-25 DIAGNOSIS — M47.816 OSTEOARTHRITIS OF LUMBAR SPINE, UNSPECIFIED SPINAL OSTEOARTHRITIS COMPLICATION STATUS: ICD-10-CM

## 2024-07-25 DIAGNOSIS — M70.61 TROCHANTERIC BURSITIS OF RIGHT HIP: ICD-10-CM

## 2024-07-25 DIAGNOSIS — G89.4 CHRONIC PAIN SYNDROME: ICD-10-CM

## 2024-07-25 DIAGNOSIS — G89.29 CHRONIC PAIN OF RIGHT KNEE: ICD-10-CM

## 2024-07-25 DIAGNOSIS — S83.241A TEAR OF MEDIAL MENISCUS OF RIGHT KNEE, UNSPECIFIED TEAR TYPE, UNSPECIFIED WHETHER OLD OR CURRENT TEAR, INITIAL ENCOUNTER: ICD-10-CM

## 2024-07-25 DIAGNOSIS — M25.561 CHRONIC PAIN OF RIGHT KNEE: ICD-10-CM

## 2024-07-25 DIAGNOSIS — S83.511A RUPTURE OF ANTERIOR CRUCIATE LIGAMENT OF RIGHT KNEE, INITIAL ENCOUNTER: ICD-10-CM

## 2024-07-25 RX ORDER — OXYCODONE AND ACETAMINOPHEN 10; 325 MG/1; MG/1
1-2 TABLET ORAL EVERY 4 HOURS PRN
Qty: 90 TABLET | Refills: 0 | Status: SHIPPED | OUTPATIENT
Start: 2024-07-25

## 2024-07-25 NOTE — TELEPHONE ENCOUNTER
Patient is requesting a refill and has scheduled an in office visit.  Patient states she is starting to walk better and feels she only needs to take 1 every four hours.    Future Appointments   Date Time Provider Department Center   8/9/2024 10:00 AM Cole Garvin MD [facility] [address]        [facility]/PHARMACY #[id number] - [address] AT [facility], [phone], [phone]

## 2024-07-29 ENCOUNTER — TELEPHONE (OUTPATIENT)
Dept: FAMILY MEDICINE CLINIC | Facility: CLINIC | Age: 54
End: 2024-07-29

## 2024-07-29 NOTE — TELEPHONE ENCOUNTER
Forms received via FAX from Leonie Wood for Dr. Garvin. No RELEASE OF INFORMATION signed, no fee collected.

## 2024-08-01 ENCOUNTER — MED REC SCAN ONLY (OUTPATIENT)
Dept: FAMILY MEDICINE CLINIC | Facility: CLINIC | Age: 54
End: 2024-08-01

## 2024-08-02 NOTE — TELEPHONE ENCOUNTER
Family Medical Leave Act forms received with valid [place] Authorization signed on 7/17/24. Logged for processing.

## 2024-08-07 DIAGNOSIS — G89.29 CHRONIC PAIN OF RIGHT KNEE: ICD-10-CM

## 2024-08-07 DIAGNOSIS — G89.4 CHRONIC PAIN SYNDROME: ICD-10-CM

## 2024-08-07 DIAGNOSIS — S83.511A RUPTURE OF ANTERIOR CRUCIATE LIGAMENT OF RIGHT KNEE, INITIAL ENCOUNTER: ICD-10-CM

## 2024-08-07 DIAGNOSIS — S83.241A TEAR OF MEDIAL MENISCUS OF RIGHT KNEE, UNSPECIFIED TEAR TYPE, UNSPECIFIED WHETHER OLD OR CURRENT TEAR, INITIAL ENCOUNTER: ICD-10-CM

## 2024-08-07 DIAGNOSIS — M70.61 TROCHANTERIC BURSITIS OF RIGHT HIP: ICD-10-CM

## 2024-08-07 DIAGNOSIS — M25.561 CHRONIC PAIN OF RIGHT KNEE: ICD-10-CM

## 2024-08-07 DIAGNOSIS — M47.816 OSTEOARTHRITIS OF LUMBAR SPINE, UNSPECIFIED SPINAL OSTEOARTHRITIS COMPLICATION STATUS: ICD-10-CM

## 2024-08-07 NOTE — TELEPHONE ENCOUNTER
[facility]    Future Appointments   Date Time Provider Department Center   8/9/2024 10:00 AM Cole Garvin MD [facility] [address]

## 2024-08-08 RX ORDER — OXYCODONE AND ACETAMINOPHEN 10; 325 MG/1; MG/1
1-2 TABLET ORAL EVERY 4 HOURS PRN
Qty: 90 TABLET | Refills: 0 | OUTPATIENT
Start: 2024-08-08

## 2024-08-08 NOTE — TELEPHONE ENCOUNTER
Patient called to let physician know that she is completely out of the medication.  Future Appointments   Date Time Provider Department Center   8/9/2024 10:00 AM Cole Garvin MD [facility] [address]

## 2024-08-09 ENCOUNTER — LAB ENCOUNTER (OUTPATIENT)
Dept: LAB | Age: 54
End: 2024-08-09
Attending: FAMILY MEDICINE
Payer: COMMERCIAL

## 2024-08-09 ENCOUNTER — OFFICE VISIT (OUTPATIENT)
Dept: FAMILY MEDICINE CLINIC | Facility: CLINIC | Age: 54
End: 2024-08-09
Payer: COMMERCIAL

## 2024-08-09 ENCOUNTER — TELEPHONE (OUTPATIENT)
Dept: FAMILY MEDICINE CLINIC | Facility: CLINIC | Age: 54
End: 2024-08-09

## 2024-08-09 VITALS
WEIGHT: 254 LBS | DIASTOLIC BLOOD PRESSURE: 88 MMHG | HEART RATE: 90 BPM | HEIGHT: 69 IN | RESPIRATION RATE: 18 BRPM | TEMPERATURE: 97 F | SYSTOLIC BLOOD PRESSURE: 138 MMHG | BODY MASS INDEX: 37.62 KG/M2 | OXYGEN SATURATION: 99 %

## 2024-08-09 DIAGNOSIS — E66.9 OBESITY (BMI 30-39.9): ICD-10-CM

## 2024-08-09 DIAGNOSIS — G89.29 CHRONIC PAIN OF RIGHT KNEE: ICD-10-CM

## 2024-08-09 DIAGNOSIS — G89.4 CHRONIC PAIN SYNDROME: ICD-10-CM

## 2024-08-09 DIAGNOSIS — M47.816 OSTEOARTHRITIS OF LUMBAR SPINE, UNSPECIFIED SPINAL OSTEOARTHRITIS COMPLICATION STATUS: ICD-10-CM

## 2024-08-09 DIAGNOSIS — I10 PRIMARY HYPERTENSION: ICD-10-CM

## 2024-08-09 DIAGNOSIS — M70.61 TROCHANTERIC BURSITIS OF RIGHT HIP: ICD-10-CM

## 2024-08-09 DIAGNOSIS — I10 PRIMARY HYPERTENSION: Primary | ICD-10-CM

## 2024-08-09 DIAGNOSIS — S83.511A RUPTURE OF ANTERIOR CRUCIATE LIGAMENT OF RIGHT KNEE, INITIAL ENCOUNTER: ICD-10-CM

## 2024-08-09 DIAGNOSIS — M25.561 CHRONIC PAIN OF RIGHT KNEE: ICD-10-CM

## 2024-08-09 DIAGNOSIS — S83.241A TEAR OF MEDIAL MENISCUS OF RIGHT KNEE, UNSPECIFIED TEAR TYPE, UNSPECIFIED WHETHER OLD OR CURRENT TEAR, INITIAL ENCOUNTER: ICD-10-CM

## 2024-08-09 LAB
ALBUMIN SERPL-MCNC: 4.9 G/DL (ref 3.2–4.8)
ALBUMIN/GLOB SERPL: 1.7 {RATIO} (ref 1–2)
ALP LIVER SERPL-CCNC: 95 U/L
ALT SERPL-CCNC: 38 U/L
ANION GAP SERPL CALC-SCNC: 4 MMOL/L (ref 0–18)
AST SERPL-CCNC: 28 U/L (ref ?–34)
BASOPHILS # BLD AUTO: 0.03 X10(3) UL (ref 0–0.2)
BASOPHILS NFR BLD AUTO: 0.5 %
BILIRUB SERPL-MCNC: 0.6 MG/DL (ref 0.3–1.2)
BUN BLD-MCNC: 14 MG/DL (ref 9–23)
CALCIUM BLD-MCNC: 10.3 MG/DL (ref 8.7–10.4)
CHLORIDE SERPL-SCNC: 109 MMOL/L (ref 98–112)
CHOLEST SERPL-MCNC: 286 MG/DL (ref ?–200)
CO2 SERPL-SCNC: 22 MMOL/L (ref 21–32)
CREAT BLD-MCNC: 0.75 MG/DL
EGFRCR SERPLBLD CKD-EPI 2021: 96 ML/MIN/1.73M2 (ref 60–?)
EOSINOPHIL # BLD AUTO: 0.08 X10(3) UL (ref 0–0.7)
EOSINOPHIL NFR BLD AUTO: 1.4 %
ERYTHROCYTE [DISTWIDTH] IN BLOOD BY AUTOMATED COUNT: 13 %
EST. AVERAGE GLUCOSE BLD GHB EST-MCNC: 105 MG/DL (ref 68–126)
FASTING PATIENT LIPID ANSWER: NO
FASTING STATUS PATIENT QL REPORTED: NO
GLOBULIN PLAS-MCNC: 2.9 G/DL (ref 2–3.5)
GLUCOSE BLD-MCNC: 105 MG/DL (ref 70–99)
HBA1C MFR BLD: 5.3 % (ref ?–5.7)
HCT VFR BLD AUTO: 45.9 %
HDLC SERPL-MCNC: 68 MG/DL (ref 40–59)
HGB BLD-MCNC: 14.9 G/DL
IMM GRANULOCYTES # BLD AUTO: 0.02 X10(3) UL (ref 0–1)
IMM GRANULOCYTES NFR BLD: 0.4 %
LDLC SERPL CALC-MCNC: 171 MG/DL (ref ?–100)
LYMPHOCYTES # BLD AUTO: 1.5 X10(3) UL (ref 1–4)
LYMPHOCYTES NFR BLD AUTO: 26.7 %
MCH RBC QN AUTO: 30.2 PG (ref 26–34)
MCHC RBC AUTO-ENTMCNC: 32.5 G/DL (ref 31–37)
MCV RBC AUTO: 93.1 FL
MONOCYTES # BLD AUTO: 0.28 X10(3) UL (ref 0.1–1)
MONOCYTES NFR BLD AUTO: 5 %
NEUTROPHILS # BLD AUTO: 3.71 X10 (3) UL (ref 1.5–7.7)
NEUTROPHILS # BLD AUTO: 3.71 X10(3) UL (ref 1.5–7.7)
NEUTROPHILS NFR BLD AUTO: 66 %
NONHDLC SERPL-MCNC: 218 MG/DL (ref ?–130)
OSMOLALITY SERPL CALC.SUM OF ELEC: 281 MOSM/KG (ref 275–295)
PLATELET # BLD AUTO: 278 10(3)UL (ref 150–450)
POTASSIUM SERPL-SCNC: 4.2 MMOL/L (ref 3.5–5.1)
PROT SERPL-MCNC: 7.8 G/DL (ref 5.7–8.2)
RBC # BLD AUTO: 4.93 X10(6)UL
SODIUM SERPL-SCNC: 135 MMOL/L (ref 136–145)
T4 FREE SERPL-MCNC: 1.4 NG/DL (ref 0.8–1.7)
TRIGL SERPL-MCNC: 251 MG/DL (ref 30–149)
TSI SER-ACNC: 1.25 MIU/ML (ref 0.55–4.78)
VLDLC SERPL CALC-MCNC: 51 MG/DL (ref 0–30)
WBC # BLD AUTO: 5.6 X10(3) UL (ref 4–11)

## 2024-08-09 PROCEDURE — 84439 ASSAY OF FREE THYROXINE: CPT

## 2024-08-09 PROCEDURE — 84443 ASSAY THYROID STIM HORMONE: CPT

## 2024-08-09 PROCEDURE — 80061 LIPID PANEL: CPT | Performed by: FAMILY MEDICINE

## 2024-08-09 PROCEDURE — 83036 HEMOGLOBIN GLYCOSYLATED A1C: CPT

## 2024-08-09 PROCEDURE — 85025 COMPLETE CBC W/AUTO DIFF WBC: CPT | Performed by: FAMILY MEDICINE

## 2024-08-09 PROCEDURE — 36415 COLL VENOUS BLD VENIPUNCTURE: CPT | Performed by: FAMILY MEDICINE

## 2024-08-09 PROCEDURE — 80053 COMPREHEN METABOLIC PANEL: CPT | Performed by: FAMILY MEDICINE

## 2024-08-09 RX ORDER — PREDNISONE 1 MG/1
1 TABLET ORAL
Qty: 30 TABLET | Refills: 1 | Status: SHIPPED | OUTPATIENT
Start: 2024-08-09

## 2024-08-09 RX ORDER — OXYCODONE AND ACETAMINOPHEN 10; 325 MG/1; MG/1
1-2 TABLET ORAL EVERY 4 HOURS PRN
Qty: 90 TABLET | Refills: 0 | Status: SHIPPED | OUTPATIENT
Start: 2024-08-09

## 2024-08-09 RX ORDER — TIRZEPATIDE 2.5 MG/.5ML
2.5 INJECTION, SOLUTION SUBCUTANEOUS WEEKLY
Qty: 2 ML | Refills: 0 | Status: SHIPPED | OUTPATIENT
Start: 2024-08-09

## 2024-08-09 NOTE — PATIENT INSTRUCTIONS
Thank you for choosing Cole Garvin MD at [facility]  To Do: Leonie LOU Wood  1. Please take meds as directed.     Call [phone] to schedule the appointment.   Please signup for I-lighting, which is electronic access to your record if you have not done so.  All your results will post on there.  [email or web address]   You can NOW use I-lighting to book your appointments with us, or consider using open access scheduling which is through the [place] website [email or web address] and type in Cole Garvin MD and follow the links for \"Schedule Online Now\"    To schedule Imaging or tests at [facility] call Central Scheduling [phone], Go to [facility] ER Building (For example: CT scans, X rays, Ultrasound, MRI)  Cardiac Testing in ER building Building A second floor Cardiac Testing [phone] (For example: Holter Monitor, Cardiac Stress tests,Event Monitor, or 2D Echocardiograms)  [facility] call [phone] usually in [facility]  Walk in Clinic in [place] at [address] Mon-Fri at 8am-7:30 p.m., and Sat/Sun 9:00a.m.-4:30 p.m.  Also at [address] [place] [address]  Call [phone] for info     Please call our office about any questions regarding your treatment/medicines/tests as a result of today's visit.  For your safety, read the entire package insert of all medicines prescribed to you and be aware of all of the risks of treatment even beyond those discussed today.  All therapies have potential risk of harm or side effects or medication interactions.  It is your duty and for your safety to discuss with the pharmacist and our office with questions, and to notify us and stop treatment if problems arise, but know that our intention is that the benefits outweigh those potential risks and we strive to make you healthier and to improve your quality of life.    Referrals, and Radiology Information:    If your insurance requires a referral to a specialist, please allow 5 business  days to process your referral request.    If Cole Garvin MD orders a CT or MRI, it may take up to 10 business days to receive approval from your insurance company. Once our office has called informing you that the insurance company approved your testing, please call Central Scheduling at [phone]  Please allow our office 5 business days to contact you regarding any testing results.    Refill policies:   Allow 3 business days for refills; controlled substances may take longer and must be picked up from the office in person.  Narcotic medications can only be filled in 30 day increments and must be refilled at an office visit only.  If your prescription is due for a refill, you may be due for a follow-up appointment.  We cannot refill your maintenance medications at a preventative wellness visit.  To best provide you care, patients receiving maintenance medications need to be seen at least twice a year.

## 2024-08-09 NOTE — PROGRESS NOTES
Subjective:   Patient ID: Leonie Wood is a 53 year old female.    HPI  Ms. Wood is a very pleasant 53-year-old female with multiple medical conditions which include but not limited to chronic pain secondary to osteoarthritis of the lumbar spine, lumbar DJD, history of rectocele status post repair, angioedema and hives requiring prednisone, hypertension here today for her follow-up appointment.  She had received hurt her right lumbar area in the right hip.  She is requesting early refills for her Percocet which has helped manage her chronic pain.  She also has been taking her blood pressure medication which has improved her blood pressure.  She has been taking prednisone to help control her hives and feels that this is getting better and would like to be tapered off.  She is also inquiring if she should be a good candidate for weight loss medications.  History/Other:   Review of Systems  Constitutional:  Negative for fatigue and fever.   Respiratory:  Negative for cough and shortness of breath.    Cardiovascular:  Negative for chest pain.   Gastrointestinal:  Negative for abdominal pain, diarrhea, nausea and vomiting.   Musculoskeletal:  Positive for arthralgias and back pain.   Neurological:   Negative for dizziness and numbness.  Current Outpatient Medications   Medication Sig Dispense Refill    oxyCODONE-acetaminophen  MG Oral Tab Take 1-2 tablets by mouth every 4 (four) hours as needed for Pain. Ok to fill today 90 tablet 0    predniSONE 1 MG Oral Tab Take 1 tablet (1 mg total) by mouth daily with breakfast. 30 tablet 1    Tirzepatide-Weight Management (ZEPBOUND) 2.5 MG/0.5ML Subcutaneous Solution Auto-injector Inject 2.5 mg into the skin once a week. 2 mL 0    TRAMADOL 50 MG Oral Tab TAKE 1-2 TABLETS ( MG TOTAL) BY MOUTH EVERY 4 TO 6 HOURS AS NEEDED FOR PAIN. 240 tablet 0    metoprolol succinate ER 50 MG Oral Tablet 24 Hr Take 1 tablet (50 mg total) by mouth daily. 90 tablet 1     PREDNISONE 1 MG Oral Tab TAKE 4 TABLETS BY MOUTH DAILY FOR 7 DAYS, THEN 3 TABLETS DAILY FOR 7 DAYS, THEN 2 TABLETS DAILY FOR 14 DAYS. 90 tablet 0    EPINEPHrine 0.3 MG/0.3ML Injection Solution Auto-injector Inject 0.3 mL (1 each total) as directed one time.      albuterol 108 (90 Base) MCG/ACT Inhalation Aero Soln Inhale 2 puffs into the lungs every 6 (six) hours as needed for Wheezing. 3 each 1    rosuvastatin 10 MG Oral Tab Take 1 tablet (10 mg total) by mouth nightly. 90 tablet 0    CYCLOBENZAPRINE 10 MG Oral Tab TAKE 1 TABLET BY MOUTH NIGHTLY AS NEEDED FOR MUSCLE SPASMS 30 tablet 5    Ibuprofen 100 MG Oral Chew Tab       aspirin  MG Oral Tab EC TAKE ONE TABLET BY MOUTH ONE TIME DAILY POST OPERATIVELY      psyllium 28 % Oral Powd Pack Take 1 packet by mouth as needed.      cetirizine 10 MG Oral Tab Take 1-2 tablets (10-20 mg total) by mouth daily.      LORazepam 1 MG Oral Tab Take 1 tablet (1 mg total) by mouth 2 (two) times daily as needed for Anxiety. (Patient not taking: Reported on 8/9/2024) 30 tablet 0    predniSONE 5 MG Oral Tab Take 1 tablet (5 mg total) by mouth every morning. (Patient not taking: Reported on 8/9/2024) 90 tablet 0    mupirocin 2 % External Ointment Apply 1 Application topically 3 (three) times daily. (Patient not taking: Reported on 8/9/2024) 15 g 0    predniSONE 10 MG Oral Tab TAKE 1 TABLET (10 MG TOTAL) BY MOUTH 2 (TWO) TIMES DAILY FOR 3 DAYS, THEN 1 TABLET (10 MG TOTAL) DAILY FOR 3 DAYS, THEN 1/2 TABLET (5 MG TOTAL) DAILY FOR 3 DAYS. (Patient not taking: Reported on 8/9/2024) 11 tablet 0    metoprolol succinate ER 25 MG Oral Tablet 24 Hr Take 1 tablet (25 mg total) by mouth daily. (Patient not taking: Reported on 1/31/2024) 30 tablet 1    predniSONE 50 MG Oral Tab Take 1 tablet (50 mg total) by mouth as needed. For allergy flare-up (Patient not taking: Reported on 8/9/2024)      Fexofenadine HCl 180 MG Oral Tab Take 1 tablet (180 mg total) by mouth daily. (Patient not taking:  Reported on 8/9/2024)      Naloxone HCl 4 MG/0.1ML Nasal Liquid 4 mg by Nasal route as needed. If patient remains unresponsive, repeat dose in other nostril 2-5 minutes after first dose. (Patient not taking: Reported on 8/9/2024) 1 kit 0    famoTIDine 20 MG Oral Tab Take 1 tablet (20 mg total) by mouth 2 (two) times daily. (Patient not taking: Reported on 8/9/2024)      Ketotifen Fumarate (KETOTIFEN HYDROGEN FUMARATE) Does not apply Powder 2 mg. (Patient not taking: Reported on 8/9/2024)      caffeine 200 MG Oral Tab Take 1 tablet (200 mg total) by mouth daily. (Patient not taking: Reported on 8/9/2024)       Allergies:  Allergies   Allergen Reactions    Hydrochlorothiazide SWELLING    Latex ANAPHYLAXIS    Latex ANAPHYLAXIS and RASH    Morphine SWELLING     Legs swelling      Phentermine HIVES and SWELLING    Penicillin G RASH       Objective:   Physical Exam  Vitals reviewed.   Constitutional:       General: She is not in acute distress.  HENT:      Mouth/Throat:      Mouth: Mucous membranes are moist.      Pharynx: Oropharynx is clear.   Eyes:      General: No scleral icterus.     Conjunctiva/sclera: Conjunctivae normal.   Cardiovascular:      Rate and Rhythm: Normal rate and regular rhythm.      Heart sounds: Normal heart sounds. No murmur heard.  Pulmonary:      Effort: Pulmonary effort is normal. No respiratory distress.      Breath sounds: Normal breath sounds. No wheezing or rales.   Abdominal:      General: Bowel sounds are normal. There is no distension.      Palpations: Abdomen is soft.      Tenderness: There is no abdominal tenderness.   Musculoskeletal:      Cervical back: Neck supple.      Lumbar back: Tenderness present. No spasms.        Back:       Right lower leg: No edema.      Left lower leg: No edema.   Lymphadenopathy:      Cervical: No cervical adenopathy.   Skin:     General: Skin is warm.   Neurological:      Mental Status: She is alert.   Psychiatric:         Mood and Affect: Mood normal.          Assessment & Plan:   1. Primary hypertension   -Well-controlled  - Continue current medication regimen  - Will check routine labs   2. Chronic pain syndrome   -chronic stable issue, continue present management with observation and medications as noted     3. Osteoarthritis of lumbar spine, unspecified spinal osteoarthritis complication status   chronic stable issue, continue present management with observation and medications as noted     4. Tear of medial meniscus of right knee, unspecified tear type, unspecified whether old or current tear, initial encounter   chronic stable issue, continue present management with observation and medications as noted     5. Chronic pain of right knee   chronic stable issue, continue present management with observation and medications as noted     6. Rupture of anterior cruciate ligament of right knee, initial encounter   chronic stable issue, continue present management with observation and medications as noted     7. Trochanteric bursitis of right hip   chronic stable issue, continue present management with observation and medications as noted     8. Obesity (BMI 30-39.9)   -Trial of tirzepatide 2.5 mL every week  - Discussed possible side effects from this medication  - Follow-up after 4 weeks after initiation of this medication     Sent for Percocet to pharmacy  Given instructions with prednisone taper  Labs have been ordered and we will notify her once we get this results and provide with recommendations thereafter.    This note was prepared using Dragon Medical voice recognition dictation software. As a result errors may occur. When identified these errors have been corrected. While every attempt is made to correct errors during dictation discrepancies may still exist          Orders Placed This Encounter   Procedures    TSH and Free T4 [E]    Hemoglobin A1C [E]    CBC W Differential W Platelet [E]    Comp Metabolic Panel (14) [E]    Lipid Panel [E]       Meds This  Visit:  Requested Prescriptions     Signed Prescriptions Disp Refills    oxyCODONE-acetaminophen  MG Oral Tab 90 tablet 0     Sig: Take 1-2 tablets by mouth every 4 (four) hours as needed for Pain. Ok to fill today    predniSONE 1 MG Oral Tab 30 tablet 1     Sig: Take 1 tablet (1 mg total) by mouth daily with breakfast.    Tirzepatide-Weight Management (ZEPBOUND) 2.5 MG/0.5ML Subcutaneous Solution Auto-injector 2 mL 0     Sig: Inject 2.5 mg into the skin once a week.       Imaging & Referrals:  None

## 2024-08-09 NOTE — TELEPHONE ENCOUNTER
Patient called and said she was here earlier and she went to  her meds and there is a new pharmacist on duty and he needs to verify that it is ok for her to get her meds refilled early.     
Spoke with pharmacist, advised ok per Dr. Garvin to fill oxycodone-tylenol today.  
Yes

## 2024-08-13 DIAGNOSIS — S83.241A TEAR OF MEDIAL MENISCUS OF RIGHT KNEE, UNSPECIFIED TEAR TYPE, UNSPECIFIED WHETHER OLD OR CURRENT TEAR, INITIAL ENCOUNTER: ICD-10-CM

## 2024-08-13 DIAGNOSIS — M47.816 OSTEOARTHRITIS OF LUMBAR SPINE, UNSPECIFIED SPINAL OSTEOARTHRITIS COMPLICATION STATUS: ICD-10-CM

## 2024-08-13 DIAGNOSIS — M25.561 CHRONIC PAIN OF RIGHT KNEE: ICD-10-CM

## 2024-08-13 DIAGNOSIS — G89.4 CHRONIC PAIN SYNDROME: ICD-10-CM

## 2024-08-13 DIAGNOSIS — G89.29 CHRONIC PAIN OF RIGHT KNEE: ICD-10-CM

## 2024-08-13 DIAGNOSIS — S83.511A RUPTURE OF ANTERIOR CRUCIATE LIGAMENT OF RIGHT KNEE, INITIAL ENCOUNTER: ICD-10-CM

## 2024-08-13 RX ORDER — TRAMADOL HYDROCHLORIDE 50 MG/1
TABLET ORAL
Qty: 240 TABLET | Refills: 0 | Status: SHIPPED | OUTPATIENT
Start: 2024-08-13

## 2024-08-13 NOTE — TELEPHONE ENCOUNTER
Called patient, left message to call back. Please obtain details for spouses Family Medical Leave Act, task provider.

## 2024-08-13 NOTE — TELEPHONE ENCOUNTER
Patient returned call -     Type of Leave: intermittent  Reason for Leave:Athralgia  Start date of leave:  How much time needed?:   Forms Due Date:  Was Fee and Turnaround info Given?:

## 2024-08-15 DIAGNOSIS — E78.00 ELEVATED LDL CHOLESTEROL LEVEL: ICD-10-CM

## 2024-08-15 DIAGNOSIS — E78.5 DYSLIPIDEMIA: Primary | ICD-10-CM

## 2024-08-15 RX ORDER — ROSUVASTATIN CALCIUM 20 MG/1
20 TABLET, COATED ORAL NIGHTLY
Qty: 90 TABLET | Refills: 0 | Status: SHIPPED | OUTPATIENT
Start: 2024-08-15

## 2024-08-15 NOTE — TELEPHONE ENCOUNTER
Called patient, left message to call back. Please obtain complete details and task provider if needed.

## 2024-08-21 NOTE — TELEPHONE ENCOUNTER
2nd attempt to reach patient to obtain details for spouses Family Medical Leave Act. Left message to call back. Please obtain details and task provider if needed. Forms placed in hold folder.

## 2024-08-21 NOTE — TELEPHONE ENCOUNTER
Patient called requesting for forms to be completed ASAP all information is the same as every other year- advised patient will work on forms ASAP and keep her updated via [employer] verbal understanding

## 2024-08-21 NOTE — TELEPHONE ENCOUNTER
Please try to avoid signing forms in the corner as it is not visible when printing and forms are not accepted this way. Thank you!     Dr. Garvin     *The ACKNOWLEDGE button has been moved to the top right ribbon*    Please sign off on form if you agree to: FMLA Spouse re certification  Start: 07/18/24 End 07/18/25   1-2 flare ups per week each episode lasting 1-2 days  (place your signature on the first page only)    -From your Inbasket, Highlight the patient and click Chart   -Double click the 07/29/24 Forms Completion telephone encounter  -Scroll down to the Media section   -Click the blue Hyperlink: FMLA Spouse Dr Garvin 08/21/24  -Click Acknowledge located in the top right ribbon/menu   -Drag the mouse into the blank space of the document and a + sign will appear. Left click to   electronically sign the document.     Thank you,     Kimi

## 2024-08-22 DIAGNOSIS — M70.61 TROCHANTERIC BURSITIS OF RIGHT HIP: ICD-10-CM

## 2024-08-22 DIAGNOSIS — G89.29 CHRONIC PAIN OF RIGHT KNEE: ICD-10-CM

## 2024-08-22 DIAGNOSIS — S83.241A TEAR OF MEDIAL MENISCUS OF RIGHT KNEE, UNSPECIFIED TEAR TYPE, UNSPECIFIED WHETHER OLD OR CURRENT TEAR, INITIAL ENCOUNTER: ICD-10-CM

## 2024-08-22 DIAGNOSIS — M25.561 CHRONIC PAIN OF RIGHT KNEE: ICD-10-CM

## 2024-08-22 DIAGNOSIS — S83.511A RUPTURE OF ANTERIOR CRUCIATE LIGAMENT OF RIGHT KNEE, INITIAL ENCOUNTER: ICD-10-CM

## 2024-08-22 DIAGNOSIS — M47.816 OSTEOARTHRITIS OF LUMBAR SPINE, UNSPECIFIED SPINAL OSTEOARTHRITIS COMPLICATION STATUS: ICD-10-CM

## 2024-08-22 DIAGNOSIS — G89.4 CHRONIC PAIN SYNDROME: ICD-10-CM

## 2024-08-22 RX ORDER — OXYCODONE AND ACETAMINOPHEN 10; 325 MG/1; MG/1
1-2 TABLET ORAL EVERY 4 HOURS PRN
Refills: 0 | OUTPATIENT
Start: 2024-08-22

## 2024-08-22 NOTE — TELEPHONE ENCOUNTER
Patient calling to ask for a refill of the Norco. She is taking 2 every 8 hours. Please send to local [facility].

## 2024-08-22 NOTE — TELEPHONE ENCOUNTER
Medication Quantity Refills Start End   oxyCODONE-acetaminophen  MG Oral Tab 90 tablet 0 8/9/2024 --   Sig:   Take 1-2 tablets by mouth every 4 (four) hours as needed for Pain. Ok to fill today     Route:   Oral     PRN Reason(s):   Pain     Earliest Fill Date:   8/9/2024     Order #:   [id number]       Last OV 8/9/24  No future appointments.   OXYCODONE-Acetaminophen     Dispensed Written Strength Quantity Refills Days Supply Provider Pharmacy   OXYCODONE-ACETAMINOPHEN  08/09/2024 08/09/2024  90 each  14 [id number] [place]...

## 2024-08-22 NOTE — TELEPHONE ENCOUNTER
Forms E faxed     Efax Date:8/22/24  Time fax sent:7:59 AM  Confirmation time : 10:34 M  JOB ID:[id number]    Forms completed and signed faxed to [facility] [phone]

## 2024-08-23 RX ORDER — OXYCODONE AND ACETAMINOPHEN 10; 325 MG/1; MG/1
1-2 TABLET ORAL EVERY 4 HOURS PRN
Qty: 90 TABLET | Refills: 0 | Status: SHIPPED | OUTPATIENT
Start: 2024-08-23

## 2024-08-23 NOTE — TELEPHONE ENCOUNTER
Patient calling in stating that her rx has not been sent to the pharmacy an patient is out of medication.     Please advise.

## 2024-08-26 PROBLEM — S83.231A COMPLEX TEAR OF MEDIAL MENISCUS, CURRENT INJURY, RIGHT KNEE, INITIAL ENCOUNTER: Status: RESOLVED | Noted: 2021-05-10 | Resolved: 2024-08-26

## 2024-09-05 ENCOUNTER — TELEPHONE (OUTPATIENT)
Dept: FAMILY MEDICINE CLINIC | Facility: CLINIC | Age: 54
End: 2024-09-05

## 2024-09-05 NOTE — TELEPHONE ENCOUNTER
Patient requesting refill for Oxycodone-Tylenol 10/325mg. This was just rx'd on 8/23/24 for #90.    Per [place] , last dispensed 8/23/24 #90 (8 day supply)    Ok to refill this now? She just got 90 tablets on 8/23/24

## 2024-09-09 ENCOUNTER — TELEMEDICINE (OUTPATIENT)
Dept: FAMILY MEDICINE CLINIC | Facility: CLINIC | Age: 54
End: 2024-09-09
Payer: COMMERCIAL

## 2024-09-09 DIAGNOSIS — G89.4 CHRONIC PAIN SYNDROME: ICD-10-CM

## 2024-09-09 DIAGNOSIS — S83.511A RUPTURE OF ANTERIOR CRUCIATE LIGAMENT OF RIGHT KNEE, INITIAL ENCOUNTER: ICD-10-CM

## 2024-09-09 DIAGNOSIS — M25.561 CHRONIC PAIN OF RIGHT KNEE: ICD-10-CM

## 2024-09-09 DIAGNOSIS — I10 PRIMARY HYPERTENSION: ICD-10-CM

## 2024-09-09 DIAGNOSIS — S83.241A TEAR OF MEDIAL MENISCUS OF RIGHT KNEE, UNSPECIFIED TEAR TYPE, UNSPECIFIED WHETHER OLD OR CURRENT TEAR, INITIAL ENCOUNTER: ICD-10-CM

## 2024-09-09 DIAGNOSIS — M70.61 TROCHANTERIC BURSITIS OF RIGHT HIP: ICD-10-CM

## 2024-09-09 DIAGNOSIS — E66.9 OBESITY (BMI 30-39.9): ICD-10-CM

## 2024-09-09 DIAGNOSIS — G89.29 CHRONIC PAIN OF RIGHT KNEE: ICD-10-CM

## 2024-09-09 DIAGNOSIS — M47.816 OSTEOARTHRITIS OF LUMBAR SPINE, UNSPECIFIED SPINAL OSTEOARTHRITIS COMPLICATION STATUS: Primary | ICD-10-CM

## 2024-09-09 RX ORDER — OXYCODONE AND ACETAMINOPHEN 10; 325 MG/1; MG/1
1-2 TABLET ORAL EVERY 4 HOURS PRN
Qty: 90 TABLET | Refills: 0 | Status: SHIPPED | OUTPATIENT
Start: 2024-09-09

## 2024-09-09 RX ORDER — METOPROLOL SUCCINATE 100 MG/1
100 TABLET, EXTENDED RELEASE ORAL DAILY
Qty: 90 TABLET | Refills: 1 | Status: SHIPPED | OUTPATIENT
Start: 2024-09-09

## 2024-09-09 RX ORDER — METOPROLOL SUCCINATE 50 MG/1
50 TABLET, EXTENDED RELEASE ORAL DAILY
Qty: 90 TABLET | Refills: 1 | OUTPATIENT
Start: 2024-09-09

## 2024-09-09 NOTE — PROGRESS NOTES
Subjective:   Patient ID: Leonie Wood is a 53 year old female.    HPI  Ms. Wood is a very pleasant 53-year-old female with multiple medical conditions which include but not limited to chronic pain secondary to osteoarthritis of the lumbar spine, lumbar DJD, history of rectocele status post repair, angioedema and hives requiring prednisone, hypertension, hyperlipidemia presenting today  video visit.  I had prescribed her with Zepbound 2.5 mg every week but was not approved by insurance.  She is looking for options at this point in terms to help her with lose weight.  Blood pressure is noted to be elevated still despite taking metoprolol.  She was recently started on rosuvastatin.  She will need new refills for her Percocet and what be needing this for 30 days instead of needing it more frequently than this. I had reviewed past medical and family histories together with allergy and medication lists documented.    History/Other:   Review of Systems  Constitutional:  Negative for fatigue and fever.   Respiratory:  Negative for cough and shortness of breath.    Cardiovascular:  Negative for chest pain.   Gastrointestinal:  Negative for abdominal pain, diarrhea, nausea and vomiting.   Musculoskeletal:  Positive for arthralgias and back pain.   Neurological:   Negative for dizziness and numbness.  Current Outpatient Medications   Medication Sig Dispense Refill    oxyCODONE-acetaminophen  MG Oral Tab Take 1-2 tablets by mouth every 4 (four) hours as needed for Pain. Ok to fill today 90 tablet 0    metoprolol succinate  MG Oral Tablet 24 Hr Take 1 tablet (100 mg total) by mouth daily. 90 tablet 1    semaglutide-weight management 0.25 MG/0.5ML Subcutaneous Solution Auto-injector Inject 0.5 mL (0.25 mg total) into the skin once a week for 4 doses. 2 mL 0    rosuvastatin 20 MG Oral Tab Take 1 tablet (20 mg total) by mouth nightly. 90 tablet 0    TRAMADOL 50 MG Oral Tab TAKE 1-2 TABLETS ( MG TOTAL)  BY MOUTH EVERY 4 TO 6 HOURS AS NEEDED FOR PAIN. 240 tablet 0    predniSONE 1 MG Oral Tab Take 1 tablet (1 mg total) by mouth daily with breakfast. 30 tablet 1    metoprolol succinate ER 50 MG Oral Tablet 24 Hr Take 1 tablet (50 mg total) by mouth daily. 90 tablet 1    PREDNISONE 1 MG Oral Tab TAKE 4 TABLETS BY MOUTH DAILY FOR 7 DAYS, THEN 3 TABLETS DAILY FOR 7 DAYS, THEN 2 TABLETS DAILY FOR 14 DAYS. 90 tablet 0    EPINEPHrine 0.3 MG/0.3ML Injection Solution Auto-injector Inject 0.3 mL (1 each total) as directed one time.      LORazepam 1 MG Oral Tab Take 1 tablet (1 mg total) by mouth 2 (two) times daily as needed for Anxiety. (Patient not taking: Reported on 8/9/2024) 30 tablet 0    predniSONE 5 MG Oral Tab Take 1 tablet (5 mg total) by mouth every morning. (Patient not taking: Reported on 8/9/2024) 90 tablet 0    albuterol 108 (90 Base) MCG/ACT Inhalation Aero Soln Inhale 2 puffs into the lungs every 6 (six) hours as needed for Wheezing. 3 each 1    mupirocin 2 % External Ointment Apply 1 Application topically 3 (three) times daily. (Patient not taking: Reported on 8/9/2024) 15 g 0    predniSONE 10 MG Oral Tab TAKE 1 TABLET (10 MG TOTAL) BY MOUTH 2 (TWO) TIMES DAILY FOR 3 DAYS, THEN 1 TABLET (10 MG TOTAL) DAILY FOR 3 DAYS, THEN 1/2 TABLET (5 MG TOTAL) DAILY FOR 3 DAYS. (Patient not taking: Reported on 8/9/2024) 11 tablet 0    metoprolol succinate ER 25 MG Oral Tablet 24 Hr Take 1 tablet (25 mg total) by mouth daily. (Patient not taking: Reported on 1/31/2024) 30 tablet 1    CYCLOBENZAPRINE 10 MG Oral Tab TAKE 1 TABLET BY MOUTH NIGHTLY AS NEEDED FOR MUSCLE SPASMS 30 tablet 5    Ibuprofen 100 MG Oral Chew Tab       predniSONE 50 MG Oral Tab Take 1 tablet (50 mg total) by mouth as needed. For allergy flare-up (Patient not taking: Reported on 8/9/2024)      aspirin  MG Oral Tab EC TAKE ONE TABLET BY MOUTH ONE TIME DAILY POST OPERATIVELY      Fexofenadine HCl 180 MG Oral Tab Take 1 tablet (180 mg total) by mouth  daily. (Patient not taking: Reported on 8/9/2024)      Naloxone HCl 4 MG/0.1ML Nasal Liquid 4 mg by Nasal route as needed. If patient remains unresponsive, repeat dose in other nostril 2-5 minutes after first dose. (Patient not taking: Reported on 8/9/2024) 1 kit 0    famoTIDine 20 MG Oral Tab Take 1 tablet (20 mg total) by mouth 2 (two) times daily. (Patient not taking: Reported on 8/9/2024)      Ketotifen Fumarate (KETOTIFEN HYDROGEN FUMARATE) Does not apply Powder 2 mg. (Patient not taking: Reported on 8/9/2024)      psyllium 28 % Oral Powd Pack Take 1 packet by mouth as needed.      cetirizine 10 MG Oral Tab Take 1-2 tablets (10-20 mg total) by mouth daily.      caffeine 200 MG Oral Tab Take 1 tablet (200 mg total) by mouth daily. (Patient not taking: Reported on 8/9/2024)       Allergies:  Allergies   Allergen Reactions    Hydrochlorothiazide SWELLING    Latex ANAPHYLAXIS    Latex ANAPHYLAXIS and RASH    Morphine SWELLING     Legs swelling      Phentermine HIVES and SWELLING    Penicillin G RASH       Objective:   Physical Exam  Constitutional:       General: She is not in acute distress.  Neurological:      Mental Status: She is alert.   Psychiatric:         Mood and Affect: Mood normal.         Behavior: Behavior normal.         Assessment & Plan:   1. Osteoarthritis of lumbar spine, unspecified spinal osteoarthritis complication status   chronic stable issue, continue present management with observation and medications as noted     2. Primary hypertension   -Will increase metoprolol succinate ER to 100 mg daily  - Monitor blood pressure levels and give us an update after few days to few weeks   3. Chronic pain syndrome   chronic stable issue, continue present management with observation and medications as noted     4. Tear of medial meniscus of right knee, unspecified tear type, unspecified whether old or current tear, initial encounter   -stable     5. Chronic pain of right knee   -chronic stable issue,  continue present management with observation and medications as noted     6. Rupture of anterior cruciate ligament of right knee, initial encounter   stable   7. Trochanteric bursitis of right hip   -stable   8. Obesity (BMI 30-39.9)   -Incorporate more proteins in diet and maintain low-carb and calorie deficit  - Will try Wegovy 0.25 mg every week     Follow-up after 1 month    This note was prepared using Dragon Medical voice recognition dictation software. As a result errors may occur. When identified these errors have been corrected. While every attempt is made to correct errors during dictation discrepancies may still exist          No orders of the defined types were placed in this encounter.      Meds This Visit:  Requested Prescriptions     Signed Prescriptions Disp Refills    oxyCODONE-acetaminophen  MG Oral Tab 90 tablet 0     Sig: Take 1-2 tablets by mouth every 4 (four) hours as needed for Pain. Ok to fill today    metoprolol succinate  MG Oral Tablet 24 Hr 90 tablet 1     Sig: Take 1 tablet (100 mg total) by mouth daily.    semaglutide-weight management 0.25 MG/0.5ML Subcutaneous Solution Auto-injector 2 mL 0     Sig: Inject 0.5 mL (0.25 mg total) into the skin once a week for 4 doses.       Imaging & Referrals:  None

## 2024-09-10 DIAGNOSIS — S83.241A TEAR OF MEDIAL MENISCUS OF RIGHT KNEE, UNSPECIFIED TEAR TYPE, UNSPECIFIED WHETHER OLD OR CURRENT TEAR, INITIAL ENCOUNTER: ICD-10-CM

## 2024-09-10 DIAGNOSIS — G89.4 CHRONIC PAIN SYNDROME: ICD-10-CM

## 2024-09-10 DIAGNOSIS — M47.816 OSTEOARTHRITIS OF LUMBAR SPINE, UNSPECIFIED SPINAL OSTEOARTHRITIS COMPLICATION STATUS: ICD-10-CM

## 2024-09-10 DIAGNOSIS — G89.29 CHRONIC PAIN OF RIGHT KNEE: ICD-10-CM

## 2024-09-10 DIAGNOSIS — S83.511A RUPTURE OF ANTERIOR CRUCIATE LIGAMENT OF RIGHT KNEE, INITIAL ENCOUNTER: ICD-10-CM

## 2024-09-10 DIAGNOSIS — M25.561 CHRONIC PAIN OF RIGHT KNEE: ICD-10-CM

## 2024-09-10 RX ORDER — TRAMADOL HYDROCHLORIDE 50 MG/1
TABLET ORAL
Qty: 240 TABLET | Refills: 0 | Status: SHIPPED | OUTPATIENT
Start: 2024-09-10

## 2024-09-24 ENCOUNTER — TELEMEDICINE (OUTPATIENT)
Dept: FAMILY MEDICINE CLINIC | Facility: CLINIC | Age: 54
End: 2024-09-24
Payer: COMMERCIAL

## 2024-09-24 DIAGNOSIS — R23.2 HOT FLASHES: ICD-10-CM

## 2024-09-24 DIAGNOSIS — I10 PRIMARY HYPERTENSION: Primary | ICD-10-CM

## 2024-09-24 DIAGNOSIS — G89.29 CHRONIC PAIN OF RIGHT KNEE: ICD-10-CM

## 2024-09-24 DIAGNOSIS — M70.61 TROCHANTERIC BURSITIS OF RIGHT HIP: ICD-10-CM

## 2024-09-24 DIAGNOSIS — M25.561 CHRONIC PAIN OF RIGHT KNEE: ICD-10-CM

## 2024-09-24 DIAGNOSIS — M47.816 OSTEOARTHRITIS OF LUMBAR SPINE, UNSPECIFIED SPINAL OSTEOARTHRITIS COMPLICATION STATUS: ICD-10-CM

## 2024-09-24 DIAGNOSIS — G89.4 CHRONIC PAIN SYNDROME: ICD-10-CM

## 2024-09-24 DIAGNOSIS — S83.241A TEAR OF MEDIAL MENISCUS OF RIGHT KNEE, UNSPECIFIED TEAR TYPE, UNSPECIFIED WHETHER OLD OR CURRENT TEAR, INITIAL ENCOUNTER: ICD-10-CM

## 2024-09-24 DIAGNOSIS — S83.511A RUPTURE OF ANTERIOR CRUCIATE LIGAMENT OF RIGHT KNEE, INITIAL ENCOUNTER: ICD-10-CM

## 2024-09-24 PROCEDURE — 99214 OFFICE O/P EST MOD 30 MIN: CPT | Performed by: FAMILY MEDICINE

## 2024-09-24 RX ORDER — OXYCODONE AND ACETAMINOPHEN 10; 325 MG/1; MG/1
1 TABLET ORAL EVERY 4 HOURS PRN
Qty: 90 TABLET | Refills: 0 | Status: SHIPPED | OUTPATIENT
Start: 2024-09-24

## 2024-09-24 RX ORDER — HYDRALAZINE HYDROCHLORIDE 25 MG/1
25 TABLET, FILM COATED ORAL 2 TIMES DAILY
Qty: 60 TABLET | Refills: 1 | Status: SHIPPED | OUTPATIENT
Start: 2024-09-24

## 2024-09-24 NOTE — PROGRESS NOTES
Subjective:   Patient ID: Leonie Wood is a 54 year old female.    HPI  Ms. Wood is a very pleasant 54-year-old female with multiple medical conditions which include but not limited to chronic pain secondary to osteoarthritis of the lumbar spine, lumbar DJD, history of rectocele status post repair, angioedema and hives requiring prednisone, hypertension, hyperlipidemia presenting for video visit today for blood pressure management.  Her blood pressure over the weekend was systolic blood pressure of 200.  She did check it today before this visit and was noted to be 170/98.  She has been taking prednisone to help with her hives and allergies.  This has been prescribed by her in the past by her allergist.  She had noted that when she takes his medication that her blood pressure will be elevated.  She has been experiencing headaches.  No fever no chest pain no shortness of breath no palpitations no nausea no vomiting no abdominal pain.  She takes her metoprolol compliantly.  She also reports that she has been experiencing hot flashes and is wondering if I could prescribe her with estrogen to help with this.  She would need refills for her pain medication, Percocet. I had reviewed past medical and family histories together with allergy and medication lists documented.    History/Other:   Review of Systems  Constitutional:  Negative for fatigue and fever.   Respiratory:  Negative for cough and shortness of breath.    Cardiovascular:  Negative for chest pain.   Gastrointestinal:  Negative for abdominal pain, diarrhea, nausea and vomiting.   Musculoskeletal:  Positive for arthralgias and back pain.   Neurological:   Negative for dizziness and numbness.  Current Outpatient Medications   Medication Sig Dispense Refill    hydrALAZINE 25 MG Oral Tab Take 1 tablet (25 mg total) by mouth in the morning and 1 tablet (25 mg total) before bedtime. 60 tablet 1    oxyCODONE-acetaminophen  MG Oral Tab Take 1 tablet by  mouth every 4 (four) hours as needed for Pain. Ok to fill today 90 tablet 0    traMADol 50 MG Oral Tab Take 1-2 tablets ( mg total) by mouth every 4 to 6 hours as needed for Pain. 240 tablet 0    metoprolol succinate  MG Oral Tablet 24 Hr Take 1 tablet (100 mg total) by mouth daily. 90 tablet 1    semaglutide-weight management 0.25 MG/0.5ML Subcutaneous Solution Auto-injector Inject 0.5 mL (0.25 mg total) into the skin once a week for 4 doses. 2 mL 0    rosuvastatin 20 MG Oral Tab Take 1 tablet (20 mg total) by mouth nightly. 90 tablet 0    predniSONE 1 MG Oral Tab Take 1 tablet (1 mg total) by mouth daily with breakfast. 30 tablet 1    metoprolol succinate ER 50 MG Oral Tablet 24 Hr Take 1 tablet (50 mg total) by mouth daily. 90 tablet 1    PREDNISONE 1 MG Oral Tab TAKE 4 TABLETS BY MOUTH DAILY FOR 7 DAYS, THEN 3 TABLETS DAILY FOR 7 DAYS, THEN 2 TABLETS DAILY FOR 14 DAYS. 90 tablet 0    EPINEPHrine 0.3 MG/0.3ML Injection Solution Auto-injector Inject 0.3 mL (1 each total) as directed one time.      LORazepam 1 MG Oral Tab Take 1 tablet (1 mg total) by mouth 2 (two) times daily as needed for Anxiety. (Patient not taking: Reported on 8/9/2024) 30 tablet 0    predniSONE 5 MG Oral Tab Take 1 tablet (5 mg total) by mouth every morning. (Patient not taking: Reported on 8/9/2024) 90 tablet 0    albuterol 108 (90 Base) MCG/ACT Inhalation Aero Soln Inhale 2 puffs into the lungs every 6 (six) hours as needed for Wheezing. 3 each 1    mupirocin 2 % External Ointment Apply 1 Application topically 3 (three) times daily. (Patient not taking: Reported on 8/9/2024) 15 g 0    predniSONE 10 MG Oral Tab TAKE 1 TABLET (10 MG TOTAL) BY MOUTH 2 (TWO) TIMES DAILY FOR 3 DAYS, THEN 1 TABLET (10 MG TOTAL) DAILY FOR 3 DAYS, THEN 1/2 TABLET (5 MG TOTAL) DAILY FOR 3 DAYS. (Patient not taking: Reported on 8/9/2024) 11 tablet 0    metoprolol succinate ER 25 MG Oral Tablet 24 Hr Take 1 tablet (25 mg total) by mouth daily. (Patient not  taking: Reported on 1/31/2024) 30 tablet 1    CYCLOBENZAPRINE 10 MG Oral Tab TAKE 1 TABLET BY MOUTH NIGHTLY AS NEEDED FOR MUSCLE SPASMS 30 tablet 5    Ibuprofen 100 MG Oral Chew Tab       predniSONE 50 MG Oral Tab Take 1 tablet (50 mg total) by mouth as needed. For allergy flare-up (Patient not taking: Reported on 8/9/2024)      aspirin  MG Oral Tab EC TAKE ONE TABLET BY MOUTH ONE TIME DAILY POST OPERATIVELY      Fexofenadine HCl 180 MG Oral Tab Take 1 tablet (180 mg total) by mouth daily. (Patient not taking: Reported on 8/9/2024)      Naloxone HCl 4 MG/0.1ML Nasal Liquid 4 mg by Nasal route as needed. If patient remains unresponsive, repeat dose in other nostril 2-5 minutes after first dose. (Patient not taking: Reported on 8/9/2024) 1 kit 0    famoTIDine 20 MG Oral Tab Take 1 tablet (20 mg total) by mouth 2 (two) times daily. (Patient not taking: Reported on 8/9/2024)      Ketotifen Fumarate (KETOTIFEN HYDROGEN FUMARATE) Does not apply Powder 2 mg. (Patient not taking: Reported on 8/9/2024)      psyllium 28 % Oral Powd Pack Take 1 packet by mouth as needed.      cetirizine 10 MG Oral Tab Take 1-2 tablets (10-20 mg total) by mouth daily.      caffeine 200 MG Oral Tab Take 1 tablet (200 mg total) by mouth daily. (Patient not taking: Reported on 8/9/2024)       Allergies:  Allergies   Allergen Reactions    Hydrochlorothiazide SWELLING    Latex ANAPHYLAXIS    Latex ANAPHYLAXIS and RASH    Morphine SWELLING     Legs swelling      Phentermine HIVES and SWELLING    Penicillin G RASH       Objective:   Physical Exam  Constitutional:       General: She is not in acute distress.  Neurological:      Mental Status: She is alert.   Psychiatric:         Mood and Affect: Mood normal.         Behavior: Behavior normal.         Assessment & Plan:   1. Primary hypertension   -Agree that prednisone is contributing to this  - Continue metoprolol.  Will add hydralazine 25 mg twice a day  - Monitor blood pressure levels  - If  would remain to be elevated will likely refer to cardiology  - We may need to wean her off prednisone but will need guidance from her allergist.   2. Osteoarthritis of lumbar spine, unspecified spinal osteoarthritis complication status    3. Chronic pain syndrome    4. Tear of medial meniscus of right knee, unspecified tear type, unspecified whether old or current tear, initial encounter    5. Chronic pain of right knee    6. Rupture of anterior cruciate ligament of right knee, initial encounter    7. Trochanteric bursitis of right hip    8. Hot flashes      With regards to her hot flashes, I will refer her to OB/GYN but she lives in [place] and will try to refer her where she lives so that she does not have to travel for this year.  Will renew pain medication regimen which was sent to her pharmacy  - Call or come in sooner if symptoms worsen or persist    This note was prepared using Dragon Medical voice recognition dictation software. As a result errors may occur. When identified these errors have been corrected. While every attempt is made to correct errors during dictation discrepancies may still exist          No orders of the defined types were placed in this encounter.      Meds This Visit:  Requested Prescriptions     Signed Prescriptions Disp Refills    hydrALAZINE 25 MG Oral Tab 60 tablet 1     Sig: Take 1 tablet (25 mg total) by mouth in the morning and 1 tablet (25 mg total) before bedtime.    oxyCODONE-acetaminophen  MG Oral Tab 90 tablet 0     Sig: Take 1 tablet by mouth every 4 (four) hours as needed for Pain. Ok to fill today       Imaging & Referrals:  None

## 2024-10-05 ENCOUNTER — PATIENT MESSAGE (OUTPATIENT)
Dept: FAMILY MEDICINE CLINIC | Facility: CLINIC | Age: 54
End: 2024-10-05

## 2024-10-05 RX ORDER — PREDNISONE 1 MG/1
1 TABLET ORAL
Qty: 30 TABLET | Refills: 1 | Status: SHIPPED | OUTPATIENT
Start: 2024-10-05

## 2024-10-07 NOTE — TELEPHONE ENCOUNTER
From: Cole Garvin  To: Leonie Wood  Sent: 10/5/2024 9:46 AM CDT  Subject: prednisone    It may good to see the allergist again.

## 2024-10-08 DIAGNOSIS — G89.4 CHRONIC PAIN SYNDROME: ICD-10-CM

## 2024-10-08 DIAGNOSIS — S83.511A RUPTURE OF ANTERIOR CRUCIATE LIGAMENT OF RIGHT KNEE, INITIAL ENCOUNTER: ICD-10-CM

## 2024-10-08 DIAGNOSIS — M47.816 OSTEOARTHRITIS OF LUMBAR SPINE, UNSPECIFIED SPINAL OSTEOARTHRITIS COMPLICATION STATUS: ICD-10-CM

## 2024-10-08 DIAGNOSIS — G89.29 CHRONIC PAIN OF RIGHT KNEE: ICD-10-CM

## 2024-10-08 DIAGNOSIS — M25.561 CHRONIC PAIN OF RIGHT KNEE: ICD-10-CM

## 2024-10-08 DIAGNOSIS — S83.241A TEAR OF MEDIAL MENISCUS OF RIGHT KNEE, UNSPECIFIED TEAR TYPE, UNSPECIFIED WHETHER OLD OR CURRENT TEAR, INITIAL ENCOUNTER: ICD-10-CM

## 2024-10-08 RX ORDER — TRAMADOL HYDROCHLORIDE 50 MG/1
TABLET ORAL
Qty: 240 TABLET | Refills: 0 | Status: SHIPPED | OUTPATIENT
Start: 2024-10-08

## 2024-10-08 NOTE — TELEPHONE ENCOUNTER
Requesting Tramadol 50mg  Last OV: 9/24/24 Telemedicine  RTC: prn  Last Rx'd 9/10/24 #240 with 0 refills    No future appointments.    Per [place] , last dispensed 9/12/24 #240    Controlled med:  Rx pended and routed for approval/denial

## 2024-10-16 DIAGNOSIS — S83.241A TEAR OF MEDIAL MENISCUS OF RIGHT KNEE, UNSPECIFIED TEAR TYPE, UNSPECIFIED WHETHER OLD OR CURRENT TEAR, INITIAL ENCOUNTER: ICD-10-CM

## 2024-10-16 DIAGNOSIS — M47.816 OSTEOARTHRITIS OF LUMBAR SPINE, UNSPECIFIED SPINAL OSTEOARTHRITIS COMPLICATION STATUS: ICD-10-CM

## 2024-10-16 DIAGNOSIS — M70.61 TROCHANTERIC BURSITIS OF RIGHT HIP: ICD-10-CM

## 2024-10-16 DIAGNOSIS — M25.561 CHRONIC PAIN OF RIGHT KNEE: ICD-10-CM

## 2024-10-16 DIAGNOSIS — S83.511A RUPTURE OF ANTERIOR CRUCIATE LIGAMENT OF RIGHT KNEE, INITIAL ENCOUNTER: ICD-10-CM

## 2024-10-16 DIAGNOSIS — G89.4 CHRONIC PAIN SYNDROME: ICD-10-CM

## 2024-10-16 DIAGNOSIS — G89.29 CHRONIC PAIN OF RIGHT KNEE: ICD-10-CM

## 2024-10-16 RX ORDER — OXYCODONE AND ACETAMINOPHEN 10; 325 MG/1; MG/1
1 TABLET ORAL EVERY 4 HOURS PRN
Qty: 90 TABLET | Refills: 0 | Status: SHIPPED | OUTPATIENT
Start: 2024-10-16

## 2024-10-16 NOTE — TELEPHONE ENCOUNTER
Requesting Oxycodone-Acetaminophen 10/325mg  Last OV: 9/24/24 Telemedicine  RTC: prn  Last Rx'd 9/24/24 #90 with 0 refills    No future appointments.    Per [place] , last dispensed 9/24/24 #90 (15 day supply)    Controlled med:  Rx pended and routed for approval/denial

## 2024-11-05 DIAGNOSIS — S83.511A RUPTURE OF ANTERIOR CRUCIATE LIGAMENT OF RIGHT KNEE, INITIAL ENCOUNTER: ICD-10-CM

## 2024-11-05 DIAGNOSIS — M25.561 CHRONIC PAIN OF RIGHT KNEE: ICD-10-CM

## 2024-11-05 DIAGNOSIS — G89.29 CHRONIC PAIN OF RIGHT KNEE: ICD-10-CM

## 2024-11-05 DIAGNOSIS — G89.4 CHRONIC PAIN SYNDROME: ICD-10-CM

## 2024-11-05 DIAGNOSIS — M47.816 OSTEOARTHRITIS OF LUMBAR SPINE, UNSPECIFIED SPINAL OSTEOARTHRITIS COMPLICATION STATUS: ICD-10-CM

## 2024-11-05 DIAGNOSIS — S83.241A TEAR OF MEDIAL MENISCUS OF RIGHT KNEE, UNSPECIFIED TEAR TYPE, UNSPECIFIED WHETHER OLD OR CURRENT TEAR, INITIAL ENCOUNTER: ICD-10-CM

## 2024-11-05 RX ORDER — TRAMADOL HYDROCHLORIDE 50 MG/1
TABLET ORAL
Qty: 240 TABLET | Refills: 0 | Status: SHIPPED | OUTPATIENT
Start: 2024-11-05

## 2024-11-11 ENCOUNTER — TELEPHONE (OUTPATIENT)
Dept: FAMILY MEDICINE CLINIC | Facility: CLINIC | Age: 54
End: 2024-11-11

## 2024-11-11 DIAGNOSIS — M47.816 OSTEOARTHRITIS OF LUMBAR SPINE, UNSPECIFIED SPINAL OSTEOARTHRITIS COMPLICATION STATUS: ICD-10-CM

## 2024-11-11 DIAGNOSIS — G89.29 CHRONIC PAIN OF RIGHT KNEE: ICD-10-CM

## 2024-11-11 DIAGNOSIS — M25.561 CHRONIC PAIN OF RIGHT KNEE: ICD-10-CM

## 2024-11-11 DIAGNOSIS — M70.61 TROCHANTERIC BURSITIS OF RIGHT HIP: ICD-10-CM

## 2024-11-11 DIAGNOSIS — S83.511A RUPTURE OF ANTERIOR CRUCIATE LIGAMENT OF RIGHT KNEE, INITIAL ENCOUNTER: ICD-10-CM

## 2024-11-11 DIAGNOSIS — S83.241A TEAR OF MEDIAL MENISCUS OF RIGHT KNEE, UNSPECIFIED TEAR TYPE, UNSPECIFIED WHETHER OLD OR CURRENT TEAR, INITIAL ENCOUNTER: ICD-10-CM

## 2024-11-11 DIAGNOSIS — G89.4 CHRONIC PAIN SYNDROME: ICD-10-CM

## 2024-11-11 RX ORDER — OXYCODONE AND ACETAMINOPHEN 10; 325 MG/1; MG/1
1 TABLET ORAL EVERY 4 HOURS PRN
Qty: 90 TABLET | Refills: 0 | Status: SHIPPED | OUTPATIENT
Start: 2024-11-11 | End: 2024-11-11

## 2024-11-11 RX ORDER — OXYCODONE AND ACETAMINOPHEN 10; 325 MG/1; MG/1
1 TABLET ORAL EVERY 4 HOURS PRN
Qty: 90 TABLET | Refills: 0 | Status: SHIPPED | OUTPATIENT
Start: 2024-11-11

## 2024-11-11 NOTE — TELEPHONE ENCOUNTER
Patient called and said she needs a refill on oxyCODONE-acetaminophen  MG Oral Tab  sent to [facility] - [place], [place] - [address] [phone], [phone].

## 2024-11-11 NOTE — TELEPHONE ENCOUNTER
Per TE from earlier today, Rx should go to [facility]    11/11/24  9:06 AM  Note     Patient called and said she needs a refill on oxyCODONE-acetaminophen  MG Oral Tab  sent to [facility] - [place], [place] - [address] [phone], [phone].

## 2024-11-13 NOTE — TELEPHONE ENCOUNTER
Patient Name: Donnie Bishop  : 1976    MRN: [id number]                              Today's Date: 2024       Admit Date: 10/22/2024    Visit Dx:     ICD-10-CM ICD-9-CM   1. Epigastric abdominal pain  R10.13 789.06   2. Acute gastritis without hemorrhage, unspecified gastritis type  K29.00 535.00   3. Calculus of gallbladder with acute on chronic cholecystitis without obstruction  K80.12 574.00     574.10   4. Anxiety  F41.9 300.00     Patient Active Problem List   Diagnosis    Multiple-type hyperlipidemia    Type 2 diabetes mellitus with diabetic neuropathy, with long-term current use of insulin    Neuropathy    Vitamin D deficiency    Benign essential HTN    Inability to attain erection    Injury of acoustic nerve    Hypogonadism in male    Anxiety    Chest pain    New onset of congestive heart failure    NSTEMI, initial episode of care    Diabetes mellitus type 1.5, managed as type 1    Chronic combined systolic and diastolic heart failure    Gastritis    Leukocytosis    Epigastric abdominal pain    Abscess of liver    Klebsiella pneumoniae (k. pneumoniae) as the cause of diseases classified elsewhere    Hypotension    Transaminitis    Calculus of gallbladder with acute on chronic cholecystitis without obstruction     Past Medical History:   Diagnosis Date    Anxiety     CHF (congestive heart failure)     Cough     Inability to attain erection     Injury of acoustic nerve     Myalgia     Type II diabetes mellitus     Venous insufficiency     Viral illness     Vitamin D deficiency      Past Surgical History:   Procedure Laterality Date    CARDIAC CATHETERIZATION N/A 2023    Procedure: Left Heart Cath;  Surgeon: Kaylee Kay MD;  Location:  [facility] CATH INVASIVE LOCATION;  Service: Cardiology;  Laterality: N/A;    CARDIAC CATHETERIZATION N/A 2023    Procedure: Coronary angiography;  Surgeon: Kaylee Kay MD;  Location: [facility] CATH INVASIVE LOCATION;  Service: Cardiology;  Laterality: N/A;     Requesting Norco 10/325mg  LOV: 11/10/22  RTC: prn  Last Relevant Labs: 9/24/19  Filled: 1/5/22 #90 with 0 refills    No future appointments.     Rx pended and routed for approval/denial CHOLECYSTECTOMY WITH INTRAOPERATIVE CHOLANGIOGRAM N/A 11/8/2024    Procedure: LAPAROSCOPIC CONVERTED TO OPEN CHOLECYSTECTOMY WITH INTRAOPERATIVE CHOLANGIOGRAM AND RIGHT UPPER QUADRANT DRAIN PLACEMENT;  Surgeon: Manuela Wisdom MD;  Location: [facility] OR;  Service: General;  Laterality: N/A;    ENDOSCOPY N/A 10/24/2024    Procedure: ESOPHAGOGASTRODUODENOSCOPY with biopsies;  Surgeon: Elma Wiggins MD;  Location: [facility] ENDOSCOPY;  Service: Gastroenterology;  Laterality: N/A;  pre-abdominal pain  post-normal      General Information       Row Name 11/13/24 1447          Physical Therapy Time and Intention    Document Type therapy note (daily note)  -EJ     Mode of Treatment physical therapy  -EJ               User Key  (r) = Recorded By, (t) = Taken By, (c) = Cosigned By      Initials Name Provider Type    EJ Kanika Wooten, PT Physical Therapist                   Mobility       Row Name 11/13/24 1447          Bed Mobility    Supine-Sit [place] (Bed Mobility) verbal cues;contact guard  -EJ     Sit-Supine [place] (Bed Mobility) verbal cues;contact guard  -EJ     Assistive Device (Bed Mobility) head of bed elevated;bed rails  -EJ     Comment, (Bed Mobility) increased time needed to initiate movement  -EJ       Row Name 11/13/24 1447          Sit-Stand Transfer    Sit-Stand [place] (Transfers) standby assist;contact guard  -EJ     Assistive Device (Sit-Stand Transfers) walker, front-wheeled  -EJ       Row Name 11/13/24 1447          Gait/Stairs (Locomotion)    [place] Level (Gait) verbal cues;standby assist;contact guard  -EJ     Assistive Device (Gait) walker, front-wheeled  -EJ     Distance in Feet (Gait) 150  -EJ     Deviations/Abnormal Patterns (Gait) rudy decreased;stride length decreased  -EJ     Comment, (Gait/Stairs) no unsteadiness noted  -EJ               User Key  (r) = Recorded By, (t) = Taken By, (c) = Cosigned By      Initials Name Provider Type    Kanika Ruiz,  PT Physical Therapist                   Obj/Interventions    No documentation.                  Goals/Plan    No documentation.                  Clinical Impression       Row Name 11/13/24 1447          Pain    Pretreatment Pain Rating 8/10  -EJ     Posttreatment Pain Rating 8/10  -EJ     Pain Location abdomen  -EJ       Row Name 11/13/24 1447          Plan of Care Review    Plan of Care Reviewed With patient  -EJ     Progress improving  -EJ     Outcome Evaluation Pt seen for PT this afternoon. HE is agreeable and seems to be doing better. Pt states he is ready to go home. Pt able to transition to EOB w CGA. He stood w SBA/CGA using Rwx and then able to ambulate approx 150 ft w SBA/CGA. Pt's pace is improving and no unsteadiness noted. He returned to bed at end of session w all needs in reach. Possible DC home tomorrow w family . Will continue to follow  -EJ       Row Name 11/13/24 1447          Positioning and Restraints    Pre-Treatment Position in bed  -EJ     Post Treatment Position bed  -EJ     In Bed notified nsg;supine;call light within reach;encouraged to call for assist;exit alarm on  -EJ               User Key  (r) = Recorded By, (t) = Taken By, (c) = Cosigned By      Initials Name Provider Type    EJ Kanika Wooten, PT Physical Therapist                   Outcome Measures       Row Name 11/13/24 1449          How much help from another person do you currently need...    Turning from your back to your side while in flat bed without using bedrails? 4  -EJ     Moving from lying on back to sitting on the side of a flat bed without bedrails? 3  -EJ     Moving to and from a bed to a chair (including a wheelchair)? 3  -EJ     Standing up from a chair using your arms (e.g., wheelchair, bedside chair)? 3  -EJ     Climbing 3-5 steps with a railing? 3  -EJ     To walk in hospital room? 3  -EJ     AM-PAC 6 Clicks Score (PT) 19  -EJ     Highest Level of Mobility Goal 6 --> Walk 10 steps or more  -EJ                User Key  (r) = Recorded By, (t) = Taken By, (c) = Cosigned By      Initials Name Provider Type    Kanika Ruiz, PT Physical Therapist                                 Physical Therapy Education       Title: PT OT SLP Therapies (Done)       Topic: Physical Therapy (Done)       Point: Mobility training (Done)       Learning Progress Summary            Patient Acceptance, E,D, DU by  at 11/10/2024 0956    Acceptance, E, VU by  at 11/1/2024 1148    Acceptance, TB,E, NR by  at 10/30/2024 1541    Acceptance, E,TB, VU by  at 10/30/2024 0506    Acceptance, E, VU,NR by  at 10/29/2024 1346    Acceptance, E,TB, NR by  at 10/26/2024 1436                      Point: Home exercise program (Done)       Learning Progress Summary            Patient Acceptance, E,TB, VU by  at 10/30/2024 0506                      Point: Body mechanics (Done)       Learning Progress Summary            Patient Acceptance, E,D, DU by  at 11/10/2024 0956    Acceptance, TB,E, NR by  at 10/30/2024 1541    Acceptance, E,TB, VU by  at 10/30/2024 0506    Acceptance, E,TB, NR by  at 10/26/2024 1436                      Point: Precautions (Done)       Learning Progress Summary            Patient Acceptance, E,D, DU by  at 11/10/2024 0956    Acceptance, E,TB, VU by  at 10/30/2024 0506                                      User Key       Initials Effective Dates Name Provider Type Discipline    PC 06/16/21 -  Monae Oreilly, PT Physical Therapist PT    EM 06/16/21 -  Shawna Akins, PT Physical Therapist PT    SV 07/11/23 -  Mahogany Horn, PT Physical Therapist PT    ST 09/22/22 -  Mary Weber, PT Physical Therapist PT    CS 09/22/22 -  Pam Vega, PT Physical Therapist PT     05/29/24 -  Kel Benson, RN Registered Nurse Nurse                  PT Recommendation and Plan     Progress: improving  Outcome Evaluation: Pt seen for PT this afternoon. HE is agreeable and seems to be doing better. Pt states he  is ready to go home. Pt able to transition to EOB w CGA. He stood w SBA/CGA using Rwx and then able to ambulate approx 150 ft w SBA/CGA. Pt's pace is improving and no unsteadiness noted. He returned to bed at end of session w all needs in reach. Possible DC home tomorrow w family . Will continue to follow     Time Calculation:         PT Charges       Row Name 11/13/24 1450             Time Calculation    Start Time 1345  -EJ      Stop Time 1410  -EJ      Time Calculation (min) 25 min  -EJ      PT Received On 11/13/24  -EJ      PT - Next Appointment 11/14/24  -EJ         Timed Charges    70676 - PT Therapeutic Activity Minutes 25  -EJ         Total Minutes    Timed Charges Total Minutes 25  -EJ       Total Minutes 25  -EJ                User Key  (r) = Recorded By, (t) = Taken By, (c) = Cosigned By      Initials Name Provider Type    EJ Kanika Wooten, PT Physical Therapist                  Therapy Charges for Today       Code Description Service Date Service Provider Modifiers Qty    [id number]  PT THERAPEUTIC ACT EA 15 MIN 11/13/2024 Kanika Wooten, PT GP 2            PT G-Codes  Outcome Measure Options: AM-PAC 6 Clicks Daily Activity (OT)  AM-PAC 6 Clicks Score (PT): 19  AM-PAC 6 Clicks Score (OT): 15  PT Discharge Summary  Anticipated Discharge Disposition (PT): home with assist, home with home health    Kanika Wooten, PT  11/13/2024

## 2024-11-25 ENCOUNTER — TELEMEDICINE (OUTPATIENT)
Dept: FAMILY MEDICINE CLINIC | Facility: CLINIC | Age: 54
End: 2024-11-25
Payer: COMMERCIAL

## 2024-11-25 DIAGNOSIS — M47.816 OSTEOARTHRITIS OF LUMBAR SPINE, UNSPECIFIED SPINAL OSTEOARTHRITIS COMPLICATION STATUS: Primary | ICD-10-CM

## 2024-11-25 DIAGNOSIS — G89.4 CHRONIC PAIN SYNDROME: ICD-10-CM

## 2024-11-25 RX ORDER — OXYCODONE AND ACETAMINOPHEN 10; 325 MG/1; MG/1
1-2 TABLET ORAL EVERY 4 HOURS PRN
Qty: 90 TABLET | Refills: 0 | Status: SHIPPED | OUTPATIENT
Start: 2024-11-25

## 2024-11-25 NOTE — PROGRESS NOTES
Subjective:   Patient ID: Leonie Wood is a 54 year old female.    HPI  Ms. Wood is a very pleasant 54-year-old female with multiple medical conditions which include but not limited to chronic pain secondary to osteoarthritis of the lumbar spine, lumbar DJD, history of rectocele status post repair, angioedema and hives requiring prednisone, hypertension, hyperlipidemia presenting for video visit today for back pain.  He was doing some house chores when she suddenly had back pain.  She has chronic back pain but for the past few days she has been experiencing lumbar pain which is bilateral.  No numbness or tingling.  She is requesting to have her pain medications refilled at this point.      I had reviewed past medical and family histories together with allergy and medication lists documented.        History/Other:   Review of Systems  Constitutional:  Negative for fatigue and fever.   Respiratory:  Negative for cough and shortness of breath.    Cardiovascular:  Negative for chest pain.   Gastrointestinal:  Negative for abdominal pain, diarrhea, nausea and vomiting.   Musculoskeletal:  Positive for arthralgias and back pain.   Neurological:   Negative for dizziness and numbness.       Current Outpatient Medications   Medication Sig Dispense Refill    oxyCODONE-acetaminophen  MG Oral Tab Take 1-2 tablets by mouth every 4 (four) hours as needed for Pain. Ok to fill today 90 tablet 0    traMADol 50 MG Oral Tab TAKE 1 TO 2 TABLETS ( MG TOTAL) BY MOUTH EVERY 4 TO 6 HOURS AS NEEDED FOR PAIN. 240 tablet 0    PREDNISONE 1 MG Oral Tab TAKE 1 TABLET BY MOUTH DAILY WITH BREAKFAST. 30 tablet 1    hydrALAZINE 25 MG Oral Tab Take 1 tablet (25 mg total) by mouth in the morning and 1 tablet (25 mg total) before bedtime. 60 tablet 1    metoprolol succinate  MG Oral Tablet 24 Hr Take 1 tablet (100 mg total) by mouth daily. 90 tablet 1    rosuvastatin 20 MG Oral Tab Take 1 tablet (20 mg total) by mouth  nightly. 90 tablet 0    metoprolol succinate ER 50 MG Oral Tablet 24 Hr Take 1 tablet (50 mg total) by mouth daily. 90 tablet 1    PREDNISONE 1 MG Oral Tab TAKE 4 TABLETS BY MOUTH DAILY FOR 7 DAYS, THEN 3 TABLETS DAILY FOR 7 DAYS, THEN 2 TABLETS DAILY FOR 14 DAYS. 90 tablet 0    EPINEPHrine 0.3 MG/0.3ML Injection Solution Auto-injector Inject 0.3 mL (1 each total) as directed one time.      LORazepam 1 MG Oral Tab Take 1 tablet (1 mg total) by mouth 2 (two) times daily as needed for Anxiety. (Patient not taking: Reported on 8/9/2024) 30 tablet 0    predniSONE 5 MG Oral Tab Take 1 tablet (5 mg total) by mouth every morning. (Patient not taking: Reported on 8/9/2024) 90 tablet 0    albuterol 108 (90 Base) MCG/ACT Inhalation Aero Soln Inhale 2 puffs into the lungs every 6 (six) hours as needed for Wheezing. 3 each 1    mupirocin 2 % External Ointment Apply 1 Application topically 3 (three) times daily. (Patient not taking: Reported on 8/9/2024) 15 g 0    predniSONE 10 MG Oral Tab TAKE 1 TABLET (10 MG TOTAL) BY MOUTH 2 (TWO) TIMES DAILY FOR 3 DAYS, THEN 1 TABLET (10 MG TOTAL) DAILY FOR 3 DAYS, THEN 1/2 TABLET (5 MG TOTAL) DAILY FOR 3 DAYS. (Patient not taking: Reported on 8/9/2024) 11 tablet 0    metoprolol succinate ER 25 MG Oral Tablet 24 Hr Take 1 tablet (25 mg total) by mouth daily. (Patient not taking: Reported on 1/31/2024) 30 tablet 1    CYCLOBENZAPRINE 10 MG Oral Tab TAKE 1 TABLET BY MOUTH NIGHTLY AS NEEDED FOR MUSCLE SPASMS 30 tablet 5    Ibuprofen 100 MG Oral Chew Tab       predniSONE 50 MG Oral Tab Take 1 tablet (50 mg total) by mouth as needed. For allergy flare-up (Patient not taking: Reported on 8/9/2024)      aspirin  MG Oral Tab EC TAKE ONE TABLET BY MOUTH ONE TIME DAILY POST OPERATIVELY      Fexofenadine HCl 180 MG Oral Tab Take 1 tablet (180 mg total) by mouth daily. (Patient not taking: Reported on 8/9/2024)      Naloxone HCl 4 MG/0.1ML Nasal Liquid 4 mg by Nasal route as needed. If patient  remains unresponsive, repeat dose in other nostril 2-5 minutes after first dose. (Patient not taking: Reported on 8/9/2024) 1 kit 0    famoTIDine 20 MG Oral Tab Take 1 tablet (20 mg total) by mouth 2 (two) times daily. (Patient not taking: Reported on 8/9/2024)      Ketotifen Fumarate (KETOTIFEN HYDROGEN FUMARATE) Does not apply Powder 2 mg. (Patient not taking: Reported on 8/9/2024)      psyllium 28 % Oral Powd Pack Take 1 packet by mouth as needed.      cetirizine 10 MG Oral Tab Take 1-2 tablets (10-20 mg total) by mouth daily.      caffeine 200 MG Oral Tab Take 1 tablet (200 mg total) by mouth daily. (Patient not taking: Reported on 8/9/2024)       Allergies:Allergies[1]    Objective:   Physical Exam  Constitutional:       General: She is not in acute distress.  Neurological:      Mental Status: She is alert.   Psychiatric:         Mood and Affect: Mood normal.         Behavior: Behavior normal.      Assessment & Plan:   1. Osteoarthritis of lumbar spine, unspecified spinal osteoarthritis complication status    2. Chronic pain syndrome    -Acute on chronic low back pain  - May apply ice or heat to affected area  - Rest and avoid heavy lifting for now  - Will renew Percocet which will be sent to her pharmacy of preference  - Call or come see if symptoms worsen or persist      This note was prepared using Dragon Medical voice recognition dictation software. As a result errors may occur. When identified these errors have been corrected. While every attempt is made to correct errors during dictation discrepancies may still exist            No orders of the defined types were placed in this encounter.      Meds This Visit:  Requested Prescriptions     Signed Prescriptions Disp Refills    oxyCODONE-acetaminophen  MG Oral Tab 90 tablet 0     Sig: Take 1-2 tablets by mouth every 4 (four) hours as needed for Pain. Ok to fill today       Imaging & Referrals:  None         [1]   Allergies  Allergen Reactions     Hydrochlorothiazide SWELLING    Latex ANAPHYLAXIS    Latex ANAPHYLAXIS and RASH    Morphine SWELLING     Legs swelling      Phentermine HIVES and SWELLING    Penicillin G RASH

## 2024-12-03 DIAGNOSIS — S83.511A RUPTURE OF ANTERIOR CRUCIATE LIGAMENT OF RIGHT KNEE, INITIAL ENCOUNTER: ICD-10-CM

## 2024-12-03 DIAGNOSIS — G89.4 CHRONIC PAIN SYNDROME: ICD-10-CM

## 2024-12-03 DIAGNOSIS — G89.29 CHRONIC PAIN OF RIGHT KNEE: ICD-10-CM

## 2024-12-03 DIAGNOSIS — M47.816 OSTEOARTHRITIS OF LUMBAR SPINE, UNSPECIFIED SPINAL OSTEOARTHRITIS COMPLICATION STATUS: ICD-10-CM

## 2024-12-03 DIAGNOSIS — M25.561 CHRONIC PAIN OF RIGHT KNEE: ICD-10-CM

## 2024-12-03 DIAGNOSIS — S83.241A TEAR OF MEDIAL MENISCUS OF RIGHT KNEE, UNSPECIFIED TEAR TYPE, UNSPECIFIED WHETHER OLD OR CURRENT TEAR, INITIAL ENCOUNTER: ICD-10-CM

## 2024-12-03 RX ORDER — TRAMADOL HYDROCHLORIDE 50 MG/1
TABLET ORAL
Qty: 240 TABLET | Refills: 0 | Status: SHIPPED | OUTPATIENT
Start: 2024-12-03

## 2024-12-12 DIAGNOSIS — G89.4 CHRONIC PAIN SYNDROME: ICD-10-CM

## 2024-12-12 RX ORDER — OXYCODONE AND ACETAMINOPHEN 10; 325 MG/1; MG/1
1-2 TABLET ORAL EVERY 4 HOURS PRN
Qty: 90 TABLET | Refills: 0 | Status: SHIPPED | OUTPATIENT
Start: 2024-12-12 | End: 2024-12-12

## 2024-12-12 RX ORDER — OXYCODONE AND ACETAMINOPHEN 10; 325 MG/1; MG/1
1-2 TABLET ORAL 3 TIMES DAILY PRN
Qty: 90 TABLET | Refills: 0 | Status: SHIPPED | OUTPATIENT
Start: 2024-12-12 | End: 2024-12-16

## 2024-12-12 NOTE — TELEPHONE ENCOUNTER
Requesting Oxycodone-Acetaminophen 10/325mg  Last OV: 11/25/24 Telemedicine  RTC: prn  Last Rx'd 11/25/24 #90 with 0 refills    Future Appointments   Date Time Provider Department Center   1/3/2025 11:00 AM Cole Garvin MD [facility] [address] [facility] [address]       Per [place] , last dispensed 11/25/24 #90 (8 day supply)    Ok for refill? Is 90 supposed to be a 30 day supply?

## 2024-12-12 NOTE — TELEPHONE ENCOUNTER
Patient calling stating that rx needs to be changed to state that she takes 1-2 tablets 3 times a day otherwise the insurance will not cover medication and patient will have to pay $200.00 out of pocket.     Please advise.

## 2024-12-12 NOTE — TELEPHONE ENCOUNTER
Patient calling again to request that the rx be changed otherwise the insurance will not cover it.     Patient is currently at the pharmacy.

## 2024-12-12 NOTE — TELEPHONE ENCOUNTER
Patient needs a refill on oxyCODONE-acetaminophen  MG Oral Tab  sent to [facility] #[id number] - [place], [place] - [address], [phone], [phone].  She said she is completely out of the medication.

## 2024-12-12 NOTE — TELEPHONE ENCOUNTER
oxyCODONE-acetaminophen  MG Oral Tab 90 tablet 0 11/25/2024 --    Sig - Route: Take 1-2 tablets by mouth every 4 (four) hours as needed for Pain. Ok to fill today - Oral    Sent to pharmacy as: oxyCODONE-Acetaminophen  MG Oral Tablet (Percocet)    Earliest Fill Date: 11/25/2024    E-Prescribing Status: Receipt confirmed by pharmacy (11/25/2024  4:00 PM CST)      RX pended. Please advise

## 2024-12-16 ENCOUNTER — TELEMEDICINE (OUTPATIENT)
Dept: FAMILY MEDICINE CLINIC | Facility: CLINIC | Age: 54
End: 2024-12-16
Payer: COMMERCIAL

## 2024-12-16 DIAGNOSIS — M47.816 OSTEOARTHRITIS OF LUMBAR SPINE, UNSPECIFIED SPINAL OSTEOARTHRITIS COMPLICATION STATUS: ICD-10-CM

## 2024-12-16 DIAGNOSIS — H57.89 EYE SWELLING, BILATERAL: Primary | ICD-10-CM

## 2024-12-16 DIAGNOSIS — J01.90 ACUTE SINUSITIS, RECURRENCE NOT SPECIFIED, UNSPECIFIED LOCATION: ICD-10-CM

## 2024-12-16 RX ORDER — AZITHROMYCIN 250 MG/1
TABLET, FILM COATED ORAL
Qty: 6 TABLET | Refills: 0 | Status: SHIPPED | OUTPATIENT
Start: 2024-12-16 | End: 2024-12-21

## 2024-12-16 RX ORDER — HYDROCODONE BITARTRATE AND ACETAMINOPHEN 10; 325 MG/1; MG/1
1-2 TABLET ORAL EVERY 8 HOURS PRN
Qty: 90 TABLET | Refills: 0 | Status: SHIPPED | OUTPATIENT
Start: 2024-12-16

## 2024-12-16 NOTE — PROGRESS NOTES
Subjective:   Patient ID: Leonie Wood is a 54 year old female.    HPI  Ms. Wood is a very pleasant 54-year-old female with multiple medical conditions which include but not limited to chronic pain secondary to osteoarthritis of the lumbar spine, lumbar DJD, history of rectocele status post repair, angioedema and hives requiring prednisone, hypertension, hyperlipidemia presenting for video visit today for bilateral eyelid swelling associate with throat swelling for the past few days.  She thinks that it could have been from Percocet which we had prescribed for chronic pain.  Apparently in the past she has had eye swelling with this and throat swelling.  She also has been experiencing cough which is slightly productive of phlegm.  Her family members have similar symptoms.  No fever no chest pain no shortness of breath no nausea no vomiting no abdominal pain.  She requests Percocet to be changed back to Norco which she has had in the past.    I had reviewed past medical and family histories together with allergy and medication lists documented.    History/Other:   Review of Systems   Constitutional:  Negative for fatigue and fever.   HENT:  Positive for congestion, postnasal drip and sore throat.    Eyes:  Negative for visual disturbance.   Respiratory:  Negative for shortness of breath.    Cardiovascular:  Negative for chest pain.   Gastrointestinal:  Negative for abdominal pain, nausea and vomiting.   Musculoskeletal:  Positive for arthralgias and back pain.   Neurological:  Negative for dizziness.     Current Outpatient Medications   Medication Sig Dispense Refill    azithromycin (ZITHROMAX Z-HERRERA) 250 MG Oral Tab Take 2 tablets (500 mg total) by mouth daily for 1 day, THEN 1 tablet (250 mg total) daily for 4 days. 6 tablet 0    HYDROcodone-acetaminophen (NORCO)  MG Oral Tab Take 1-2 tablets by mouth every 8 (eight) hours as needed for Pain. Ok to refill early Had an allergic reaction to Percocet  (Eye swelling) 90 tablet 0    TRAMADOL 50 MG Oral Tab TAKE 1 TO 2 TABLETS ( MG TOTAL) BY MOUTH EVERY 4 TO 6 HOURS AS NEEDED FOR PAIN. 240 tablet 0    PREDNISONE 1 MG Oral Tab TAKE 1 TABLET BY MOUTH DAILY WITH BREAKFAST. 30 tablet 1    hydrALAZINE 25 MG Oral Tab Take 1 tablet (25 mg total) by mouth in the morning and 1 tablet (25 mg total) before bedtime. 60 tablet 1    metoprolol succinate  MG Oral Tablet 24 Hr Take 1 tablet (100 mg total) by mouth daily. 90 tablet 1    rosuvastatin 20 MG Oral Tab Take 1 tablet (20 mg total) by mouth nightly. 90 tablet 0    metoprolol succinate ER 50 MG Oral Tablet 24 Hr Take 1 tablet (50 mg total) by mouth daily. 90 tablet 1    PREDNISONE 1 MG Oral Tab TAKE 4 TABLETS BY MOUTH DAILY FOR 7 DAYS, THEN 3 TABLETS DAILY FOR 7 DAYS, THEN 2 TABLETS DAILY FOR 14 DAYS. 90 tablet 0    EPINEPHrine 0.3 MG/0.3ML Injection Solution Auto-injector Inject 0.3 mL (1 each total) as directed one time.      LORazepam 1 MG Oral Tab Take 1 tablet (1 mg total) by mouth 2 (two) times daily as needed for Anxiety. (Patient not taking: Reported on 8/9/2024) 30 tablet 0    predniSONE 5 MG Oral Tab Take 1 tablet (5 mg total) by mouth every morning. (Patient not taking: Reported on 8/9/2024) 90 tablet 0    albuterol 108 (90 Base) MCG/ACT Inhalation Aero Soln Inhale 2 puffs into the lungs every 6 (six) hours as needed for Wheezing. 3 each 1    mupirocin 2 % External Ointment Apply 1 Application topically 3 (three) times daily. (Patient not taking: Reported on 8/9/2024) 15 g 0    predniSONE 10 MG Oral Tab TAKE 1 TABLET (10 MG TOTAL) BY MOUTH 2 (TWO) TIMES DAILY FOR 3 DAYS, THEN 1 TABLET (10 MG TOTAL) DAILY FOR 3 DAYS, THEN 1/2 TABLET (5 MG TOTAL) DAILY FOR 3 DAYS. (Patient not taking: Reported on 8/9/2024) 11 tablet 0    metoprolol succinate ER 25 MG Oral Tablet 24 Hr Take 1 tablet (25 mg total) by mouth daily. (Patient not taking: Reported on 1/31/2024) 30 tablet 1    CYCLOBENZAPRINE 10 MG Oral Tab  TAKE 1 TABLET BY MOUTH NIGHTLY AS NEEDED FOR MUSCLE SPASMS 30 tablet 5    Ibuprofen 100 MG Oral Chew Tab       predniSONE 50 MG Oral Tab Take 1 tablet (50 mg total) by mouth as needed. For allergy flare-up (Patient not taking: Reported on 8/9/2024)      aspirin  MG Oral Tab EC TAKE ONE TABLET BY MOUTH ONE TIME DAILY POST OPERATIVELY      Fexofenadine HCl 180 MG Oral Tab Take 1 tablet (180 mg total) by mouth daily. (Patient not taking: Reported on 8/9/2024)      Naloxone HCl 4 MG/0.1ML Nasal Liquid 4 mg by Nasal route as needed. If patient remains unresponsive, repeat dose in other nostril 2-5 minutes after first dose. (Patient not taking: Reported on 8/9/2024) 1 kit 0    famoTIDine 20 MG Oral Tab Take 1 tablet (20 mg total) by mouth 2 (two) times daily. (Patient not taking: Reported on 8/9/2024)      Ketotifen Fumarate (KETOTIFEN HYDROGEN FUMARATE) Does not apply Powder 2 mg. (Patient not taking: Reported on 8/9/2024)      psyllium 28 % Oral Powd Pack Take 1 packet by mouth as needed.      cetirizine 10 MG Oral Tab Take 1-2 tablets (10-20 mg total) by mouth daily.      caffeine 200 MG Oral Tab Take 1 tablet (200 mg total) by mouth daily. (Patient not taking: Reported on 8/9/2024)       Allergies:Allergies[1]    Objective:   Physical Exam  Constitutional:       General: She is not in acute distress.  HENT:      Head:      Comments: Noticeable swelling around both eyes.  Neurological:      Mental Status: She is alert.         Assessment & Plan:   1. Eye swelling, bilateral   -May be due to combination of sinusitis and allergic reaction  - May take Benadryl over-the-counter as needed  -Will discontinue Percocet and will change back to Norco for pain management  -Go to the emergency room if symptoms worsen and/or if she develops respiratory distress   2. Osteoarthritis of lumbar spine, unspecified spinal osteoarthritis complication status   -Will restart back on Norco as directed   3. Acute sinusitis, recurrence  not specified, unspecified location   -Will treat with Z-Herrera which was sent to her pharmacy  - Keep hydrated  - May take Tylenol as needed for fever pain  - Call or come in sooner if symptoms worsen or persist     This note was prepared using Dragon Medical voice recognition dictation software. As a result errors may occur. When identified these errors have been corrected. While every attempt is made to correct errors during dictation discrepancies may still exist          No orders of the defined types were placed in this encounter.      Meds This Visit:  Requested Prescriptions     Signed Prescriptions Disp Refills    azithromycin (ZITHROMAX Z-HERRERA) 250 MG Oral Tab 6 tablet 0     Sig: Take 2 tablets (500 mg total) by mouth daily for 1 day, THEN 1 tablet (250 mg total) daily for 4 days.    HYDROcodone-acetaminophen (NORCO)  MG Oral Tab 90 tablet 0     Sig: Take 1-2 tablets by mouth every 8 (eight) hours as needed for Pain. Ok to refill early Had an allergic reaction to Percocet (Eye swelling)       Imaging & Referrals:  None         [1]   Allergies  Allergen Reactions    Hydrochlorothiazide SWELLING    Latex ANAPHYLAXIS    Latex ANAPHYLAXIS and RASH    Morphine SWELLING     Legs swelling      Percocet [Oxycodone-Acetaminophen] SWELLING    Phentermine HIVES and SWELLING    Penicillin G RASH

## 2024-12-17 ENCOUNTER — TELEPHONE (OUTPATIENT)
Dept: FAMILY MEDICINE CLINIC | Facility: CLINIC | Age: 54
End: 2024-12-17

## 2024-12-17 NOTE — TELEPHONE ENCOUNTER
Pharmacy calling, received prescription for Norco. Need clarification in order to fill prescription.

## 2024-12-17 NOTE — TELEPHONE ENCOUNTER
Spoke to [facility] pharmacy, states they received Rx for Norco yesterday. I advised patient had appointment yesterday, allergic reaction noted to Percocet so Rx was changed to [place]. Pharmacy states that patient has been getting Percocet every month for over a year and never noted an allergic reaction. Pharmacy also noted that patient gets #240 of Tramadol filled at another pharmacy, advised pharmacist we are aware of this. Due to [facility] policy, pharmacist states they will NOT dispense Randolph script at this [facility] or any other [facility]. Patient will need Rx sent to a different pharmacy if she wants Rx filled.

## 2025-01-02 DIAGNOSIS — G89.29 CHRONIC PAIN OF RIGHT KNEE: ICD-10-CM

## 2025-01-02 DIAGNOSIS — M47.816 OSTEOARTHRITIS OF LUMBAR SPINE, UNSPECIFIED SPINAL OSTEOARTHRITIS COMPLICATION STATUS: ICD-10-CM

## 2025-01-02 DIAGNOSIS — S83.241A TEAR OF MEDIAL MENISCUS OF RIGHT KNEE, UNSPECIFIED TEAR TYPE, UNSPECIFIED WHETHER OLD OR CURRENT TEAR, INITIAL ENCOUNTER: ICD-10-CM

## 2025-01-02 DIAGNOSIS — G89.4 CHRONIC PAIN SYNDROME: ICD-10-CM

## 2025-01-02 DIAGNOSIS — S83.511A RUPTURE OF ANTERIOR CRUCIATE LIGAMENT OF RIGHT KNEE, INITIAL ENCOUNTER: ICD-10-CM

## 2025-01-02 DIAGNOSIS — M25.561 CHRONIC PAIN OF RIGHT KNEE: ICD-10-CM

## 2025-01-02 RX ORDER — TRAMADOL HYDROCHLORIDE 50 MG/1
TABLET ORAL
Qty: 240 TABLET | Refills: 0 | Status: SHIPPED | OUTPATIENT
Start: 2025-01-02

## 2025-01-02 RX ORDER — TRAMADOL HYDROCHLORIDE 50 MG/1
TABLET ORAL
Qty: 240 TABLET | Refills: 0 | OUTPATIENT
Start: 2025-01-02

## 2025-01-02 NOTE — TELEPHONE ENCOUNTER
Patient states that she is not allergic to Tramadol-she is allergic to Percoset. Please refill the Tramadol.

## 2025-01-02 NOTE — TELEPHONE ENCOUNTER
Per visit note on 12/16/24, patient noted an allergic reaction to Tramadol and was changed to Norco.    Request denied.

## 2025-01-03 ENCOUNTER — OFFICE VISIT (OUTPATIENT)
Dept: FAMILY MEDICINE CLINIC | Facility: CLINIC | Age: 55
End: 2025-01-03
Payer: COMMERCIAL

## 2025-01-03 VITALS
SYSTOLIC BLOOD PRESSURE: 122 MMHG | WEIGHT: 249 LBS | RESPIRATION RATE: 16 BRPM | HEIGHT: 69 IN | TEMPERATURE: 97 F | DIASTOLIC BLOOD PRESSURE: 82 MMHG | OXYGEN SATURATION: 97 % | HEART RATE: 71 BPM | BODY MASS INDEX: 36.88 KG/M2

## 2025-01-03 DIAGNOSIS — Z00.00 WELLNESS EXAMINATION: Primary | ICD-10-CM

## 2025-01-03 DIAGNOSIS — Z12.11 COLON CANCER SCREENING: ICD-10-CM

## 2025-01-03 DIAGNOSIS — M47.816 OSTEOARTHRITIS OF LUMBAR SPINE, UNSPECIFIED SPINAL OSTEOARTHRITIS COMPLICATION STATUS: ICD-10-CM

## 2025-01-03 DIAGNOSIS — Z01.419 WELL WOMAN EXAM WITH ROUTINE GYNECOLOGICAL EXAM: ICD-10-CM

## 2025-01-03 DIAGNOSIS — Z12.39 ENCOUNTER FOR BREAST CANCER SCREENING USING NON-MAMMOGRAM MODALITY: ICD-10-CM

## 2025-01-03 PROBLEM — G89.18 POST-OP PAIN: Status: RESOLVED | Noted: 2020-07-15 | Resolved: 2025-01-03

## 2025-01-03 PROBLEM — I10 PRIMARY HYPERTENSION: Status: ACTIVE | Noted: 2025-01-03

## 2025-01-03 PROBLEM — M62.89 PELVIC FLOOR TENSION: Status: RESOLVED | Noted: 2020-07-15 | Resolved: 2025-01-03

## 2025-01-03 PROBLEM — E78.5 HYPERLIPIDEMIA: Status: ACTIVE | Noted: 2025-01-03

## 2025-01-03 PROBLEM — R07.9 CHEST PAIN: Status: RESOLVED | Noted: 2020-02-19 | Resolved: 2025-01-03

## 2025-01-03 PROBLEM — Z98.890 POST-OPERATIVE STATE: Status: RESOLVED | Noted: 2020-05-26 | Resolved: 2025-01-03

## 2025-01-03 RX ORDER — HYDROCODONE BITARTRATE AND ACETAMINOPHEN 10; 325 MG/1; MG/1
1-2 TABLET ORAL EVERY 8 HOURS PRN
Qty: 90 TABLET | Refills: 0 | Status: SHIPPED | OUTPATIENT
Start: 2025-01-03

## 2025-01-03 RX ORDER — IBUPROFEN 200 MG
200 TABLET ORAL EVERY 6 HOURS PRN
COMMUNITY

## 2025-01-03 NOTE — PROGRESS NOTES
Wellness Exam    REASON FOR VISIT:    Leonie Wood is a 54 year old female who presents for an Annual Health Assessment.    Current Complaints: Ms. Wood is here for her wellness exam  Flu shot: see immunization record  Health Maintenance Topics with due status: Overdue       Topic Date Due    Colorectal Cancer Screening Never done    Mammogram Never done    Pap Smear Never done    DTaP,Tdap,and Td Vaccines 04/27/2018    Zoster Vaccines Never done    Annual Physical 02/01/2024    COVID-19 Vaccine Never done    Influenza Vaccine 10/01/2024    Annual Depression Screening 01/01/2025     Reported Health:   Ms. Wood is a very pleasant 54-year-old female with multiple medical conditions which include but not limited to chronic pain secondary to osteoarthritis of the lumbar spine, lumbar DJD, history of rectocele status post repair, angioedema and hives requiring prednisone, hypertension, hyperlipidemia here for her wellness exam.    She had lowered her dose of metoprolol and blood pressure remains to be normal.  Blood pressure was previously elevated due to prednisone which she was taking for her hives and chronic urticaria.  She is off prednisone at this point.    She has been doing well otherwise except that she continues to have chronic pain.  This is secondary to her lumbar DJD and arthritis of the knee.  She will need refills for Norco which has been effective for her.    I had reviewed past medical and family histories together with allergy and medication lists documented.    Details about the complaints:  As abovementioned    General Health                                   At any time do you feel concerned for the safety/well-being of yourself and/or your children, in your home or elsewhere?: No     CAGE:                                 PHQ-4: Over the LAST 2 WEEKS       Depression Screening (PHQ-2/PHQ-9): Over the LAST 2 WEEKS                            PREVENTATIVE SERVICES  INDICATIONS AND  SCHEDULE Recommendation Internal Lab or Procedure External Lab or Procedure   Breast Cancer Screening   Every 2 yrs age 50-74 Health Maintenance   Topic Date Due    Mammogram  Never done       Pap Every 3 yrs age 21-65 or Pap and HPV every 5 yrs age 30-65 Health Maintenance   Topic Date Due    Pap Smear  Never done       Chlamydia Screening Screen Annually age<25, if sex active/on OCPs; >24 high risk No results found for: \"CHLAMYDIA\"    Colonoscopy Screen Every 10 years Health Maintenance   Topic Date Due    Colorectal Cancer Screening  Never done       Flex Sigmoidoscopy Screen  Every 5 years No results found for this or any previous visit.    Fecal Occult Blood  Annually No results found for: \"FOB\", \"OCCULTSTOOL\"    Obesity Screening Screen all adults annually Body mass index is 36.77 kg/m².      Preventive Services for Which Recommendations Vary with Risk Recommendation Internal Lab or Procedure External Lab or Procedure   Cholesterol Screening Recommended screening varies with age, risk and gender LDL Cholesterol (mg/dL)   Date Value   08/09/2024 171 (H)     LDL-CHOLESTEROL (mg/dL (calc))   Date Value   07/25/2023 158 (H)       Diabetes Screening  if history of high blood pressure or other  risk factors HgbA1C (%)   Date Value   08/09/2024 5.3     Glucose (mg/dL)   Date Value   08/09/2024 105 (H)     GLUCOSE (mg/dL)   Date Value   07/25/2023 92         Gonorrhea Screening if high risk No results found for: \"GONOCOCCUS\"    HIV Screening For all adults age 18-65, older adults at increased risk No results found for: \"HIV\"    Syphilis Screening Screen if pregnant or high risk No results found for: \"RPR\"    Hepatitis C Screening Screen those at high risk plus screen one time for adults born 1944-1 965 No results found for: \"HCVAB\"    Tuberculosis Screen if high risk No components found for: \"PPDINDURAT\"      ALLERGIES:   Allergies[1]    CURRENT MEDICATIONS:   Current Outpatient Medications   Medication Sig Dispense  Refill    ibuprofen 200 MG Oral Tab Take 1 tablet (200 mg total) by mouth every 6 (six) hours as needed for Pain.      HYDROcodone-acetaminophen (NORCO)  MG Oral Tab Take 1-2 tablets by mouth every 8 (eight) hours as needed for Pain. Ok to refill early Had an allergic reaction to Percocet (Eye swelling) 90 tablet 0    traMADol 50 MG Oral Tab TAKE 1 TO 2 TABLETS ( MG TOTAL) BY MOUTH EVERY 4 TO 6 HOURS AS NEEDED FOR PAIN. 240 tablet 0    hydrALAZINE 25 MG Oral Tab Take 1 tablet (25 mg total) by mouth in the morning and 1 tablet (25 mg total) before bedtime. 60 tablet 1    metoprolol succinate ER 50 MG Oral Tablet 24 Hr Take 1 tablet (50 mg total) by mouth daily. 90 tablet 1    EPINEPHrine 0.3 MG/0.3ML Injection Solution Auto-injector Inject 0.3 mL (1 each total) as directed one time.      LORazepam 1 MG Oral Tab Take 1 tablet (1 mg total) by mouth 2 (two) times daily as needed for Anxiety. 30 tablet 0    albuterol 108 (90 Base) MCG/ACT Inhalation Aero Soln Inhale 2 puffs into the lungs every 6 (six) hours as needed for Wheezing. 3 each 1    CYCLOBENZAPRINE 10 MG Oral Tab TAKE 1 TABLET BY MOUTH NIGHTLY AS NEEDED FOR MUSCLE SPASMS 30 tablet 5    aspirin  MG Oral Tab EC TAKE ONE TABLET BY MOUTH ONE TIME DAILY POST OPERATIVELY      Naloxone HCl 4 MG/0.1ML Nasal Liquid 4 mg by Nasal route as needed. If patient remains unresponsive, repeat dose in other nostril 2-5 minutes after first dose. 1 kit 0    famoTIDine 20 MG Oral Tab Take 1 tablet (20 mg total) by mouth 2 (two) times daily.      psyllium 28 % Oral Powd Pack Take 1 packet by mouth as needed.      caffeine 200 MG Oral Tab Take 1 tablet (200 mg total) by mouth daily.      rosuvastatin 20 MG Oral Tab Take 1 tablet (20 mg total) by mouth nightly. (Patient not taking: Reported on 1/3/2025) 90 tablet 0    predniSONE 50 MG Oral Tab Take 1 tablet (50 mg total) by mouth as needed. For allergy flare-up (Patient not taking: Reported on 1/3/2025)       Fexofenadine HCl 180 MG Oral Tab Take 1 tablet (180 mg total) by mouth daily. (Patient not taking: Reported on 1/3/2025)      Ketotifen Fumarate (KETOTIFEN HYDROGEN FUMARATE) Does not apply Powder 2 mg. (Patient not taking: Reported on 1/3/2025)      cetirizine 10 MG Oral Tab Take 1-2 tablets (10-20 mg total) by mouth daily. (Patient not taking: Reported on 1/3/2025)        MEDICAL INFORMATION:   Past Medical History:    Allergic rhinitis    On file    Back problem    Blood disorder    Factor V Liden    Essential hypertension    Noted in file    Migraines    Osteoarthritis    Visual impairment    glasses      Past Surgical History:   Procedure Laterality Date    Foot fracture surgery Right     Hernia surgery  On file    On file      1991    Other surgical history  On file    On file      Family History   Problem Relation Age of Onset    Cancer Father         . 2022    Thyroid Disorder Mother     Other (Other) Mother         copd    Bleeding Disorders Daughter         Factor 5 laiden      SOCIAL HISTORY:   Social History     Socioeconomic History    Marital status:    Tobacco Use    Smoking status: Never     Passive exposure: Never    Smokeless tobacco: Never   Vaping Use    Vaping status: Never Used   Substance and Sexual Activity    Alcohol use: Not Currently     Comment: ocassionally     Drug use: Never    Sexual activity: Yes     Partners: Male   Other Topics Concern    Caffeine Concern Yes    Stress Concern No    Weight Concern Yes     Comment: Increased pressure on low back and right knee    Special Diet No    Exercise Yes    Seat Belt Yes          REVIEW OF SYSTEMS:   Constitutional: Negative for fever, chills and fatigue.   HENT: Negative for hearing loss, congestion, sore throat and neck pain.    Eyes: Negative for pain and visual disturbance.   Respiratory: Negative for cough and shortness of breath.    Cardiovascular: Negative for chest pain and palpitations.    Gastrointestinal: Negative for nausea, vomiting, abdominal pain and diarrhea.   Genitourinary: Negative for urgency, frequency and difficulty urinating.   Musculoskeletal: Negative for gait problem.   Skin: Negative for color change and rash.   Neurological: Negative for tremors, weakness and numbness.   Hematological: Negative for adenopathy. Does not bruise/bleed easily.   Psychiatric/Behavioral: Negative for confusion and agitation. The patient is not nervous/anxious.    EXAM:   /82   Pulse 71   Temp 97.4 °F (36.3 °C) (Temporal)   Resp 16   Ht 5' 9\" (1.753 m)   Wt 249 lb (112.9 kg)   LMP 04/03/2024 (Exact Date)   SpO2 97%   BMI 36.77 kg/m²    Patient's last menstrual period was 04/03/2024 (exact date).   Constitutional: She appears her stated age, nourished, and pleasant. Vital signs reviewed as noted  Head: Normocephalic and atraumatic.   Nose: Nose normal.   Eyes: EOM are normal. Pupils are equal, round, and reactive to light. No scleral icterus.   Neck: Normal range of motion. No thyromegaly present.   Cardiovascular: Normal rate, regular rhythm and normal heart sounds.  Exam reveals no friction rub.    No murmur heard.  Pulmonary/Chest: Effort normal and breath sounds normal. She has no wheezes. She has no rales.   Abdominal: Soft. Bowel sounds are normal. There is no tenderness.   Musculoskeletal: Normal range of motion. She exhibits no edema.   Lymphadenopathy:    She has no cervical adenopathy or supraclavicular adenopathy.   Neurological: She is alert and oriented to person, place, and time. She has normal reflexes.   Skin: Skin is warm. No rash noted. No erythema. with normal hair  Psychiatric: She has a normal mood and affect and her behavior is normal.     ASSESSMENT AND OTHER RELEVANT CHRONIC CONDITIONS:   Leonie Wood is a 54 year old female who presents for an Annual Health Assessment.   1. Wellness examination    2. Visit for screening mammogram    3. Colon cancer screening     4. Well woman exam with routine gynecological exam    5. Osteoarthritis of lumbar spine, unspecified spinal osteoarthritis complication status        PLAN SUMMARY:   Leonie Wood is a 54 year old female  Age appropriate cancer screening, labs, safety, immunizations were discussed with the patient and ordered as follows:    Leonie was seen today for well adult.    Diagnoses and all orders for this visit:    Wellness examination  -     CBC With Differential With Platelet  -     Comp Metabolic Panel (14)  -     Lipid Panel  -     TSH and Free T4 [E]; Future    Visit for screening mammogram  -     3D Mammogram Digital Screen, Bilateral (CPT=77067/28962); Future    Colon cancer screening  -     COLOGUARD COLON CANCER SCREENING (EXTERNAL)    Well woman exam with routine gynecological exam    Osteoarthritis of lumbar spine, unspecified spinal osteoarthritis complication status  -     HYDROcodone-acetaminophen (NORCO)  MG Oral Tab; Take 1-2 tablets by mouth every 8 (eight) hours as needed for Pain. Ok to refill early Had an allergic reaction to Percocet (Eye swelling)    I would refer her to an OB/GYN where she lives.  She lives in the [facility].    We shall check routine labs and notify her once we get test results.    Plan as above mention.  This note was prepared using Dragon Medical voice recognition dictation software. As a result errors may occur. When identified these errors have been corrected. While every attempt is made to correct errors during dictation discrepancies may still exist          Orders Placed This Encounter   Procedures    CBC With Differential With Platelet    Comp Metabolic Panel (14)    Lipid Panel    TSH and Free T4 [E]       Imaging & Consults:  COLOGUARD COLON CANCER SCREENING (EXTERNAL)  [facility]NA 2D+3D SCREENING BILAT (CPT=77067/22735)    Her 5 year prevention plan includes: annual visits for fasting labs  Health Maintenance   Topic Date Due    Colorectal Cancer Screening   Never done    Mammogram  Never done    Pap Smear  Never done    DTaP,Tdap,and Td Vaccines (1 - Tdap) 04/27/2018    Zoster Vaccines (1 of 2) Never done    Annual Physical  02/01/2024    COVID-19 Vaccine (1 - 2024-25 season) Never done    Influenza Vaccine (1) 10/01/2024    Annual Depression Screening  01/01/2025    Pneumococcal Vaccine: Birth to 64yrs  Aged Out     Patient/Caregiver Education:  Patient/Caregiver Education: There are no barriers to learning. Medical education done.  Outcome: Patient verbalizes understanding.    Educated by: MD     The patient indicates understanding of these issues and agrees to the plan.    SUGGESTED VACCINATIONS - Influenza, Pneumococcal, Zoster, Tetanus     Immunization History   Administered Date(s) Administered    Fluvirin, 3 Years & >, Im 11/08/2016    Influenza 11/15/2019    TD 04/26/2018    Tb Intradermal Test 07/30/2013, 03/22/2019    Td 04/26/2018       Influenza Annually   Pneumococcal if high risk   Td/Tdap once then every 10 years   HPV Females 11-26: 3 doses   Zoster (Shingles) 60 and older: one dose   Varicella 2 doses if not immune   MMR 1-2 doses if born after 1955 and not immune     Patient Active Problem List   Diagnosis    Osteoarthritis of lumbar spine    Chronic low back pain    Angioedema    Cystocele, midline    Rectocele    Female stress incontinence    Chronic pain of right knee    Factor V Leiden (HCC)    Dermatographic urticaria    DDD (degenerative disc disease), lumbosacral    Chondromalacia    Arthritis    Primary hypertension    Hyperlipidemia     PREVENTIVE VISIT,EST,40-64         [1]   Allergies  Allergen Reactions    Hydrochlorothiazide SWELLING    Latex ANAPHYLAXIS    Latex ANAPHYLAXIS and RASH    Morphine SWELLING     Legs swelling      Percocet [Oxycodone-Acetaminophen] SWELLING    Phentermine HIVES and SWELLING    Penicillin G RASH

## 2025-01-03 NOTE — PATIENT INSTRUCTIONS
Thank you for choosing Cole Garvin MD at [facility]  To Do: Leonie LOU Wood  1. Please see age appropriate health prevention below     Call [phone] to schedule the appointment.   Please signup for BIG Launcher, which is electronic access to your record if you have not done so.  All your results will post on there.  [email or web address]   You can NOW use BIG Launcher to book your appointments with us, or consider using open access scheduling which is through the [place] website [email or web address] and type in Cole Garvin MD and follow the links for \"Schedule Online Now\"    To schedule Imaging or tests at [facility] call Central Scheduling [phone], Go to [facility] A ER Building (For example: CT scans, X rays, Ultrasound, MRI)  Cardiac Testing in ER building Building A second floor Cardiac Testing [phone] (For example: Holter Monitor, Cardiac Stress tests,Event Monitor, or 2D Echocardiograms)  [facility] call [phone] usually in [facility]  Walk in Clinic in [place] at [address] Mon-Fri at 8am-7:30 p.m., and Sat/Sun 9:00a.m.-4:30 p.m.  Also at [address] [place] [address]  Call [phone] for info     Please call our office about any questions regarding your treatment/medicines/tests as a result of today's visit.  For your safety, read the entire package insert of all medicines prescribed to you and be aware of all of the risks of treatment even beyond those discussed today.  All therapies have potential risk of harm or side effects or medication interactions.  It is your duty and for your safety to discuss with the pharmacist and our office with questions, and to notify us and stop treatment if problems arise, but know that our intention is that the benefits outweigh those potential risks and we strive to make you healthier and to improve your quality of life.    Referrals, and Radiology Information:    If your insurance requires a referral to a specialist,  please allow 5 business days to process your referral request.    If Cole Garvin MD orders a CT or MRI, it may take up to 10 business days to receive approval from your insurance company. Once our office has called informing you that the insurance company approved your testing, please call Central Scheduling at [phone]  Please allow our office 5 business days to contact you regarding any testing results.    Refill policies:   Allow 3 business days for refills; controlled substances may take longer and must be picked up from the office in person.  Narcotic medications can only be filled in 30 day increments and must be refilled at an office visit only.  If your prescription is due for a refill, you may be due for a follow-up appointment.  We cannot refill your maintenance medications at a preventative wellness visit.  To best provide you care, patients receiving maintenance medications need to be seen at least twice a year.

## 2025-01-16 ENCOUNTER — TELEMEDICINE (OUTPATIENT)
Dept: FAMILY MEDICINE CLINIC | Facility: CLINIC | Age: 55
End: 2025-01-16
Payer: COMMERCIAL

## 2025-01-16 DIAGNOSIS — M70.61 TROCHANTERIC BURSITIS OF RIGHT HIP: Primary | ICD-10-CM

## 2025-01-16 DIAGNOSIS — M47.816 OSTEOARTHRITIS OF LUMBAR SPINE, UNSPECIFIED SPINAL OSTEOARTHRITIS COMPLICATION STATUS: ICD-10-CM

## 2025-01-16 PROCEDURE — 98005 SYNCH AUDIO-VIDEO EST LOW 20: CPT | Performed by: FAMILY MEDICINE

## 2025-01-16 RX ORDER — HYDROCODONE BITARTRATE AND ACETAMINOPHEN 10; 325 MG/1; MG/1
1-2 TABLET ORAL EVERY 8 HOURS PRN
Qty: 90 TABLET | Refills: 0 | Status: SHIPPED | OUTPATIENT
Start: 2025-01-16

## 2025-01-16 NOTE — PROGRESS NOTES
Subjective:   Patient ID: Leonie Wood is a 54 year old female.    HPI  Ms. Wood is a very pleasant 54-year-old female with multiple medical conditions which include but not limited to chronic pain secondary to osteoarthritis of the lumbar spine, lumbar DJD, history of rectocele status post repair, angioedema and hives requiring prednisone, hypertension, hyperlipidemia presenting for video visit follow-up today.  She had sprained her right hip when she was playing with her son a few days ago.  This had provided her with significant pain in the right hip and the right lumbar region.  She had run out of her Norco which was previously refilled a few weeks ago.  She would like to have this refilled at this point as this is the only medication for pain that helps for her.    I had reviewed past medical and family histories together with allergy and medication lists documented.    History/Other:   Review of Systems   Musculoskeletal:  Positive for arthralgias. Negative for joint swelling.   Neurological:  Negative for weakness and numbness.     Current Outpatient Medications   Medication Sig Dispense Refill    HYDROcodone-acetaminophen (NORCO)  MG Oral Tab Take 1-2 tablets by mouth every 8 (eight) hours as needed for Pain. Ok to refill early Had an allergic reaction to Percocet (Eye swelling) 90 tablet 0    ibuprofen 200 MG Oral Tab Take 1 tablet (200 mg total) by mouth every 6 (six) hours as needed for Pain.      traMADol 50 MG Oral Tab TAKE 1 TO 2 TABLETS ( MG TOTAL) BY MOUTH EVERY 4 TO 6 HOURS AS NEEDED FOR PAIN. 240 tablet 0    hydrALAZINE 25 MG Oral Tab Take 1 tablet (25 mg total) by mouth in the morning and 1 tablet (25 mg total) before bedtime. 60 tablet 1    rosuvastatin 20 MG Oral Tab Take 1 tablet (20 mg total) by mouth nightly. (Patient not taking: Reported on 1/3/2025) 90 tablet 0    metoprolol succinate ER 50 MG Oral Tablet 24 Hr Take 1 tablet (50 mg total) by mouth daily. 90 tablet 1     EPINEPHrine 0.3 MG/0.3ML Injection Solution Auto-injector Inject 0.3 mL (1 each total) as directed one time.      LORazepam 1 MG Oral Tab Take 1 tablet (1 mg total) by mouth 2 (two) times daily as needed for Anxiety. 30 tablet 0    albuterol 108 (90 Base) MCG/ACT Inhalation Aero Soln Inhale 2 puffs into the lungs every 6 (six) hours as needed for Wheezing. 3 each 1    CYCLOBENZAPRINE 10 MG Oral Tab TAKE 1 TABLET BY MOUTH NIGHTLY AS NEEDED FOR MUSCLE SPASMS 30 tablet 5    predniSONE 50 MG Oral Tab Take 1 tablet (50 mg total) by mouth as needed. For allergy flare-up (Patient not taking: Reported on 1/3/2025)      aspirin  MG Oral Tab EC TAKE ONE TABLET BY MOUTH ONE TIME DAILY POST OPERATIVELY      Fexofenadine HCl 180 MG Oral Tab Take 1 tablet (180 mg total) by mouth daily. (Patient not taking: Reported on 1/3/2025)      Naloxone HCl 4 MG/0.1ML Nasal Liquid 4 mg by Nasal route as needed. If patient remains unresponsive, repeat dose in other nostril 2-5 minutes after first dose. 1 kit 0    famoTIDine 20 MG Oral Tab Take 1 tablet (20 mg total) by mouth 2 (two) times daily.      Ketotifen Fumarate (KETOTIFEN HYDROGEN FUMARATE) Does not apply Powder 2 mg. (Patient not taking: Reported on 1/3/2025)      psyllium 28 % Oral Powd Pack Take 1 packet by mouth as needed.      cetirizine 10 MG Oral Tab Take 1-2 tablets (10-20 mg total) by mouth daily. (Patient not taking: Reported on 1/3/2025)      caffeine 200 MG Oral Tab Take 1 tablet (200 mg total) by mouth daily.       Allergies:Allergies[1]    Objective:   Physical Exam  Constitutional:       General: She is not in acute distress.  Neurological:      Mental Status: She is alert.         Assessment & Plan:   1. Trochanteric bursitis of right hip    2. Osteoarthritis of lumbar spine, unspecified spinal osteoarthritis complication status    -Will refill pain medication for now as this is an early refill  - May apply ice to affected area  - Call or come in sooner if  symptoms worsen or persist    This note was prepared using Dragon Medical voice recognition dictation software. As a result errors may occur. When identified these errors have been corrected. While every attempt is made to correct errors during dictation discrepancies may still exist            No orders of the defined types were placed in this encounter.      Meds This Visit:  Requested Prescriptions     Signed Prescriptions Disp Refills    HYDROcodone-acetaminophen (NORCO)  MG Oral Tab 90 tablet 0     Sig: Take 1-2 tablets by mouth every 8 (eight) hours as needed for Pain. Ok to refill early Had an allergic reaction to Percocet (Eye swelling)       Imaging & Referrals:  None         [1]   Allergies  Allergen Reactions    Hydrochlorothiazide SWELLING    Latex ANAPHYLAXIS    Latex ANAPHYLAXIS and RASH    Morphine SWELLING     Legs swelling      Percocet [Oxycodone-Acetaminophen] SWELLING    Phentermine HIVES and SWELLING    Penicillin G RASH

## 2025-01-29 DIAGNOSIS — M47.816 OSTEOARTHRITIS OF LUMBAR SPINE, UNSPECIFIED SPINAL OSTEOARTHRITIS COMPLICATION STATUS: ICD-10-CM

## 2025-01-29 DIAGNOSIS — G89.29 CHRONIC PAIN OF RIGHT KNEE: ICD-10-CM

## 2025-01-29 DIAGNOSIS — M25.561 CHRONIC PAIN OF RIGHT KNEE: ICD-10-CM

## 2025-01-29 DIAGNOSIS — S83.511A RUPTURE OF ANTERIOR CRUCIATE LIGAMENT OF RIGHT KNEE, INITIAL ENCOUNTER: ICD-10-CM

## 2025-01-29 DIAGNOSIS — S83.241A TEAR OF MEDIAL MENISCUS OF RIGHT KNEE, UNSPECIFIED TEAR TYPE, UNSPECIFIED WHETHER OLD OR CURRENT TEAR, INITIAL ENCOUNTER: ICD-10-CM

## 2025-01-29 DIAGNOSIS — G89.4 CHRONIC PAIN SYNDROME: ICD-10-CM

## 2025-01-29 RX ORDER — TRAMADOL HYDROCHLORIDE 50 MG/1
TABLET ORAL
Qty: 240 TABLET | Refills: 0 | Status: SHIPPED | OUTPATIENT
Start: 2025-01-29

## 2025-02-07 ENCOUNTER — TELEMEDICINE (OUTPATIENT)
Dept: FAMILY MEDICINE CLINIC | Facility: CLINIC | Age: 55
End: 2025-02-07
Payer: COMMERCIAL

## 2025-02-07 DIAGNOSIS — M47.816 OSTEOARTHRITIS OF LUMBAR SPINE, UNSPECIFIED SPINAL OSTEOARTHRITIS COMPLICATION STATUS: ICD-10-CM

## 2025-02-07 RX ORDER — HYDROCODONE BITARTRATE AND ACETAMINOPHEN 10; 325 MG/1; MG/1
1-2 TABLET ORAL EVERY 8 HOURS PRN
Qty: 90 TABLET | Refills: 0 | Status: SHIPPED | OUTPATIENT
Start: 2025-02-07

## 2025-02-07 NOTE — PROGRESS NOTES
Subjective:   Patient ID: Leonie Wood is a 54 year old female.    HPI  Ms. Wood is a very pleasant 54-year-old female with multiple medical conditions which include but not limited to chronic pain secondary to osteoarthritis of the lumbar spine, lumbar DJD, history of rectocele status post repair, angioedema and hives requiring prednisone, hypertension, hyperlipidemia presenting for video visit follow-up today.  She will need refills for Norco which has been effective for her managing chronic pain secondary to lumbar DJD.  No side effects from taking this meds.      This note was prepared using Dragon Medical voice recognition dictation software. As a result errors may occur. When identified these errors have been corrected. While every attempt is made to correct errors during dictation discrepancies may still exist          History/Other:   Review of Systems  Musculoskeletal:  Positive for arthralgias. Negative for joint swelling.   Neurological:  Negative for weakness and numbness.      Current Outpatient Medications   Medication Sig Dispense Refill    HYDROcodone-acetaminophen (NORCO)  MG Oral Tab Take 1-2 tablets by mouth every 8 (eight) hours as needed for Pain. Ok to refill early Had an allergic reaction to Percocet (Eye swelling) 90 tablet 0    TRAMADOL 50 MG Oral Tab TAKE 1 TO 2 TABLETS ( MG TOTAL) BY MOUTH EVERY 4 TO 6 HOURS AS NEEDED FOR PAIN. 240 tablet 0    ibuprofen 200 MG Oral Tab Take 1 tablet (200 mg total) by mouth every 6 (six) hours as needed for Pain.      hydrALAZINE 25 MG Oral Tab Take 1 tablet (25 mg total) by mouth in the morning and 1 tablet (25 mg total) before bedtime. 60 tablet 1    rosuvastatin 20 MG Oral Tab Take 1 tablet (20 mg total) by mouth nightly. (Patient not taking: Reported on 1/3/2025) 90 tablet 0    metoprolol succinate ER 50 MG Oral Tablet 24 Hr Take 1 tablet (50 mg total) by mouth daily. 90 tablet 1    EPINEPHrine 0.3 MG/0.3ML Injection Solution  Auto-injector Inject 0.3 mL (1 each total) as directed one time.      LORazepam 1 MG Oral Tab Take 1 tablet (1 mg total) by mouth 2 (two) times daily as needed for Anxiety. 30 tablet 0    albuterol 108 (90 Base) MCG/ACT Inhalation Aero Soln Inhale 2 puffs into the lungs every 6 (six) hours as needed for Wheezing. 3 each 1    CYCLOBENZAPRINE 10 MG Oral Tab TAKE 1 TABLET BY MOUTH NIGHTLY AS NEEDED FOR MUSCLE SPASMS 30 tablet 5    predniSONE 50 MG Oral Tab Take 1 tablet (50 mg total) by mouth as needed. For allergy flare-up (Patient not taking: Reported on 1/3/2025)      aspirin  MG Oral Tab EC TAKE ONE TABLET BY MOUTH ONE TIME DAILY POST OPERATIVELY      Fexofenadine HCl 180 MG Oral Tab Take 1 tablet (180 mg total) by mouth daily. (Patient not taking: Reported on 1/3/2025)      Naloxone HCl 4 MG/0.1ML Nasal Liquid 4 mg by Nasal route as needed. If patient remains unresponsive, repeat dose in other nostril 2-5 minutes after first dose. 1 kit 0    famoTIDine 20 MG Oral Tab Take 1 tablet (20 mg total) by mouth 2 (two) times daily.      Ketotifen Fumarate (KETOTIFEN HYDROGEN FUMARATE) Does not apply Powder 2 mg. (Patient not taking: Reported on 1/3/2025)      psyllium 28 % Oral Powd Pack Take 1 packet by mouth as needed.      cetirizine 10 MG Oral Tab Take 1-2 tablets (10-20 mg total) by mouth daily. (Patient not taking: Reported on 1/3/2025)      caffeine 200 MG Oral Tab Take 1 tablet (200 mg total) by mouth daily.       Allergies:Allergies[1]    Objective:   Physical Exam  Constitutional:       General: She is not in acute distress.  Neurological:      Mental Status: She is alert.      Assessment & Plan:   1. Osteoarthritis of lumbar spine, unspecified spinal osteoarthritis complication status    -Will refill Norco as this has been effective for her  - Continue with supportive care at this point  - Will monitor  Follow-up as scheduled or as needed    This note was prepared using Dragon Medical voice recognition  dictation software. As a result errors may occur. When identified these errors have been corrected. While every attempt is made to correct errors during dictation discrepancies may still exist            No orders of the defined types were placed in this encounter.      Meds This Visit:  Requested Prescriptions     Signed Prescriptions Disp Refills    HYDROcodone-acetaminophen (NORCO)  MG Oral Tab 90 tablet 0     Sig: Take 1-2 tablets by mouth every 8 (eight) hours as needed for Pain. Ok to refill early Had an allergic reaction to Percocet (Eye swelling)       Imaging & Referrals:  None         [1]   Allergies  Allergen Reactions    Hydrochlorothiazide SWELLING    Latex ANAPHYLAXIS    Latex ANAPHYLAXIS and RASH    Morphine SWELLING     Legs swelling      Percocet [Oxycodone-Acetaminophen] SWELLING    Phentermine HIVES and SWELLING    Penicillin G RASH

## 2025-02-18 ENCOUNTER — TELEPHONE (OUTPATIENT)
Dept: FAMILY MEDICINE CLINIC | Facility: CLINIC | Age: 55
End: 2025-02-18

## 2025-02-18 RX ORDER — PREDNISONE 1 MG/1
1 TABLET ORAL
Qty: 30 TABLET | Refills: 0 | Status: SHIPPED | OUTPATIENT
Start: 2025-02-18

## 2025-02-18 NOTE — TELEPHONE ENCOUNTER
Future Appointments   Date Time Provider Department Center   2/19/2025 12:30 PM Cole Garvin MD [facility] [address]     Patient scheduled TV, patient fell on ice. Patient with tongue swelling, trying to get refill on Prednisone. Wanted to have message placed in order to get medication sooner.

## 2025-02-19 ENCOUNTER — VIRTUAL PHONE E/M (OUTPATIENT)
Dept: FAMILY MEDICINE CLINIC | Facility: CLINIC | Age: 55
End: 2025-02-19
Payer: COMMERCIAL

## 2025-02-19 DIAGNOSIS — M54.50 ACUTE LOW BACK PAIN, UNSPECIFIED BACK PAIN LATERALITY, UNSPECIFIED WHETHER SCIATICA PRESENT: Primary | ICD-10-CM

## 2025-02-19 DIAGNOSIS — E66.9 OBESITY (BMI 30-39.9): ICD-10-CM

## 2025-02-19 DIAGNOSIS — M47.816 OSTEOARTHRITIS OF LUMBAR SPINE, UNSPECIFIED SPINAL OSTEOARTHRITIS COMPLICATION STATUS: ICD-10-CM

## 2025-02-19 PROCEDURE — 98014 SYNCH AUDIO-ONLY EST MOD 30: CPT | Performed by: FAMILY MEDICINE

## 2025-02-19 RX ORDER — METOPROLOL SUCCINATE 50 MG/1
50 TABLET, EXTENDED RELEASE ORAL DAILY
Qty: 90 TABLET | Refills: 1 | Status: SHIPPED | OUTPATIENT
Start: 2025-02-19

## 2025-02-19 RX ORDER — HYDROCODONE BITARTRATE AND ACETAMINOPHEN 10; 325 MG/1; MG/1
1-2 TABLET ORAL EVERY 4 HOURS PRN
Qty: 90 TABLET | Refills: 0 | Status: SHIPPED | OUTPATIENT
Start: 2025-02-19

## 2025-02-19 RX ORDER — TIRZEPATIDE 2.5 MG/.5ML
2.5 INJECTION, SOLUTION SUBCUTANEOUS WEEKLY
Qty: 2 ML | Refills: 0 | Status: SHIPPED | OUTPATIENT
Start: 2025-02-19

## 2025-02-19 NOTE — PROGRESS NOTES
Subjective:   Patient ID: Leonie Wood is a 54 year old female.    HPI  Ms. Wood is a very pleasant 54-year-old female with multiple medical conditions which include but not limited to chronic pain secondary to osteoarthritis of the lumbar spine, lumbar DJD, history of rectocele status post repair, angioedema and hives requiring prednisone, hypertension, hyperlipidemia presenting for phone visit today for back, hip and knee pain.  She had fallen eyes 2 days ago when we had inclement weather at that time.  She usually will use and take Norco for chronic pain but is requesting to get an early refill.  She did talk to her current pharmacy and said that they would allow for it.  She also noticed that she has had lip swelling and had leftover prednisone.  She did request for prednisone yesterday to be sent to her pharmacy and reports to be better.  She also needs refills for metoprolol but is only taking 25 mg daily.  She reports her blood pressure is under good control.  She also is looking forward to losing some weight and is hoping that when she lose weight that her chronic pain would get better.  She is requesting for GLP-1 medications.      I had reviewed past medical and family histories together with allergy and medication lists documented.    History/Other:   Review of Systems  Musculoskeletal:  Positive for arthralgias. Negative for joint swelling.   Neurological:  Negative for weakness and numbness.      Current Outpatient Medications   Medication Sig Dispense Refill    HYDROcodone-acetaminophen (NORCO)  MG Oral Tab Take 1-2 tablets by mouth every 4 (four) hours as needed for Pain. Ok to refill early Had an allergic reaction to Percocet (Eye swelling) 90 tablet 0    metoprolol succinate ER 50 MG Oral Tablet 24 Hr Take 1 tablet (50 mg total) by mouth daily. 90 tablet 1    Tirzepatide-Weight Management (ZEPBOUND) 2.5 MG/0.5ML Subcutaneous Solution Auto-injector Inject 2.5 mg into the skin once a  week. 2 mL 0    predniSONE 1 MG Oral Tab Take 1 tablet (1 mg total) by mouth daily with breakfast. 30 tablet 0    TRAMADOL 50 MG Oral Tab TAKE 1 TO 2 TABLETS ( MG TOTAL) BY MOUTH EVERY 4 TO 6 HOURS AS NEEDED FOR PAIN. 240 tablet 0    ibuprofen 200 MG Oral Tab Take 1 tablet (200 mg total) by mouth every 6 (six) hours as needed for Pain.      hydrALAZINE 25 MG Oral Tab Take 1 tablet (25 mg total) by mouth in the morning and 1 tablet (25 mg total) before bedtime. 60 tablet 1    rosuvastatin 20 MG Oral Tab Take 1 tablet (20 mg total) by mouth nightly. (Patient not taking: Reported on 1/3/2025) 90 tablet 0    EPINEPHrine 0.3 MG/0.3ML Injection Solution Auto-injector Inject 0.3 mL (1 each total) as directed one time.      LORazepam 1 MG Oral Tab Take 1 tablet (1 mg total) by mouth 2 (two) times daily as needed for Anxiety. 30 tablet 0    albuterol 108 (90 Base) MCG/ACT Inhalation Aero Soln Inhale 2 puffs into the lungs every 6 (six) hours as needed for Wheezing. 3 each 1    CYCLOBENZAPRINE 10 MG Oral Tab TAKE 1 TABLET BY MOUTH NIGHTLY AS NEEDED FOR MUSCLE SPASMS 30 tablet 5    predniSONE 50 MG Oral Tab Take 1 tablet (50 mg total) by mouth as needed. For allergy flare-up (Patient not taking: Reported on 1/3/2025)      aspirin  MG Oral Tab EC TAKE ONE TABLET BY MOUTH ONE TIME DAILY POST OPERATIVELY      Fexofenadine HCl 180 MG Oral Tab Take 1 tablet (180 mg total) by mouth daily. (Patient not taking: Reported on 1/3/2025)      Naloxone HCl 4 MG/0.1ML Nasal Liquid 4 mg by Nasal route as needed. If patient remains unresponsive, repeat dose in other nostril 2-5 minutes after first dose. 1 kit 0    famoTIDine 20 MG Oral Tab Take 1 tablet (20 mg total) by mouth 2 (two) times daily.      Ketotifen Fumarate (KETOTIFEN HYDROGEN FUMARATE) Does not apply Powder 2 mg. (Patient not taking: Reported on 1/3/2025)      psyllium 28 % Oral Powd Pack Take 1 packet by mouth as needed.      cetirizine 10 MG Oral Tab Take 1-2  tablets (10-20 mg total) by mouth daily. (Patient not taking: Reported on 1/3/2025)      caffeine 200 MG Oral Tab Take 1 tablet (200 mg total) by mouth daily.       Allergies:Allergies[1]    Objective:   Physical Exam  Constitutional:       General: She is not in acute distress.  Neurological:      Mental Status: She is alert.      Assessment & Plan:   1. Acute low back pain, unspecified back pain laterality, unspecified whether sciatica present   -Will give her an early refill of her pain medication regimen which was sent to her pharmacy  - Fall precautions   2. Osteoarthritis of lumbar spine, unspecified spinal osteoarthritis complication status   -Please see #1   3. Obesity (BMI 30-39.9)   -Will start on Zepbound 2.5 mg every week  - If approved by insurance follow-up after 4 weeks after initiation of this medication   ,, Sooner symptoms worsen or persist      This note was prepared using Dragon Medical voice recognition dictation software. As a result errors may occur. When identified these errors have been corrected. While every attempt is made to correct errors during dictation discrepancies may still exist            No orders of the defined types were placed in this encounter.      Meds This Visit:  Requested Prescriptions     Signed Prescriptions Disp Refills    HYDROcodone-acetaminophen (NORCO)  MG Oral Tab 90 tablet 0     Sig: Take 1-2 tablets by mouth every 4 (four) hours as needed for Pain. Ok to refill early Had an allergic reaction to Percocet (Eye swelling)    metoprolol succinate ER 50 MG Oral Tablet 24 Hr 90 tablet 1     Sig: Take 1 tablet (50 mg total) by mouth daily.    Tirzepatide-Weight Management (ZEPBOUND) 2.5 MG/0.5ML Subcutaneous Solution Auto-injector 2 mL 0     Sig: Inject 2.5 mg into the skin once a week.       Imaging & Referrals:  None         [1]   Allergies  Allergen Reactions    Hydrochlorothiazide SWELLING    Latex ANAPHYLAXIS    Latex ANAPHYLAXIS and RASH    Morphine  SWELLING     Legs swelling      Percocet [Oxycodone-Acetaminophen] SWELLING    Phentermine HIVES and SWELLING    Penicillin G RASH

## 2025-02-19 NOTE — PROGRESS NOTES
Virtual Telephone Check-In    Leonie Wood verbally consents to a Virtual/Telephone Check-In visit on 02/19/25.  Patient has been referred to the [facility] website at [email or web address] to review the yearly Consent to Treat document.    Patient understands and accepts financial responsibility for any deductible, co-insurance and/or co-pays associated with this service.    Duration of the service: 30 minutes      Summary of topics discussed:             Cole Garvin MD

## 2025-02-20 ENCOUNTER — TELEPHONE (OUTPATIENT)
Dept: FAMILY MEDICINE CLINIC | Facility: CLINIC | Age: 55
End: 2025-02-20

## 2025-02-20 NOTE — TELEPHONE ENCOUNTER
Fax received from [employer] requesting Prior Authorization for Zepbound Key # [id number], AND hYDROCODONE Rowe # [id number].     Placed in MA folder.

## 2025-02-25 ENCOUNTER — TELEPHONE (OUTPATIENT)
Dept: FAMILY MEDICINE CLINIC | Facility: CLINIC | Age: 55
End: 2025-02-25

## 2025-02-25 NOTE — TELEPHONE ENCOUNTER
PA for Zepbound denied.    MCM sent to pt.    Note from payer: Request Reference Number: [id number].  ZEPBOUND     INJ 2.5MG is denied due to Plan Exclusion.  For further questions, call [phone].  Payer: [employer] Case ID: [id number]

## 2025-02-26 DIAGNOSIS — M25.561 CHRONIC PAIN OF RIGHT KNEE: ICD-10-CM

## 2025-02-26 DIAGNOSIS — G89.29 CHRONIC PAIN OF RIGHT KNEE: ICD-10-CM

## 2025-02-26 DIAGNOSIS — M47.816 OSTEOARTHRITIS OF LUMBAR SPINE, UNSPECIFIED SPINAL OSTEOARTHRITIS COMPLICATION STATUS: ICD-10-CM

## 2025-02-26 DIAGNOSIS — G89.4 CHRONIC PAIN SYNDROME: ICD-10-CM

## 2025-02-26 DIAGNOSIS — S83.511A RUPTURE OF ANTERIOR CRUCIATE LIGAMENT OF RIGHT KNEE, INITIAL ENCOUNTER: ICD-10-CM

## 2025-02-26 DIAGNOSIS — S83.241A TEAR OF MEDIAL MENISCUS OF RIGHT KNEE, UNSPECIFIED TEAR TYPE, UNSPECIFIED WHETHER OLD OR CURRENT TEAR, INITIAL ENCOUNTER: ICD-10-CM

## 2025-02-26 RX ORDER — TRAMADOL HYDROCHLORIDE 50 MG/1
TABLET ORAL
Qty: 240 TABLET | Refills: 0 | Status: SHIPPED | OUTPATIENT
Start: 2025-02-26

## 2025-03-05 DIAGNOSIS — M47.816 OSTEOARTHRITIS OF LUMBAR SPINE, UNSPECIFIED SPINAL OSTEOARTHRITIS COMPLICATION STATUS: ICD-10-CM

## 2025-03-05 RX ORDER — HYDROCODONE BITARTRATE AND ACETAMINOPHEN 10; 325 MG/1; MG/1
1-2 TABLET ORAL EVERY 4 HOURS PRN
Qty: 90 TABLET | Refills: 0 | Status: SHIPPED | OUTPATIENT
Start: 2025-03-05

## 2025-03-05 NOTE — TELEPHONE ENCOUNTER
Patient would like refill on the [place] sent to [facility] pharmacy. This is for her R hip.

## 2025-03-05 NOTE — TELEPHONE ENCOUNTER
Requesting Norco 10/325mg  Last OV: 2/19/25 Telemedicine  RTC: prn  Last Rx'd 2/19/25 #90 with 0 refills    No future appointments.    Per [place] , last dispensed 2/19/25 #90 (15 day supply)    Controlled med:  Rx pended and routed for approval/denial

## 2025-03-06 ENCOUNTER — TELEPHONE (OUTPATIENT)
Dept: FAMILY MEDICINE CLINIC | Facility: CLINIC | Age: 55
End: 2025-03-06

## 2025-03-06 NOTE — TELEPHONE ENCOUNTER
Received incoming fax from [facility] pharmacy on [address], this pharmacy needs PA. Will place fax in MA folder

## 2025-03-11 NOTE — TELEPHONE ENCOUNTER
pproved   2/19/2025  7:49 PM  Appeal supported: No   Note from payer: Request Reference Number: [id number].  HYDROCO/APAP TAB 10-325MG is approved through 08/19/2025.  Your patient may now fill this prescription and it will be covered.

## 2025-03-12 RX ORDER — METOPROLOL SUCCINATE 100 MG/1
100 TABLET, EXTENDED RELEASE ORAL DAILY
Qty: 90 TABLET | Refills: 1 | Status: SHIPPED | OUTPATIENT
Start: 2025-03-12

## 2025-03-17 ENCOUNTER — TELEPHONE (OUTPATIENT)
Dept: FAMILY MEDICINE CLINIC | Facility: CLINIC | Age: 55
End: 2025-03-17

## 2025-03-17 ENCOUNTER — VIRTUAL PHONE E/M (OUTPATIENT)
Dept: FAMILY MEDICINE CLINIC | Facility: CLINIC | Age: 55
End: 2025-03-17
Payer: COMMERCIAL

## 2025-03-17 DIAGNOSIS — M47.816 OSTEOARTHRITIS OF LUMBAR SPINE, UNSPECIFIED SPINAL OSTEOARTHRITIS COMPLICATION STATUS: ICD-10-CM

## 2025-03-17 RX ORDER — HYDROCODONE BITARTRATE AND ACETAMINOPHEN 10; 325 MG/1; MG/1
1 TABLET ORAL 4 TIMES DAILY PRN
Qty: 120 TABLET | Refills: 0 | Status: SHIPPED | OUTPATIENT
Start: 2025-03-17 | End: 2025-04-16

## 2025-03-17 NOTE — TELEPHONE ENCOUNTER
Future Appointments   Date Time Provider Department Center   3/27/2025 11:00 AM Cole Garvin MD [facility] [address] [facility] [address]     [place] for her hip to [facility].

## 2025-03-17 NOTE — TELEPHONE ENCOUNTER
Per [place] , last dispensed 3/5/25 #90 (7 day supply). Rx is written for 1-2 tablets every 4 hours, patient requesting refill. Too soon?

## 2025-03-17 NOTE — TELEPHONE ENCOUNTER
Requesting Norco 10/325mg  Last OV: 2/19/25  RTC: 1 month  Last Rx'd 3/5/25 #90 with 0 refills    Future Appointments   Date Time Provider Department Center   3/27/2025 11:00 AM Cole Garvin MD [facility] [address] [facility] [address]       Per [place] , last dispensed 3/5/25 #90     Should 90 pills be lasting patient only 10 days?

## 2025-03-17 NOTE — PROGRESS NOTES
Subjective:   Patient ID: Leonie Wood is a 54 year old female.    HPI  Ms. Wood is a very pleasant 54-year-old female with multiple medical conditions which include but not limited to chronic pain secondary to osteoarthritis of the lumbar spine, lumbar DJD, history of rectocele status post repair, angioedema and hives requiring prednisone, hypertension, hyperlipidemia presenting for phone visit today for follow-up for medication refills.  She would need her [place] refill.  However we just had refilled this about 2 weeks ago.  She actually made an appointment with me next week and is hoping to have MRI of her hip.    I had reviewed past medical and family histories together with allergy and medication lists documented.    History/Other:   Review of Systems  Musculoskeletal:  Positive for arthralgias. Negative for joint swelling.   Neurological:  Negative for weakness and numbness.   Current Outpatient Medications   Medication Sig Dispense Refill    HYDROcodone-acetaminophen (NORCO)  MG Oral Tab Take 1 tablet by mouth 4 (four) times daily as needed for Pain. Ok to refill early today 120 tablet 0    METOPROLOL SUCCINATE  MG Oral Tablet 24 Hr TAKE 1 TABLET BY MOUTH EVERY DAY 90 tablet 1    TRAMADOL 50 MG Oral Tab TAKE 1 TO 2 TABLETS ( MG TOTAL) BY MOUTH EVERY 4 TO 6 HOURS AS NEEDED FOR PAIN. 240 tablet 0    metoprolol succinate ER 50 MG Oral Tablet 24 Hr Take 1 tablet (50 mg total) by mouth daily. 90 tablet 1    Tirzepatide-Weight Management (ZEPBOUND) 2.5 MG/0.5ML Subcutaneous Solution Auto-injector Inject 2.5 mg into the skin once a week. 2 mL 0    predniSONE 1 MG Oral Tab Take 1 tablet (1 mg total) by mouth daily with breakfast. 30 tablet 0    ibuprofen 200 MG Oral Tab Take 1 tablet (200 mg total) by mouth every 6 (six) hours as needed for Pain.      hydrALAZINE 25 MG Oral Tab Take 1 tablet (25 mg total) by mouth in the morning and 1 tablet (25 mg total) before bedtime. 60 tablet 1     rosuvastatin 20 MG Oral Tab Take 1 tablet (20 mg total) by mouth nightly. (Patient not taking: Reported on 1/3/2025) 90 tablet 0    EPINEPHrine 0.3 MG/0.3ML Injection Solution Auto-injector Inject 0.3 mL (1 each total) as directed one time.      LORazepam 1 MG Oral Tab Take 1 tablet (1 mg total) by mouth 2 (two) times daily as needed for Anxiety. 30 tablet 0    albuterol 108 (90 Base) MCG/ACT Inhalation Aero Soln Inhale 2 puffs into the lungs every 6 (six) hours as needed for Wheezing. 3 each 1    CYCLOBENZAPRINE 10 MG Oral Tab TAKE 1 TABLET BY MOUTH NIGHTLY AS NEEDED FOR MUSCLE SPASMS 30 tablet 5    predniSONE 50 MG Oral Tab Take 1 tablet (50 mg total) by mouth as needed. For allergy flare-up (Patient not taking: Reported on 1/3/2025)      aspirin  MG Oral Tab EC TAKE ONE TABLET BY MOUTH ONE TIME DAILY POST OPERATIVELY      Fexofenadine HCl 180 MG Oral Tab Take 1 tablet (180 mg total) by mouth daily. (Patient not taking: Reported on 1/3/2025)      Naloxone HCl 4 MG/0.1ML Nasal Liquid 4 mg by Nasal route as needed. If patient remains unresponsive, repeat dose in other nostril 2-5 minutes after first dose. 1 kit 0    famoTIDine 20 MG Oral Tab Take 1 tablet (20 mg total) by mouth 2 (two) times daily.      Ketotifen Fumarate (KETOTIFEN HYDROGEN FUMARATE) Does not apply Powder 2 mg. (Patient not taking: Reported on 1/3/2025)      psyllium 28 % Oral Powd Pack Take 1 packet by mouth as needed.      cetirizine 10 MG Oral Tab Take 1-2 tablets (10-20 mg total) by mouth daily. (Patient not taking: Reported on 1/3/2025)      caffeine 200 MG Oral Tab Take 1 tablet (200 mg total) by mouth daily.       Allergies:Allergies[1]    Objective:   Physical Exam  Constitutional:       General: She is not in acute distress.  Neurological:      Mental Status: She is alert.      Assessment & Plan:   1. Osteoarthritis of lumbar spine, unspecified spinal osteoarthritis complication status    -Will change directions on her Norco and will  make it Norco 10/325 mg 4 times a day as needed with quantity 120 for 30 days.  I did explain to her that this is a controlled substance and is regulated closely.  - Agree with seeing her next week in the office to be evaluated for ongoing hip pain.    This note was prepared using Dragon Medical voice recognition dictation software. As a result errors may occur. When identified these errors have been corrected. While every attempt is made to correct errors during dictation discrepancies may still exist            No orders of the defined types were placed in this encounter.      Meds This Visit:  Requested Prescriptions     Signed Prescriptions Disp Refills    HYDROcodone-acetaminophen (NORCO)  MG Oral Tab 120 tablet 0     Sig: Take 1 tablet by mouth 4 (four) times daily as needed for Pain. Ok to refill early today       Imaging & Referrals:  None         [1]   Allergies  Allergen Reactions    Hydrochlorothiazide SWELLING    Latex ANAPHYLAXIS    Latex ANAPHYLAXIS and RASH    Morphine SWELLING     Legs swelling      Percocet [Oxycodone-Acetaminophen] SWELLING    Phentermine HIVES and SWELLING    Penicillin G RASH

## 2025-03-24 RX ORDER — FLUCONAZOLE 150 MG/1
150 TABLET ORAL ONCE
Qty: 1 TABLET | Refills: 3 | Status: SHIPPED | OUTPATIENT
Start: 2025-03-24 | End: 2025-03-24

## 2025-03-27 ENCOUNTER — TELEMEDICINE (OUTPATIENT)
Dept: FAMILY MEDICINE CLINIC | Facility: CLINIC | Age: 55
End: 2025-03-27
Payer: COMMERCIAL

## 2025-03-27 DIAGNOSIS — M25.561 CHRONIC PAIN OF RIGHT KNEE: ICD-10-CM

## 2025-03-27 DIAGNOSIS — S83.511A RUPTURE OF ANTERIOR CRUCIATE LIGAMENT OF RIGHT KNEE, INITIAL ENCOUNTER: ICD-10-CM

## 2025-03-27 DIAGNOSIS — S83.241A TEAR OF MEDIAL MENISCUS OF RIGHT KNEE, UNSPECIFIED TEAR TYPE, UNSPECIFIED WHETHER OLD OR CURRENT TEAR, INITIAL ENCOUNTER: ICD-10-CM

## 2025-03-27 DIAGNOSIS — M47.816 OSTEOARTHRITIS OF LUMBAR SPINE, UNSPECIFIED SPINAL OSTEOARTHRITIS COMPLICATION STATUS: ICD-10-CM

## 2025-03-27 DIAGNOSIS — G89.4 CHRONIC PAIN SYNDROME: ICD-10-CM

## 2025-03-27 DIAGNOSIS — G89.29 CHRONIC PAIN OF RIGHT KNEE: ICD-10-CM

## 2025-03-27 PROCEDURE — 98005 SYNCH AUDIO-VIDEO EST LOW 20: CPT | Performed by: FAMILY MEDICINE

## 2025-03-27 RX ORDER — TRAMADOL HYDROCHLORIDE 50 MG/1
TABLET ORAL
Qty: 240 TABLET | Refills: 0 | Status: SHIPPED | OUTPATIENT
Start: 2025-03-27

## 2025-03-27 NOTE — PROGRESS NOTES
Subjective:   Patient ID: Leonie Wood is a 54 year old female.    HPI   is a very pleasant 54-year-old female with multiple medical conditions presenting for video visit follow-up for medication refills but she will need tramadol refilled and is due this coming Saturday.  No side effects from taking this meds.  She also takes Norco to help manage her pain which has been effective for the most part except that she continues to have worsening back pain and right hip pain.  In fact we ordered MRI last year of those corresponding regions of her body.  She would like to give it 6 more weeks and if it is not better she will have those done.    I had reviewed past medical and family histories together with allergy and medication lists documented.    History/Other:   Review of Systems   Musculoskeletal:  Positive for arthralgias and back pain.     Current Outpatient Medications   Medication Sig Dispense Refill    traMADol 50 MG Oral Tab TAKE 1 TO 2 TABLETS ( MG TOTAL) BY MOUTH EVERY 4 TO 6 HOURS AS NEEDED FOR PAIN. 240 tablet 0    HYDROcodone-acetaminophen (NORCO)  MG Oral Tab Take 1 tablet by mouth 4 (four) times daily as needed for Pain. Ok to refill early today 120 tablet 0    METOPROLOL SUCCINATE  MG Oral Tablet 24 Hr TAKE 1 TABLET BY MOUTH EVERY DAY 90 tablet 1    metoprolol succinate ER 50 MG Oral Tablet 24 Hr Take 1 tablet (50 mg total) by mouth daily. 90 tablet 1    Tirzepatide-Weight Management (ZEPBOUND) 2.5 MG/0.5ML Subcutaneous Solution Auto-injector Inject 2.5 mg into the skin once a week. 2 mL 0    predniSONE 1 MG Oral Tab Take 1 tablet (1 mg total) by mouth daily with breakfast. 30 tablet 0    ibuprofen 200 MG Oral Tab Take 1 tablet (200 mg total) by mouth every 6 (six) hours as needed for Pain.      hydrALAZINE 25 MG Oral Tab Take 1 tablet (25 mg total) by mouth in the morning and 1 tablet (25 mg total) before bedtime. 60 tablet 1    rosuvastatin 20 MG Oral Tab Take 1  tablet (20 mg total) by mouth nightly. (Patient not taking: Reported on 1/3/2025) 90 tablet 0    EPINEPHrine 0.3 MG/0.3ML Injection Solution Auto-injector Inject 0.3 mL (1 each total) as directed one time.      LORazepam 1 MG Oral Tab Take 1 tablet (1 mg total) by mouth 2 (two) times daily as needed for Anxiety. 30 tablet 0    albuterol 108 (90 Base) MCG/ACT Inhalation Aero Soln Inhale 2 puffs into the lungs every 6 (six) hours as needed for Wheezing. 3 each 1    CYCLOBENZAPRINE 10 MG Oral Tab TAKE 1 TABLET BY MOUTH NIGHTLY AS NEEDED FOR MUSCLE SPASMS 30 tablet 5    predniSONE 50 MG Oral Tab Take 1 tablet (50 mg total) by mouth as needed. For allergy flare-up (Patient not taking: Reported on 1/3/2025)      aspirin  MG Oral Tab EC TAKE ONE TABLET BY MOUTH ONE TIME DAILY POST OPERATIVELY      Fexofenadine HCl 180 MG Oral Tab Take 1 tablet (180 mg total) by mouth daily. (Patient not taking: Reported on 1/3/2025)      Naloxone HCl 4 MG/0.1ML Nasal Liquid 4 mg by Nasal route as needed. If patient remains unresponsive, repeat dose in other nostril 2-5 minutes after first dose. 1 kit 0    famoTIDine 20 MG Oral Tab Take 1 tablet (20 mg total) by mouth 2 (two) times daily.      Ketotifen Fumarate (KETOTIFEN HYDROGEN FUMARATE) Does not apply Powder 2 mg. (Patient not taking: Reported on 1/3/2025)      psyllium 28 % Oral Powd Pack Take 1 packet by mouth as needed.      cetirizine 10 MG Oral Tab Take 1-2 tablets (10-20 mg total) by mouth daily. (Patient not taking: Reported on 1/3/2025)      caffeine 200 MG Oral Tab Take 1 tablet (200 mg total) by mouth daily.       Allergies:Allergies[1]    Objective:   Physical Exam  Constitutional:       General: She is not in acute distress.  Neurological:      Mental Status: She is alert.         Assessment & Plan:   1. Chronic pain syndrome    2. Osteoarthritis of lumbar spine, unspecified spinal osteoarthritis complication status    3. Tear of medial meniscus of right knee,  unspecified tear type, unspecified whether old or current tear, initial encounter    4. Chronic pain of right knee    5. Rupture of anterior cruciate ligament of right knee, initial encounter    chronic stable issue, continue present management with observation and medications as noted  Medications were refilled today and sent to her corresponding pharmacy  Agree with doing MRI of the lumbar spine and right hip if no considerable improvement after the next few weeks.  We may need to refer her to orthopedist if necessary    This note was prepared using Dragon Medical voice recognition dictation software. As a result errors may occur. When identified these errors have been corrected. While every attempt is made to correct errors during dictation discrepancies may still exist            No orders of the defined types were placed in this encounter.      Meds This Visit:  Requested Prescriptions     Signed Prescriptions Disp Refills    traMADol 50 MG Oral Tab 240 tablet 0     Sig: TAKE 1 TO 2 TABLETS ( MG TOTAL) BY MOUTH EVERY 4 TO 6 HOURS AS NEEDED FOR PAIN.       Imaging & Referrals:  None         [1]   Allergies  Allergen Reactions    Hydrochlorothiazide SWELLING    Latex ANAPHYLAXIS    Latex ANAPHYLAXIS and RASH    Morphine SWELLING     Legs swelling      Percocet [Oxycodone-Acetaminophen] SWELLING    Phentermine HIVES and SWELLING    Penicillin G RASH

## 2025-04-11 DIAGNOSIS — M47.816 OSTEOARTHRITIS OF LUMBAR SPINE, UNSPECIFIED SPINAL OSTEOARTHRITIS COMPLICATION STATUS: ICD-10-CM

## 2025-04-11 RX ORDER — HYDROCODONE BITARTRATE AND ACETAMINOPHEN 10; 325 MG/1; MG/1
1 TABLET ORAL 4 TIMES DAILY PRN
Qty: 120 TABLET | Refills: 0 | Status: SHIPPED | OUTPATIENT
Start: 2025-04-11 | End: 2025-05-11

## 2025-04-11 NOTE — TELEPHONE ENCOUNTER
Patient is calling to request a refill.    HYDROcodone-acetaminophen (NORCO)  MG Oral Tab     [facility][id number] - [place], [place] - [address] [place] [address] AT [facility], [phone], [phone]

## 2025-04-11 NOTE — TELEPHONE ENCOUNTER
Requesting Norco 10/325mg  Last OV: 3/27/25 Telemedicine  RTC: not noted  Last Rx'd 3/17/25 #120 with 0 refills    No future appointments.    Per [place] , last disp[place] 3/17/25 #120    Controlled med:  Rx pended and routed for approval/denial

## 2025-04-24 DIAGNOSIS — M47.816 OSTEOARTHRITIS OF LUMBAR SPINE, UNSPECIFIED SPINAL OSTEOARTHRITIS COMPLICATION STATUS: ICD-10-CM

## 2025-04-24 DIAGNOSIS — M25.561 CHRONIC PAIN OF RIGHT KNEE: ICD-10-CM

## 2025-04-24 DIAGNOSIS — S83.511A RUPTURE OF ANTERIOR CRUCIATE LIGAMENT OF RIGHT KNEE, INITIAL ENCOUNTER: ICD-10-CM

## 2025-04-24 DIAGNOSIS — G89.29 CHRONIC PAIN OF RIGHT KNEE: ICD-10-CM

## 2025-04-24 DIAGNOSIS — G89.4 CHRONIC PAIN SYNDROME: ICD-10-CM

## 2025-04-24 DIAGNOSIS — S83.241A TEAR OF MEDIAL MENISCUS OF RIGHT KNEE, UNSPECIFIED TEAR TYPE, UNSPECIFIED WHETHER OLD OR CURRENT TEAR, INITIAL ENCOUNTER: ICD-10-CM

## 2025-04-24 RX ORDER — TRAMADOL HYDROCHLORIDE 50 MG/1
TABLET ORAL
Qty: 240 TABLET | Refills: 0 | Status: SHIPPED | OUTPATIENT
Start: 2025-04-24

## 2025-04-24 NOTE — TELEPHONE ENCOUNTER
Requesting Tramadol 50mg  Last OV: 3/27/25 Telemedicine  RTC: prn  Last Rx'd 3/27/25 #240 with 0 refills    No future appointments.    [place] , last [place] 3/29/25 #240    Controlled med:  Rx pended and routed for approval/denial

## 2025-05-01 ENCOUNTER — TELEPHONE (OUTPATIENT)
Dept: FAMILY MEDICINE CLINIC | Facility: CLINIC | Age: 55
End: 2025-05-01

## 2025-05-01 NOTE — TELEPHONE ENCOUNTER
Received incoming fax from [employer].    Forms were received by:__x_Fax         ___MyChart         ___Dropped off         ___Mail    From: [employer]        Reason for Form Completion:FMLA      MERA Signed    ___Yes       __x_No    Fee Collected    ___Yes       __x_No    Emailed to self and Forms Department.y  Sent inner office mail to Forms Department.y

## 2025-05-07 ENCOUNTER — VIRTUAL PHONE E/M (OUTPATIENT)
Dept: FAMILY MEDICINE CLINIC | Facility: CLINIC | Age: 55
End: 2025-05-07
Payer: COMMERCIAL

## 2025-05-07 DIAGNOSIS — Z12.39 ENCOUNTER FOR BREAST CANCER SCREENING USING NON-MAMMOGRAM MODALITY: ICD-10-CM

## 2025-05-07 DIAGNOSIS — M47.816 OSTEOARTHRITIS OF LUMBAR SPINE, UNSPECIFIED SPINAL OSTEOARTHRITIS COMPLICATION STATUS: ICD-10-CM

## 2025-05-07 PROCEDURE — 98013 SYNCH AUDIO-ONLY EST LOW 20: CPT | Performed by: FAMILY MEDICINE

## 2025-05-07 RX ORDER — HYDROCODONE BITARTRATE AND ACETAMINOPHEN 10; 325 MG/1; MG/1
1-2 TABLET ORAL 4 TIMES DAILY PRN
Qty: 120 TABLET | Refills: 0 | Status: SHIPPED | OUTPATIENT
Start: 2025-05-07 | End: 2025-06-06

## 2025-05-07 NOTE — PROGRESS NOTES
Subjective:   Patient ID: Leonie Wood is a 54 year old female.    HPI  Ms. Wood is a pleasant 54-year-old female with history of osteoarthritis of the lumbar spine, lumbar DJD, arthritis of the hips and knee presenting for phone visit for medication refills.  She would need her [place] was refilled.  She actually said that her hip pain is feeling much better after she stopped taking prednisone.    I had reviewed past medical and family histories together with allergy and medication lists documented.    History/Other:   Review of Systems   Musculoskeletal:  Positive for arthralgias and back pain.     Current Medications[1]  Allergies:Allergies[2]    Objective:   Physical Exam  Constitutional:       General: She is not in acute distress.  Neurological:      Mental Status: She is alert.         Assessment & Plan:   1. Osteoarthritis of lumbar spine, unspecified spinal osteoarthritis complication status    2. Encounter for breast cancer screening using non-mammogram modality    -Will refill Norco as directed and will send to her pharmacy  - Follow-up in the office after 3 months    This note was prepared using Dragon Medical voice recognition dictation software. As a result errors may occur. When identified these errors have been corrected. While every attempt is made to correct errors during dictation discrepancies may still exist            No orders of the defined types were placed in this encounter.      Meds This Visit:  Requested Prescriptions     Signed Prescriptions Disp Refills    HYDROcodone-acetaminophen (NORCO)  MG Oral Tab 120 tablet 0     Sig: Take 1-2 tablets by mouth 4 (four) times daily as needed for Pain. Ok to refill early today       Imaging & Referrals:  US BREAST BILATERAL COMPLETE (CPT=76641-50)         [1]   Current Outpatient Medications   Medication Sig Dispense Refill    HYDROcodone-acetaminophen (NORCO)  MG Oral Tab Take 1-2 tablets by mouth 4 (four) times daily as  needed for Pain. Ok to refill early today 120 tablet 0    TRAMADOL 50 MG Oral Tab TAKE 1 TO 2 TABLETS ( MG TOTAL) BY MOUTH EVERY 4 TO 6 HOURS AS NEEDED FOR PAIN. 240 tablet 0    METOPROLOL SUCCINATE  MG Oral Tablet 24 Hr TAKE 1 TABLET BY MOUTH EVERY DAY 90 tablet 1    metoprolol succinate ER 50 MG Oral Tablet 24 Hr Take 1 tablet (50 mg total) by mouth daily. 90 tablet 1    Tirzepatide-Weight Management (ZEPBOUND) 2.5 MG/0.5ML Subcutaneous Solution Auto-injector Inject 2.5 mg into the skin once a week. 2 mL 0    predniSONE 1 MG Oral Tab Take 1 tablet (1 mg total) by mouth daily with breakfast. 30 tablet 0    ibuprofen 200 MG Oral Tab Take 1 tablet (200 mg total) by mouth every 6 (six) hours as needed for Pain.      hydrALAZINE 25 MG Oral Tab Take 1 tablet (25 mg total) by mouth in the morning and 1 tablet (25 mg total) before bedtime. 60 tablet 1    rosuvastatin 20 MG Oral Tab Take 1 tablet (20 mg total) by mouth nightly. (Patient not taking: Reported on 1/3/2025) 90 tablet 0    EPINEPHrine 0.3 MG/0.3ML Injection Solution Auto-injector Inject 0.3 mL (1 each total) as directed one time.      LORazepam 1 MG Oral Tab Take 1 tablet (1 mg total) by mouth 2 (two) times daily as needed for Anxiety. 30 tablet 0    albuterol 108 (90 Base) MCG/ACT Inhalation Aero Soln Inhale 2 puffs into the lungs every 6 (six) hours as needed for Wheezing. 3 each 1    CYCLOBENZAPRINE 10 MG Oral Tab TAKE 1 TABLET BY MOUTH NIGHTLY AS NEEDED FOR MUSCLE SPASMS 30 tablet 5    predniSONE 50 MG Oral Tab Take 1 tablet (50 mg total) by mouth as needed. For allergy flare-up (Patient not taking: Reported on 1/3/2025)      aspirin  MG Oral Tab EC TAKE ONE TABLET BY MOUTH ONE TIME DAILY POST OPERATIVELY      Fexofenadine HCl 180 MG Oral Tab Take 1 tablet (180 mg total) by mouth daily. (Patient not taking: Reported on 1/3/2025)      Naloxone HCl 4 MG/0.1ML Nasal Liquid 4 mg by Nasal route as needed. If patient remains unresponsive, repeat  dose in other nostril 2-5 minutes after first dose. 1 kit 0    famoTIDine 20 MG Oral Tab Take 1 tablet (20 mg total) by mouth 2 (two) times daily.      Ketotifen Fumarate (KETOTIFEN HYDROGEN FUMARATE) Does not apply Powder 2 mg. (Patient not taking: Reported on 1/3/2025)      psyllium 28 % Oral Powd Pack Take 1 packet by mouth as needed.      cetirizine 10 MG Oral Tab Take 1-2 tablets (10-20 mg total) by mouth daily. (Patient not taking: Reported on 1/3/2025)      caffeine 200 MG Oral Tab Take 1 tablet (200 mg total) by mouth daily.     [2]   Allergies  Allergen Reactions    Hydrochlorothiazide SWELLING    Latex ANAPHYLAXIS    Latex ANAPHYLAXIS and RASH    Morphine SWELLING     Legs swelling      Percocet [Oxycodone-Acetaminophen] SWELLING    Phentermine HIVES and SWELLING    Penicillin G RASH

## 2025-05-07 NOTE — PROGRESS NOTES
Virtual Telephone Check-In    Leonie Wood verbally consents to a Virtual/Telephone Check-In visit on 05/07/25.  Patient has been referred to the [facility] website at [email or web address] to review the yearly Consent to Treat document.    Patient understands and accepts financial responsibility for any deductible, co-insurance and/or co-pays associated with this service.    Duration of the service: 20 minutes      Summary of topics discussed:             Cole Garvin MD

## 2025-05-09 NOTE — TELEPHONE ENCOUNTER
Spouses Family Medical Leave Act with valid authorization received via fax. Logged for processing.

## 2025-05-13 NOTE — TELEPHONE ENCOUNTER
Patient calling back to provide details. Details obtained, patient states Family Medical Leave Act is for spouse re-cert, everything the same as in the past.  notified.   Type of Leave: Intermittent Family Medical Leave Act for spouse  Reason for Leave: autoimmune disease ;  to care for patient.   Start date of leave: 03/25/25   End date of leave: 09/20/25  How many flare ups per month/length?:  2 flare ups per week each flare up lasting up to 2 days.   How many appts per month/length?:   N/A  Was Fee and Turnaround info Given?:

## 2025-05-14 NOTE — TELEPHONE ENCOUNTER
Dr. Garvin,    *ACKNOWLEDGE BUTTON HAS BEEN MOVED TO THE TOP RIGHT RIBBON*    Please sign off on form if you agree to: recertification Family Medical Leave Act for spouse    -Signature page will be the first page scanned  -From your Inbasket, Highlight the patient and click Chart   -Double click the 5/1/2025 Forms Completion telephone encounter  -Scroll down to the Media section   -Click the blue Hyperlink: recertification Family Medical Leave Act spouse Dr. Garvin 5/14/25  -Click Acknowledge located in the top right ribbon/menu   -Drag the mouse into the blank space of the document and a + sign will appear. Left click to   electronically sign the document.  -Once signed, simply exit out of the screen and you signature will be saved.     Thank you,    Dian

## 2025-05-15 NOTE — TELEPHONE ENCOUNTER
Form completed faxed to Michael fax [phone] waiting on confirmation copy sent to patient via [employer].  Fax successful and confirmation scanned into this encounter.

## 2025-05-21 DIAGNOSIS — S83.511A RUPTURE OF ANTERIOR CRUCIATE LIGAMENT OF RIGHT KNEE, INITIAL ENCOUNTER: ICD-10-CM

## 2025-05-21 DIAGNOSIS — G89.29 CHRONIC PAIN OF RIGHT KNEE: ICD-10-CM

## 2025-05-21 DIAGNOSIS — S83.241A TEAR OF MEDIAL MENISCUS OF RIGHT KNEE, UNSPECIFIED TEAR TYPE, UNSPECIFIED WHETHER OLD OR CURRENT TEAR, INITIAL ENCOUNTER: ICD-10-CM

## 2025-05-21 DIAGNOSIS — M25.561 CHRONIC PAIN OF RIGHT KNEE: ICD-10-CM

## 2025-05-21 DIAGNOSIS — M47.816 OSTEOARTHRITIS OF LUMBAR SPINE, UNSPECIFIED SPINAL OSTEOARTHRITIS COMPLICATION STATUS: ICD-10-CM

## 2025-05-21 DIAGNOSIS — G89.4 CHRONIC PAIN SYNDROME: ICD-10-CM

## 2025-05-21 RX ORDER — TRAMADOL HYDROCHLORIDE 50 MG/1
TABLET ORAL
Qty: 240 TABLET | Refills: 0 | Status: SHIPPED | OUTPATIENT
Start: 2025-05-21

## 2025-05-21 NOTE — TELEPHONE ENCOUNTER
Requesting Tramadol 50mg  Last OV: 5/7/25 Telemedicine  RTC: 3 months  Last Rx'd 4/24/25 #240 with 0 refills    No future appointments.    [place] , [place] 4/26/25 #240    Controlled med:  Rx pended and routed for approval/denial

## 2025-05-29 ENCOUNTER — TELEMEDICINE (OUTPATIENT)
Dept: FAMILY MEDICINE CLINIC | Facility: CLINIC | Age: 55
End: 2025-05-29
Payer: COMMERCIAL

## 2025-05-29 ENCOUNTER — PATIENT MESSAGE (OUTPATIENT)
Dept: FAMILY MEDICINE CLINIC | Facility: CLINIC | Age: 55
End: 2025-05-29

## 2025-05-29 DIAGNOSIS — M47.816 OSTEOARTHRITIS OF LUMBAR SPINE, UNSPECIFIED SPINAL OSTEOARTHRITIS COMPLICATION STATUS: ICD-10-CM

## 2025-05-29 PROCEDURE — 98005 SYNCH AUDIO-VIDEO EST LOW 20: CPT | Performed by: FAMILY MEDICINE

## 2025-05-29 RX ORDER — HYDROCODONE BITARTRATE AND ACETAMINOPHEN 10; 325 MG/1; MG/1
1-2 TABLET ORAL 4 TIMES DAILY PRN
Qty: 120 TABLET | Refills: 0 | Status: SHIPPED | OUTPATIENT
Start: 2025-06-06 | End: 2025-05-30

## 2025-05-29 RX ORDER — HYDROCODONE BITARTRATE AND ACETAMINOPHEN 10; 325 MG/1; MG/1
1-2 TABLET ORAL 4 TIMES DAILY PRN
Qty: 120 TABLET | Refills: 0 | Status: SHIPPED | OUTPATIENT
Start: 2025-06-06 | End: 2025-05-29

## 2025-05-29 NOTE — TELEPHONE ENCOUNTER
Future Appointments   Date Time Provider Department Center   5/29/2025  5:00 PM Cole Garvin MD [facility] [address] [facility] [address]     Patient states Norco prescription has note to dispense on 6-6 but also notes can dispense early today. Pharmacy requesting clarification, patient scheduled VV to discuss back  pain

## 2025-05-29 NOTE — TELEPHONE ENCOUNTER
Requesting Norco   Last OV: 5/7/25 Telemedicine  RTC: 3 months  Last Rx'd 5/7/25 #120 with 0 refills    No future appointments.    Per [place] , last disp[place] 5/7/25 #120    Controlled med:  Rx pended and routed for approval/denial

## 2025-05-29 NOTE — PROGRESS NOTES
Subjective:   Patient ID: Leonie Wood is a 54 year old female.    HPI  Ms. Wood is a pleasant 54-year-old female with history of osteoarthritis of the lumbar spine, lumbar DJD, arthritis of the hips and knee presenting for video visit today for medication request.  She has been on Norco regimen to help manage her pain which has been for the past several years.  Her current pharmacy had asked her to call us as we did send a new prescription for Norco but was dated for June 6.  She would need to have this refilled early.  She reports that her son had started baseball and has been playing with him and had reaggravated his back.  There were times in the past that she will need early refills which we have accommodated.    I had reviewed past medical and family histories together with allergy and medication lists documented.    History/Other:   Review of Systems   Musculoskeletal:  Positive for arthralgias and back pain.     Current Medications[1]  Allergies:Allergies[2]    Objective:   Physical Exam  Constitutional:       General: She is not in acute distress.  Neurological:      Mental Status: She is alert.         Assessment & Plan:   1. Osteoarthritis of lumbar spine, unspecified spinal osteoarthritis complication status    -Will resend prescription to the pharmacy approving for early refills.  Follow-up as scheduled or as needed    This note was prepared using Dragon Medical voice recognition dictation software. As a result errors may occur. When identified these errors have been corrected. While every attempt is made to correct errors during dictation discrepancies may still exist            No orders of the defined types were placed in this encounter.      Meds This Visit:  Requested Prescriptions     Signed Prescriptions Disp Refills    HYDROcodone-acetaminophen (NORCO)  MG Oral Tab 120 tablet 0     Sig: Take 1-2 tablets by mouth 4 (four) times daily as needed for Pain. Ok to refill early today  Discussed with patient today       Imaging & Referrals:  None         [1]   Current Outpatient Medications   Medication Sig Dispense Refill    [START ON 6/6/2025] HYDROcodone-acetaminophen (NORCO)  MG Oral Tab Take 1-2 tablets by mouth 4 (four) times daily as needed for Pain. Ok to refill early today Discussed with patient today 120 tablet 0    traMADol 50 MG Oral Tab TAKE 1 TO 2 TABLETS ( MG TOTAL) BY MOUTH EVERY 4 TO 6 HOURS AS NEEDED FOR PAIN FOR 30 DAYS 240 tablet 0    METOPROLOL SUCCINATE  MG Oral Tablet 24 Hr TAKE 1 TABLET BY MOUTH EVERY DAY 90 tablet 1    metoprolol succinate ER 50 MG Oral Tablet 24 Hr Take 1 tablet (50 mg total) by mouth daily. 90 tablet 1    Tirzepatide-Weight Management (ZEPBOUND) 2.5 MG/0.5ML Subcutaneous Solution Auto-injector Inject 2.5 mg into the skin once a week. 2 mL 0    predniSONE 1 MG Oral Tab Take 1 tablet (1 mg total) by mouth daily with breakfast. 30 tablet 0    ibuprofen 200 MG Oral Tab Take 1 tablet (200 mg total) by mouth every 6 (six) hours as needed for Pain.      hydrALAZINE 25 MG Oral Tab Take 1 tablet (25 mg total) by mouth in the morning and 1 tablet (25 mg total) before bedtime. 60 tablet 1    rosuvastatin 20 MG Oral Tab Take 1 tablet (20 mg total) by mouth nightly. (Patient not taking: Reported on 1/3/2025) 90 tablet 0    EPINEPHrine 0.3 MG/0.3ML Injection Solution Auto-injector Inject 0.3 mL (1 each total) as directed one time.      LORazepam 1 MG Oral Tab Take 1 tablet (1 mg total) by mouth 2 (two) times daily as needed for Anxiety. 30 tablet 0    albuterol 108 (90 Base) MCG/ACT Inhalation Aero Soln Inhale 2 puffs into the lungs every 6 (six) hours as needed for Wheezing. 3 each 1    CYCLOBENZAPRINE 10 MG Oral Tab TAKE 1 TABLET BY MOUTH NIGHTLY AS NEEDED FOR MUSCLE SPASMS 30 tablet 5    predniSONE 50 MG Oral Tab Take 1 tablet (50 mg total) by mouth as needed. For allergy flare-up (Patient not taking: Reported on 1/3/2025)      aspirin  MG  Oral Tab EC TAKE ONE TABLET BY MOUTH ONE TIME DAILY POST OPERATIVELY      Fexofenadine HCl 180 MG Oral Tab Take 1 tablet (180 mg total) by mouth daily. (Patient not taking: Reported on 1/3/2025)      Naloxone HCl 4 MG/0.1ML Nasal Liquid 4 mg by Nasal route as needed. If patient remains unresponsive, repeat dose in other nostril 2-5 minutes after first dose. 1 kit 0    famoTIDine 20 MG Oral Tab Take 1 tablet (20 mg total) by mouth 2 (two) times daily.      Ketotifen Fumarate (KETOTIFEN HYDROGEN FUMARATE) Does not apply Powder 2 mg. (Patient not taking: Reported on 1/3/2025)      psyllium 28 % Oral Powd Pack Take 1 packet by mouth as needed.      cetirizine 10 MG Oral Tab Take 1-2 tablets (10-20 mg total) by mouth daily. (Patient not taking: Reported on 1/3/2025)      caffeine 200 MG Oral Tab Take 1 tablet (200 mg total) by mouth daily.     [2]   Allergies  Allergen Reactions    Hydrochlorothiazide SWELLING    Latex ANAPHYLAXIS    Latex ANAPHYLAXIS and RASH    Morphine SWELLING     Legs swelling      Percocet [Oxycodone-Acetaminophen] SWELLING    Phentermine HIVES and SWELLING    Penicillin G RASH

## 2025-05-30 RX ORDER — HYDROCODONE BITARTRATE AND ACETAMINOPHEN 10; 325 MG/1; MG/1
1-2 TABLET ORAL 4 TIMES DAILY PRN
Qty: 120 TABLET | Refills: 0 | Status: SHIPPED | OUTPATIENT
Start: 2025-05-30

## 2025-06-13 RX ORDER — CYCLOBENZAPRINE HCL 10 MG
10 TABLET ORAL NIGHTLY PRN
Qty: 30 TABLET | Refills: 5 | Status: SHIPPED | OUTPATIENT
Start: 2025-06-13

## 2025-06-13 NOTE — TELEPHONE ENCOUNTER
Requesting Cyclobenzaprine 10mg  Last OV: 5/29/25  RTC: prn  Last Rx'd 4/24/23 #30 with 5 refills    No future appointments.    Rx not prescribed since 2023?

## 2025-06-18 DIAGNOSIS — G89.29 CHRONIC PAIN OF RIGHT KNEE: ICD-10-CM

## 2025-06-18 DIAGNOSIS — M25.561 CHRONIC PAIN OF RIGHT KNEE: ICD-10-CM

## 2025-06-18 DIAGNOSIS — M47.816 OSTEOARTHRITIS OF LUMBAR SPINE, UNSPECIFIED SPINAL OSTEOARTHRITIS COMPLICATION STATUS: ICD-10-CM

## 2025-06-18 DIAGNOSIS — G89.4 CHRONIC PAIN SYNDROME: ICD-10-CM

## 2025-06-18 DIAGNOSIS — S83.511A RUPTURE OF ANTERIOR CRUCIATE LIGAMENT OF RIGHT KNEE, INITIAL ENCOUNTER: ICD-10-CM

## 2025-06-18 DIAGNOSIS — S83.241A TEAR OF MEDIAL MENISCUS OF RIGHT KNEE, UNSPECIFIED TEAR TYPE, UNSPECIFIED WHETHER OLD OR CURRENT TEAR, INITIAL ENCOUNTER: ICD-10-CM

## 2025-06-18 NOTE — TELEPHONE ENCOUNTER
Received request for medical records from [address] [place] [address] elizabeth [place] Phone [phone] ext [phone] Fax number [phone].  Sent 9 pages of records to [facility] attention Sandy [place], faxed and confrimed English

## 2025-06-19 RX ORDER — TRAMADOL HYDROCHLORIDE 50 MG/1
TABLET ORAL
Qty: 240 TABLET | Refills: 0 | Status: SHIPPED | OUTPATIENT
Start: 2025-06-19

## 2025-06-19 NOTE — TELEPHONE ENCOUNTER
Requesting Tramadol 50mg  Last OV: 5/29/25 Telemedicine  RTC: prn  Last Rx'd 5/21/25 #240 with 0 refills    No future appointments.    [place] , last disp[place] 5/24/25 #240    Controlled med:  Rx pended and routed for approval/denial

## 2025-06-25 DIAGNOSIS — M47.816 OSTEOARTHRITIS OF LUMBAR SPINE, UNSPECIFIED SPINAL OSTEOARTHRITIS COMPLICATION STATUS: ICD-10-CM

## 2025-06-25 RX ORDER — HYDROCODONE BITARTRATE AND ACETAMINOPHEN 10; 325 MG/1; MG/1
1-2 TABLET ORAL 4 TIMES DAILY PRN
Qty: 120 TABLET | Refills: 0 | Status: SHIPPED | OUTPATIENT
Start: 2025-06-25

## 2025-06-25 NOTE — TELEPHONE ENCOUNTER
Requesting Norco 10/325mg  Last OV: 5/29/25 Telemedicine  RTC: prn  Last Rx'd 5/30/25 #120 with 0 refills    No future appointments.    [place] , [place] 5/30/25 #120    Controlled med:  Rx pended and routed for approval/denial

## 2025-07-10 ENCOUNTER — VIRTUAL PHONE E/M (OUTPATIENT)
Dept: FAMILY MEDICINE CLINIC | Facility: CLINIC | Age: 55
End: 2025-07-10
Payer: COMMERCIAL

## 2025-07-10 ENCOUNTER — TELEPHONE (OUTPATIENT)
Dept: FAMILY MEDICINE CLINIC | Facility: CLINIC | Age: 55
End: 2025-07-10

## 2025-07-10 DIAGNOSIS — E66.9 OBESITY (BMI 30-39.9): ICD-10-CM

## 2025-07-10 DIAGNOSIS — I10 PRIMARY HYPERTENSION: ICD-10-CM

## 2025-07-10 DIAGNOSIS — M47.816 OSTEOARTHRITIS OF LUMBAR SPINE, UNSPECIFIED SPINAL OSTEOARTHRITIS COMPLICATION STATUS: ICD-10-CM

## 2025-07-10 DIAGNOSIS — M25.551 RIGHT HIP PAIN: Primary | ICD-10-CM

## 2025-07-10 RX ORDER — ORAL SEMAGLUTIDE 3 MG/1
3 TABLET ORAL DAILY
Qty: 30 TABLET | Refills: 0 | Status: SHIPPED | OUTPATIENT
Start: 2025-07-10 | End: 2025-08-09

## 2025-07-10 RX ORDER — HYDROCODONE BITARTRATE AND ACETAMINOPHEN 10; 325 MG/1; MG/1
1-2 TABLET ORAL 4 TIMES DAILY PRN
Qty: 120 TABLET | Refills: 0 | Status: SHIPPED | OUTPATIENT
Start: 2025-07-10

## 2025-07-10 NOTE — PROGRESS NOTES
Subjective:   Patient ID: Leonie Wood is a 54 year old female.    HPI  Ms. Wood is a pleasant 54-year-old female with history of osteoarthritis of the lumbar spine, lumbar DJD, arthritis of the hips and knee presenting for medication refills for her Norco.  She has been taking this for chronic pain secondary to osteoarthritis of the lumbar spine and the hips.  Recently she has been gaining weight and has been having more right hip pain.  She has a 5-year-old son that she has been playing with and has been active.    I did prescribe her Zepbound to help manage her weight but was not but approved by insurance but she was told that the Rybelsus might be a better option for her and might be covered by her insurance.    I had reviewed past medical and family histories together with allergy and medication lists documented.    History/Other:   Review of Systems  Musculoskeletal:  Positive for arthralgias and back pain.      Current Medications[1]  Allergies:Allergies[2]    Objective:   Physical Exam  Constitutional:       General: She is not in acute distress.  Neurological:      Mental Status: She is alert.      Assessment & Plan:   1. Right hip pain    2. Osteoarthritis of lumbar spine, unspecified spinal osteoarthritis complication status    3. Obesity (BMI 30-39.9)    4. Primary hypertension    -Will start on Rybelsus 3 mg daily and see if insurance will approve.  - Discussed possible side effects from this medications which include allergic reaction, nausea, vomiting, abdominal pain, diarrhea, constipation  - Will put an early refill for Norco  - Will discuss pain contract with her in August when I see her in the office.      This note was prepared using Dragon Medical voice recognition dictation software. As a result errors may occur. When identified these errors have been corrected. While every attempt is made to correct errors during dictation discrepancies may still exist            No orders of the  defined types were placed in this encounter.      Meds This Visit:  Requested Prescriptions     Signed Prescriptions Disp Refills    Semaglutide (RYBELSUS) 3 MG Oral Tab 30 tablet 0     Sig: Take 3 mg by mouth daily.    HYDROcodone-acetaminophen (NORCO)  MG Oral Tab 120 tablet 0     Sig: Take 1-2 tablets by mouth 4 (four) times daily as needed for Pain.       Imaging & Referrals:  None         [1]   Current Outpatient Medications   Medication Sig Dispense Refill    Semaglutide (RYBELSUS) 3 MG Oral Tab Take 3 mg by mouth daily. 30 tablet 0    HYDROcodone-acetaminophen (NORCO)  MG Oral Tab Take 1-2 tablets by mouth 4 (four) times daily as needed for Pain. 120 tablet 0    TRAMADOL 50 MG Oral Tab TAKE 1 TO 2 TABLETS ( MG TOTAL) BY MOUTH EVERY 4 TO 6 HOURS AS NEEDED FOR PAIN FOR 30 DAYS 240 tablet 0    CYCLOBENZAPRINE 10 MG Oral Tab TAKE 1 TABLET BY MOUTH NIGHTLY AS NEEDED FOR MUSCLE SPASMS 30 tablet 5    METOPROLOL SUCCINATE  MG Oral Tablet 24 Hr TAKE 1 TABLET BY MOUTH EVERY DAY 90 tablet 1    metoprolol succinate ER 50 MG Oral Tablet 24 Hr Take 1 tablet (50 mg total) by mouth daily. 90 tablet 1    Tirzepatide-Weight Management (ZEPBOUND) 2.5 MG/0.5ML Subcutaneous Solution Auto-injector Inject 2.5 mg into the skin once a week. 2 mL 0    predniSONE 1 MG Oral Tab Take 1 tablet (1 mg total) by mouth daily with breakfast. 30 tablet 0    ibuprofen 200 MG Oral Tab Take 1 tablet (200 mg total) by mouth every 6 (six) hours as needed for Pain.      hydrALAZINE 25 MG Oral Tab Take 1 tablet (25 mg total) by mouth in the morning and 1 tablet (25 mg total) before bedtime. 60 tablet 1    rosuvastatin 20 MG Oral Tab Take 1 tablet (20 mg total) by mouth nightly. (Patient not taking: Reported on 1/3/2025) 90 tablet 0    EPINEPHrine 0.3 MG/0.3ML Injection Solution Auto-injector Inject 0.3 mL (1 each total) as directed one time.      LORazepam 1 MG Oral Tab Take 1 tablet (1 mg total) by mouth 2 (two) times daily as  needed for Anxiety. 30 tablet 0    albuterol 108 (90 Base) MCG/ACT Inhalation Aero Soln Inhale 2 puffs into the lungs every 6 (six) hours as needed for Wheezing. 3 each 1    predniSONE 50 MG Oral Tab Take 1 tablet (50 mg total) by mouth as needed. For allergy flare-up (Patient not taking: Reported on 1/3/2025)      aspirin  MG Oral Tab EC TAKE ONE TABLET BY MOUTH ONE TIME DAILY POST OPERATIVELY      Fexofenadine HCl 180 MG Oral Tab Take 1 tablet (180 mg total) by mouth daily. (Patient not taking: Reported on 1/3/2025)      Naloxone HCl 4 MG/0.1ML Nasal Liquid 4 mg by Nasal route as needed. If patient remains unresponsive, repeat dose in other nostril 2-5 minutes after first dose. 1 kit 0    famoTIDine 20 MG Oral Tab Take 1 tablet (20 mg total) by mouth 2 (two) times daily.      Ketotifen Fumarate (KETOTIFEN HYDROGEN FUMARATE) Does not apply Powder 2 mg. (Patient not taking: Reported on 1/3/2025)      psyllium 28 % Oral Powd Pack Take 1 packet by mouth as needed.      cetirizine 10 MG Oral Tab Take 1-2 tablets (10-20 mg total) by mouth daily. (Patient not taking: Reported on 1/3/2025)      caffeine 200 MG Oral Tab Take 1 tablet (200 mg total) by mouth daily.     [2]   Allergies  Allergen Reactions    Hydrochlorothiazide SWELLING    Latex ANAPHYLAXIS    Latex ANAPHYLAXIS and RASH    Morphine SWELLING     Legs swelling      Percocet [Oxycodone-Acetaminophen] SWELLING    Phentermine HIVES and SWELLING    Penicillin G RASH

## 2025-07-10 NOTE — PROGRESS NOTES
Virtual Telephone Check-In    Leonie Wood verbally consents to a Virtual/Telephone Check-In visit on 07/10/25.  Patient has been referred to the [facility] website at [email or web address] to review the yearly Consent to Treat document.    Patient understands and accepts financial responsibility for any deductible, co-insurance and/or co-pays associated with this service.    Duration of the service: 20 minutes      Summary of topics discussed:             Cole Garvin MD

## 2025-07-15 NOTE — TELEPHONE ENCOUNTER
Denied   7/11/2025 10:55 AM  Appeal supported: No Appeal instructions: Appeals are not supported through ePA. Please refer to the fax case notice for appeals information and instructions.   Note from payer: Request Reference Number: [id number].  RYBELSUS     TAB 3MG is denied for not meeting the prior authorization requirement(s).  Details of this decision are in the notice attached below or have been faxed to you.   Payer: [employer] Case ID: [id number]

## 2025-07-16 DIAGNOSIS — S83.241A TEAR OF MEDIAL MENISCUS OF RIGHT KNEE, UNSPECIFIED TEAR TYPE, UNSPECIFIED WHETHER OLD OR CURRENT TEAR, INITIAL ENCOUNTER: ICD-10-CM

## 2025-07-16 DIAGNOSIS — M25.561 CHRONIC PAIN OF RIGHT KNEE: ICD-10-CM

## 2025-07-16 DIAGNOSIS — M47.816 OSTEOARTHRITIS OF LUMBAR SPINE, UNSPECIFIED SPINAL OSTEOARTHRITIS COMPLICATION STATUS: ICD-10-CM

## 2025-07-16 DIAGNOSIS — G89.4 CHRONIC PAIN SYNDROME: ICD-10-CM

## 2025-07-16 DIAGNOSIS — G89.29 CHRONIC PAIN OF RIGHT KNEE: ICD-10-CM

## 2025-07-16 DIAGNOSIS — S83.511A RUPTURE OF ANTERIOR CRUCIATE LIGAMENT OF RIGHT KNEE, INITIAL ENCOUNTER: ICD-10-CM

## 2025-07-16 RX ORDER — TRAMADOL HYDROCHLORIDE 50 MG/1
TABLET ORAL
Qty: 240 TABLET | Refills: 0 | Status: SHIPPED | OUTPATIENT
Start: 2025-07-16

## 2025-07-16 NOTE — TELEPHONE ENCOUNTER
Requesting Tramadol 50mg  Last OV: 7/10/25 Telemedicine  RTC: not noted  Last Rx'd 6/19/25 #240 with 0 refills    Future Appointments   Date Time Provider Department Center   8/11/2025 11:30 AM Cole Garvin MD [facility] [address] [facility] [address]       [place] , last dispensed 6/21/25 #240    Controlled med:  Rx pended and routed for approval/denial

## 2025-07-17 ENCOUNTER — TELEPHONE (OUTPATIENT)
Dept: FAMILY MEDICINE CLINIC | Facility: CLINIC | Age: 55
End: 2025-07-17

## 2025-07-29 ENCOUNTER — TELEMEDICINE (OUTPATIENT)
Dept: FAMILY MEDICINE CLINIC | Facility: CLINIC | Age: 55
End: 2025-07-29
Payer: COMMERCIAL

## 2025-07-29 DIAGNOSIS — E66.9 OBESITY (BMI 30-39.9): ICD-10-CM

## 2025-07-29 DIAGNOSIS — M25.551 RIGHT HIP PAIN: ICD-10-CM

## 2025-07-29 DIAGNOSIS — M47.816 OSTEOARTHRITIS OF LUMBAR SPINE, UNSPECIFIED SPINAL OSTEOARTHRITIS COMPLICATION STATUS: ICD-10-CM

## 2025-07-29 PROCEDURE — 98005 SYNCH AUDIO-VIDEO EST LOW 20: CPT | Performed by: FAMILY MEDICINE

## 2025-07-29 RX ORDER — HYDROCODONE BITARTRATE AND ACETAMINOPHEN 10; 325 MG/1; MG/1
1-2 TABLET ORAL 4 TIMES DAILY PRN
Qty: 120 TABLET | Refills: 0 | Status: SHIPPED | OUTPATIENT
Start: 2025-07-29

## 2025-07-29 RX ORDER — PREDNISONE 1 MG/1
1 TABLET ORAL
Qty: 30 TABLET | Refills: 0 | Status: SHIPPED | OUTPATIENT
Start: 2025-07-29

## 2025-07-29 RX ORDER — TIRZEPATIDE 2.5 MG/.5ML
2.5 INJECTION, SOLUTION SUBCUTANEOUS WEEKLY
Qty: 2 ML | Refills: 0 | Status: SHIPPED | OUTPATIENT
Start: 2025-07-29 | End: 2025-07-31

## 2025-07-31 ENCOUNTER — TELEPHONE (OUTPATIENT)
Dept: FAMILY MEDICINE CLINIC | Facility: CLINIC | Age: 55
End: 2025-07-31

## 2025-07-31 DIAGNOSIS — E66.9 OBESITY (BMI 30-39.9): ICD-10-CM

## 2025-07-31 RX ORDER — TIRZEPATIDE 2.5 MG/.5ML
2.5 INJECTION, SOLUTION SUBCUTANEOUS WEEKLY
Qty: 2 ML | Refills: 0 | Status: SHIPPED | OUTPATIENT
Start: 2025-07-31

## 2025-08-11 ENCOUNTER — OFFICE VISIT (OUTPATIENT)
Dept: FAMILY MEDICINE CLINIC | Facility: CLINIC | Age: 55
End: 2025-08-11

## 2025-08-11 VITALS
SYSTOLIC BLOOD PRESSURE: 128 MMHG | WEIGHT: 248 LBS | DIASTOLIC BLOOD PRESSURE: 78 MMHG | BODY MASS INDEX: 36.73 KG/M2 | OXYGEN SATURATION: 98 % | HEIGHT: 69 IN | RESPIRATION RATE: 16 BRPM | TEMPERATURE: 98 F | HEART RATE: 72 BPM

## 2025-08-11 DIAGNOSIS — M51.379 DEGENERATION OF INTERVERTEBRAL DISC OF LUMBOSACRAL REGION, UNSPECIFIED WHETHER PAIN PRESENT: ICD-10-CM

## 2025-08-11 DIAGNOSIS — E66.9 OBESITY (BMI 30-39.9): ICD-10-CM

## 2025-08-11 DIAGNOSIS — Z12.31 ENCOUNTER FOR SCREENING MAMMOGRAM FOR MALIGNANT NEOPLASM OF BREAST: ICD-10-CM

## 2025-08-11 DIAGNOSIS — I10 PRIMARY HYPERTENSION: Primary | ICD-10-CM

## 2025-08-11 PROCEDURE — 3078F DIAST BP <80 MM HG: CPT | Performed by: FAMILY MEDICINE

## 2025-08-11 PROCEDURE — 3008F BODY MASS INDEX DOCD: CPT | Performed by: FAMILY MEDICINE

## 2025-08-11 PROCEDURE — 3074F SYST BP LT 130 MM HG: CPT | Performed by: FAMILY MEDICINE

## 2025-08-11 PROCEDURE — 99215 OFFICE O/P EST HI 40 MIN: CPT | Performed by: FAMILY MEDICINE

## 2025-08-11 PROCEDURE — G2211 COMPLEX E/M VISIT ADD ON: HCPCS | Performed by: FAMILY MEDICINE

## 2025-08-11 RX ORDER — OXYCODONE AND ACETAMINOPHEN 10; 325 MG/1; MG/1
1 TABLET ORAL EVERY 8 HOURS PRN
Qty: 90 TABLET | Refills: 0 | Status: SHIPPED | OUTPATIENT
Start: 2025-08-11

## 2025-08-11 RX ORDER — PHENTERMINE HYDROCHLORIDE 15 MG/1
15 CAPSULE ORAL EVERY MORNING
Qty: 30 CAPSULE | Refills: 2 | Status: SHIPPED | OUTPATIENT
Start: 2025-08-11

## 2025-08-13 DIAGNOSIS — S83.511A RUPTURE OF ANTERIOR CRUCIATE LIGAMENT OF RIGHT KNEE, INITIAL ENCOUNTER: ICD-10-CM

## 2025-08-13 DIAGNOSIS — M47.816 OSTEOARTHRITIS OF LUMBAR SPINE, UNSPECIFIED SPINAL OSTEOARTHRITIS COMPLICATION STATUS: ICD-10-CM

## 2025-08-13 DIAGNOSIS — G89.29 CHRONIC PAIN OF RIGHT KNEE: ICD-10-CM

## 2025-08-13 DIAGNOSIS — M25.561 CHRONIC PAIN OF RIGHT KNEE: ICD-10-CM

## 2025-08-13 DIAGNOSIS — S83.241A TEAR OF MEDIAL MENISCUS OF RIGHT KNEE, UNSPECIFIED TEAR TYPE, UNSPECIFIED WHETHER OLD OR CURRENT TEAR, INITIAL ENCOUNTER: ICD-10-CM

## 2025-08-13 DIAGNOSIS — G89.4 CHRONIC PAIN SYNDROME: ICD-10-CM

## 2025-08-15 RX ORDER — TRAMADOL HYDROCHLORIDE 50 MG/1
TABLET ORAL
Qty: 240 TABLET | Refills: 0 | Status: SHIPPED | OUTPATIENT
Start: 2025-08-15

## 2025-08-18 ENCOUNTER — TELEPHONE (OUTPATIENT)
Dept: FAMILY MEDICINE CLINIC | Facility: CLINIC | Age: 55
End: 2025-08-18

## 2025-08-19 RX ORDER — TIRZEPATIDE 5 MG/.5ML
5 INJECTION, SOLUTION SUBCUTANEOUS WEEKLY
Qty: 2 ML | Refills: 0 | Status: SHIPPED | OUTPATIENT
Start: 2025-08-19 | End: 2025-09-10

## (undated) DIAGNOSIS — S83.511A RUPTURE OF ANTERIOR CRUCIATE LIGAMENT OF RIGHT KNEE, INITIAL ENCOUNTER: ICD-10-CM

## (undated) DIAGNOSIS — S83.241A TEAR OF MEDIAL MENISCUS OF RIGHT KNEE, UNSPECIFIED TEAR TYPE, UNSPECIFIED WHETHER OLD OR CURRENT TEAR, INITIAL ENCOUNTER: ICD-10-CM

## (undated) DIAGNOSIS — G89.29 CHRONIC PAIN OF RIGHT KNEE: ICD-10-CM

## (undated) DIAGNOSIS — L50.9 HIVES: ICD-10-CM

## (undated) DIAGNOSIS — M25.561 CHRONIC PAIN OF RIGHT KNEE: ICD-10-CM

## (undated) DIAGNOSIS — M47.816 OSTEOARTHRITIS OF LUMBAR SPINE, UNSPECIFIED SPINAL OSTEOARTHRITIS COMPLICATION STATUS: ICD-10-CM

## (undated) DIAGNOSIS — G89.4 CHRONIC PAIN SYNDROME: ICD-10-CM

## (undated) DIAGNOSIS — G89.29 OTHER CHRONIC PAIN: ICD-10-CM

## (undated) DEVICE — COVER,MAYO STAND,STERILE: Brand: MEDLINE

## (undated) DEVICE — SUTURE VICRYL 0 CT-1

## (undated) DEVICE — BULB SYRINGE,IRRIGATION WITH PROTECTIVE CAP: Brand: DOVER

## (undated) DEVICE — KENDALL SCD EXPRESS SLEEVES, KNEE LENGTH, MEDIUM: Brand: KENDALL SCD

## (undated) DEVICE — GYN CDS: Brand: MEDLINE INDUSTRIES, INC.

## (undated) DEVICE — CATH SECURING DEVICE STATLOCK

## (undated) DEVICE — SOL  .9 1000ML BTL

## (undated) DEVICE — MEDI-VAC NON-CONDUCTIVE SUCTION TUBING: Brand: CARDINAL HEALTH

## (undated) DEVICE — RETRACTOR LONE STAR STAYS LG

## (undated) DEVICE — Device

## (undated) DEVICE — DERMABOND LIQUID ADHESIVE

## (undated) DEVICE — STANDARD HYPODERMIC NEEDLE,POLYPROPYLENE HUB: Brand: MONOJECT

## (undated) DEVICE — VIOLET BRAIDED (POLYGLACTIN 910), SYNTHETIC ABSORBABLE SUTURE: Brand: COATED VICRYL

## (undated) DEVICE — #15 STERILE STAINLESS BLADE: Brand: STERILE STAINLESS BLADES

## (undated) DEVICE — CAUTERY PENCIL

## (undated) DEVICE — REM POLYHESIVE ADULT PATIENT RETURN ELECTRODE: Brand: VALLEYLAB

## (undated) DEVICE — SUTURE VICRYL 1 CT-1

## (undated) DEVICE — SUTURE VICRYL 3-0 X-1

## (undated) DEVICE — SPONGE RAYTEC 4X4 RF DETECT

## (undated) DEVICE — FLOSEAL HEMOSTATIC MATRIX, 5ML: Brand: FLOSEAL HEMOSTATIC MATRIX

## (undated) DEVICE — NEEDLE ANCHOR 1824-5D

## (undated) DEVICE — SOL H2O IV

## (undated) DEVICE — LAPAROTOMY SPONGE - RF AND X-RAY DETECTABLE PRE-WASHED: Brand: SITUATE

## (undated) DEVICE — TUBING CYSTO

## (undated) DEVICE — BAG DRAIN INFECTION CNTRL 2000

## (undated) DEVICE — SUTURE VICRYL 2-0 CT-1

## (undated) NOTE — LETTER
Date: 1/9/2020    Patient Name: Declan Haywood          To Whom it may concern: This letter has been written at the patient's request. The above patient was seen at the [facility] for treatment of a medical condition.     This patient sh

## (undated) NOTE — LETTER
[address]  [id number]  [place] [id number]  [facility]: [phone]  FAX: [phone]   Consent to Procedure/Sedation    Date: __2/24/2020_____    Time: ___11:51 AM ___    1.  I authorize the performanc Signature of Patient:  ___________________________    Signature of person authorized to consent for patient: Relationship to patient:  ___________________________    ___________________    Witness: ____________________     Date: ______________    Printed:

## (undated) NOTE — LETTER
Date: 11/10/2021    Patient Name: Chelsy Vázquez          To Whom it may concern: This letter has been written at the patient's request. The above patient was seen at the [facility]. She is currently under my care.  This is to indic

## (undated) NOTE — LETTER
Date: 1/21/2021    Patient Name: Chelsy Vázquez          To Whom it may concern: This letter has been written at the patient's request. The above patient was seen at the [facility] for treatment of a medical condition.     Patient colin

## (undated) NOTE — LETTER
Date: 11/10/2021    Patient Name: Loan Salgado          To Whom it may concern: This letter has been written at the patient's request. The above patient was seen at the [facility].     This is to indicate that she does not have depre